# Patient Record
Sex: FEMALE | Race: WHITE | NOT HISPANIC OR LATINO | URBAN - METROPOLITAN AREA
[De-identification: names, ages, dates, MRNs, and addresses within clinical notes are randomized per-mention and may not be internally consistent; named-entity substitution may affect disease eponyms.]

---

## 2022-01-11 VITALS
TEMPERATURE: 98 F | DIASTOLIC BLOOD PRESSURE: 76 MMHG | HEART RATE: 108 BPM | WEIGHT: 119.93 LBS | RESPIRATION RATE: 24 BRPM | OXYGEN SATURATION: 81 % | SYSTOLIC BLOOD PRESSURE: 136 MMHG

## 2022-01-11 LAB
ALBUMIN SERPL ELPH-MCNC: 2.6 G/DL — LOW (ref 3.4–5)
ALP SERPL-CCNC: 90 U/L — SIGNIFICANT CHANGE UP (ref 40–120)
ALT FLD-CCNC: 29 U/L — SIGNIFICANT CHANGE UP (ref 12–42)
ANION GAP SERPL CALC-SCNC: 10 MMOL/L — SIGNIFICANT CHANGE UP (ref 9–16)
APTT BLD: 28.5 SEC — SIGNIFICANT CHANGE UP (ref 27.5–35.5)
AST SERPL-CCNC: 35 U/L — SIGNIFICANT CHANGE UP (ref 15–37)
BASOPHILS # BLD AUTO: 0.05 K/UL — SIGNIFICANT CHANGE UP (ref 0–0.2)
BASOPHILS NFR BLD AUTO: 0.4 % — SIGNIFICANT CHANGE UP (ref 0–2)
BILIRUB SERPL-MCNC: 0.6 MG/DL — SIGNIFICANT CHANGE UP (ref 0.2–1.2)
BUN SERPL-MCNC: 15 MG/DL — SIGNIFICANT CHANGE UP (ref 7–23)
CALCIUM SERPL-MCNC: 9.1 MG/DL — SIGNIFICANT CHANGE UP (ref 8.5–10.5)
CHLORIDE SERPL-SCNC: 96 MMOL/L — SIGNIFICANT CHANGE UP (ref 96–108)
CO2 SERPL-SCNC: 28 MMOL/L — SIGNIFICANT CHANGE UP (ref 22–31)
CREAT SERPL-MCNC: 0.9 MG/DL — SIGNIFICANT CHANGE UP (ref 0.5–1.3)
D DIMER BLD IA.RAPID-MCNC: 598 NG/ML DDU — HIGH
EOSINOPHIL # BLD AUTO: 0.3 K/UL — SIGNIFICANT CHANGE UP (ref 0–0.5)
EOSINOPHIL NFR BLD AUTO: 2.3 % — SIGNIFICANT CHANGE UP (ref 0–6)
GLUCOSE SERPL-MCNC: 199 MG/DL — HIGH (ref 70–99)
HCT VFR BLD CALC: 41.1 % — SIGNIFICANT CHANGE UP (ref 34.5–45)
HGB BLD-MCNC: 13.4 G/DL — SIGNIFICANT CHANGE UP (ref 11.5–15.5)
IMM GRANULOCYTES NFR BLD AUTO: 1.3 % — SIGNIFICANT CHANGE UP (ref 0–1.5)
INR BLD: 1.19 — HIGH (ref 0.88–1.16)
LYMPHOCYTES # BLD AUTO: 0.92 K/UL — LOW (ref 1–3.3)
LYMPHOCYTES # BLD AUTO: 7.2 % — LOW (ref 13–44)
MCHC RBC-ENTMCNC: 29.8 PG — SIGNIFICANT CHANGE UP (ref 27–34)
MCHC RBC-ENTMCNC: 32.6 GM/DL — SIGNIFICANT CHANGE UP (ref 32–36)
MCV RBC AUTO: 91.5 FL — SIGNIFICANT CHANGE UP (ref 80–100)
MONOCYTES # BLD AUTO: 0.86 K/UL — SIGNIFICANT CHANGE UP (ref 0–0.9)
MONOCYTES NFR BLD AUTO: 6.7 % — SIGNIFICANT CHANGE UP (ref 2–14)
NEUTROPHILS # BLD AUTO: 10.48 K/UL — HIGH (ref 1.8–7.4)
NEUTROPHILS NFR BLD AUTO: 82.1 % — HIGH (ref 43–77)
NRBC # BLD: 0 /100 WBCS — SIGNIFICANT CHANGE UP (ref 0–0)
PLATELET # BLD AUTO: 277 K/UL — SIGNIFICANT CHANGE UP (ref 150–400)
POTASSIUM SERPL-MCNC: 3.6 MMOL/L — SIGNIFICANT CHANGE UP (ref 3.5–5.3)
POTASSIUM SERPL-SCNC: 3.6 MMOL/L — SIGNIFICANT CHANGE UP (ref 3.5–5.3)
PROT SERPL-MCNC: 7.5 G/DL — SIGNIFICANT CHANGE UP (ref 6.4–8.2)
PROTHROM AB SERPL-ACNC: 13.9 SEC — HIGH (ref 10.6–13.6)
RBC # BLD: 4.49 M/UL — SIGNIFICANT CHANGE UP (ref 3.8–5.2)
RBC # FLD: 13 % — SIGNIFICANT CHANGE UP (ref 10.3–14.5)
SARS-COV-2 RNA SPEC QL NAA+PROBE: DETECTED
SODIUM SERPL-SCNC: 134 MMOL/L — SIGNIFICANT CHANGE UP (ref 132–145)
WBC # BLD: 12.77 K/UL — HIGH (ref 3.8–10.5)
WBC # FLD AUTO: 12.77 K/UL — HIGH (ref 3.8–10.5)

## 2022-01-11 PROCEDURE — 71045 X-RAY EXAM CHEST 1 VIEW: CPT | Mod: 26

## 2022-01-11 RX ORDER — LEVOTHYROXINE SODIUM 125 MCG
1 TABLET ORAL
Qty: 0 | Refills: 0 | DISCHARGE

## 2022-01-11 NOTE — ED PROVIDER NOTE - PROGRESS NOTE DETAILS
spoke with dr Johnson medicine, [facility] trial NC and repeat CMP. NC pt able to tolerate 4L O2 by NC with SPo2 97%, sustained while awake and asleep. Improved after NS. pending accepting by [facility] medicine. john bcx and ucx, agreed with no need for abx given COVID PNA

## 2022-01-11 NOTE — ED ADULT NURSE NOTE - HISTORY OF COVID-19 VACCINATION
Addended by: TOM ABDUL on: 9/11/2020 08:46 AM     Modules accepted: Orders     Vaccine status unknown

## 2022-01-11 NOTE — ED ADULT NURSE NOTE - NSIMPLEMENTINTERV_GEN_ALL_ED
Implemented All Fall with Harm Risk Interventions:  [place] to call system. Call bell, personal items and telephone within reach. Instruct patient to call for assistance. Room bathroom lighting operational. Non-slip footwear when patient is off stretcher. Physically safe environment: no spills, clutter or unnecessary equipment. Stretcher in lowest position, wheels locked, appropriate side rails in place. Provide visual cue, wrist band, yellow gown, etc. Monitor gait and stability. Monitor for mental status changes and reorient to person, place, and time. Review medications for side effects contributing to fall risk. Reinforce activity limits and safety measures with patient and family. Provide visual clues: red socks.

## 2022-01-11 NOTE — ED ADULT NURSE NOTE - OBJECTIVE STATEMENT
Patient BIBA for hypoxia on exertion per EMS pt satting at 70-80's on RA. Known COVID pos. Coming from [place]. Tachycardic and tachypneic on exam. Pt upgraded due to acuity. RN and MD at bedside. cardiac moniotring in place. Will continue to monitor.

## 2022-01-11 NOTE — ED PROVIDER NOTE - OBJECTIVE STATEMENT
89 yo female with hx DM hypothyroid and HLD, Nepali speaking only, with hypoxia (+) COVID. Hx obtained from her daughter AMALIA [phone] who was caring for her at home. She along with whole family tested positive 1/2/22 and she has had dec PO intake and progressively low oxygen over the last 2-3 days, they were trying to arrange home oxygen by her PMD DR Sarabia at [facility] but they were unable to provide home services. On arrival to ED hypoxic but otherwise ok appearing, no resp distress.

## 2022-01-11 NOTE — ED PROVIDER NOTE - CLINICAL SUMMARY MEDICAL DECISION MAKING FREE TEXT BOX
COVID 19 viral PNA - supportive care on NC O2, continue   dehydration, improved with IVF in ED.  admit to medicine [facility] Dr Johnson.

## 2022-01-11 NOTE — ED PROVIDER NOTE - PHYSICAL EXAMINATION
VSS in NAD non toxic appearing   NCAT EOMI PERRL OP clear mild dehydrated appearing   heart RRR no murmur   lungs CTA no wheezing no rales no rhonchi   abd soft NT ND no CVAT no guarding no rebound   normal neuro exam CN I-XII grossly intact, no groos motor or sensory deficits  no peripheral c/c/e

## 2022-01-11 NOTE — ED ADULT NURSE REASSESSMENT NOTE - NS ED NURSE REASSESS COMMENT FT1
Pt. received awake and alert semi fowlers in stretcher tachypneic at 26bpm. Pt. on NRB at 15lpm SPo2 99%. 22g to R. hand no redness, swelling, or tenderness noted. Labs redrawn and sent. Pt. on CCM. Pending lab results. Monitoring ongoing.

## 2022-01-12 ENCOUNTER — INPATIENT (INPATIENT)
Facility: HOSPITAL | Age: 87
LOS: 8 days | Discharge: NURSING, REHAB OR LONG-TERM CARE | DRG: 871 | End: 2022-01-21
Attending: STUDENT IN AN ORGANIZED HEALTH CARE EDUCATION/TRAINING PROGRAM | Admitting: HOSPITALIST
Payer: MEDICARE

## 2022-01-12 ENCOUNTER — TRANSCRIPTION ENCOUNTER (OUTPATIENT)
Age: 87
End: 2022-01-12

## 2022-01-12 DIAGNOSIS — U07.1 COVID-19: ICD-10-CM

## 2022-01-12 DIAGNOSIS — E03.9 HYPOTHYROIDISM, UNSPECIFIED: ICD-10-CM

## 2022-01-12 DIAGNOSIS — E78.5 HYPERLIPIDEMIA, UNSPECIFIED: ICD-10-CM

## 2022-01-12 DIAGNOSIS — E11.9 TYPE 2 DIABETES MELLITUS WITHOUT COMPLICATIONS: ICD-10-CM

## 2022-01-12 DIAGNOSIS — A41.9 SEPSIS, UNSPECIFIED ORGANISM: ICD-10-CM

## 2022-01-12 DIAGNOSIS — R63.8 OTHER SYMPTOMS AND SIGNS CONCERNING FOOD AND FLUID INTAKE: ICD-10-CM

## 2022-01-12 DIAGNOSIS — R94.31 ABNORMAL ELECTROCARDIOGRAM [ECG] [EKG]: ICD-10-CM

## 2022-01-12 LAB
A1C WITH ESTIMATED AVERAGE GLUCOSE RESULT: 8.2 % — HIGH (ref 4–5.6)
ALBUMIN SERPL ELPH-MCNC: 3.6 G/DL — SIGNIFICANT CHANGE UP (ref 3.3–5)
ALP SERPL-CCNC: 102 U/L — SIGNIFICANT CHANGE UP (ref 40–120)
ALT FLD-CCNC: 20 U/L — SIGNIFICANT CHANGE UP (ref 10–45)
ANION GAP SERPL CALC-SCNC: 17 MMOL/L — SIGNIFICANT CHANGE UP (ref 5–17)
APPEARANCE UR: CLEAR — SIGNIFICANT CHANGE UP
AST SERPL-CCNC: 26 U/L — SIGNIFICANT CHANGE UP (ref 10–40)
BACTERIA # UR AUTO: PRESENT /HPF
BASOPHILS # BLD AUTO: 0.04 K/UL — SIGNIFICANT CHANGE UP (ref 0–0.2)
BASOPHILS NFR BLD AUTO: 0.4 % — SIGNIFICANT CHANGE UP (ref 0–2)
BILIRUB SERPL-MCNC: 0.6 MG/DL — SIGNIFICANT CHANGE UP (ref 0.2–1.2)
BILIRUB UR-MCNC: NEGATIVE — SIGNIFICANT CHANGE UP
BUN SERPL-MCNC: 14 MG/DL — SIGNIFICANT CHANGE UP (ref 7–23)
CALCIUM SERPL-MCNC: 9.1 MG/DL — SIGNIFICANT CHANGE UP (ref 8.4–10.5)
CHLORIDE SERPL-SCNC: 96 MMOL/L — SIGNIFICANT CHANGE UP (ref 96–108)
CO2 SERPL-SCNC: 26 MMOL/L — SIGNIFICANT CHANGE UP (ref 22–31)
COLOR SPEC: YELLOW — SIGNIFICANT CHANGE UP
CREAT SERPL-MCNC: 0.76 MG/DL — SIGNIFICANT CHANGE UP (ref 0.5–1.3)
CRP SERPL-MCNC: 231.8 MG/L — HIGH (ref 0–4)
DIFF PNL FLD: NEGATIVE — SIGNIFICANT CHANGE UP
EOSINOPHIL # BLD AUTO: 0 K/UL — SIGNIFICANT CHANGE UP (ref 0–0.5)
EOSINOPHIL NFR BLD AUTO: 0 % — SIGNIFICANT CHANGE UP (ref 0–6)
EPI CELLS # UR: SIGNIFICANT CHANGE UP /HPF (ref 0–5)
ESTIMATED AVERAGE GLUCOSE: 189 MG/DL — HIGH (ref 68–114)
FERRITIN SERPL-MCNC: 657 NG/ML — HIGH (ref 15–150)
FERRITIN SERPL-MCNC: 693 NG/ML — HIGH (ref 15–150)
FOLATE SERPL-MCNC: 9.6 NG/ML — SIGNIFICANT CHANGE UP
GLUCOSE BLDC GLUCOMTR-MCNC: 241 MG/DL — HIGH (ref 70–99)
GLUCOSE BLDC GLUCOMTR-MCNC: 265 MG/DL — HIGH (ref 70–99)
GLUCOSE BLDC GLUCOMTR-MCNC: 265 MG/DL — HIGH (ref 70–99)
GLUCOSE BLDC GLUCOMTR-MCNC: 296 MG/DL — HIGH (ref 70–99)
GLUCOSE SERPL-MCNC: 264 MG/DL — HIGH (ref 70–99)
GLUCOSE UR QL: NEGATIVE — SIGNIFICANT CHANGE UP
HCT VFR BLD CALC: 38.2 % — SIGNIFICANT CHANGE UP (ref 34.5–45)
HGB BLD-MCNC: 11.9 G/DL — SIGNIFICANT CHANGE UP (ref 11.5–15.5)
HYALINE CASTS # UR AUTO: ABNORMAL /LPF (ref 0–2)
IMM GRANULOCYTES NFR BLD AUTO: 2 % — HIGH (ref 0–1.5)
KETONES UR-MCNC: 40 MG/DL
LEUKOCYTE ESTERASE UR-ACNC: NEGATIVE — SIGNIFICANT CHANGE UP
LYMPHOCYTES # BLD AUTO: 0.59 K/UL — LOW (ref 1–3.3)
LYMPHOCYTES # BLD AUTO: 5.9 % — LOW (ref 13–44)
MAGNESIUM SERPL-MCNC: 2.2 MG/DL — SIGNIFICANT CHANGE UP (ref 1.6–2.6)
MCHC RBC-ENTMCNC: 28.8 PG — SIGNIFICANT CHANGE UP (ref 27–34)
MCHC RBC-ENTMCNC: 31.2 GM/DL — LOW (ref 32–36)
MCV RBC AUTO: 92.5 FL — SIGNIFICANT CHANGE UP (ref 80–100)
MONOCYTES # BLD AUTO: 0.19 K/UL — SIGNIFICANT CHANGE UP (ref 0–0.9)
MONOCYTES NFR BLD AUTO: 1.9 % — LOW (ref 2–14)
NEUTROPHILS # BLD AUTO: 9.04 K/UL — HIGH (ref 1.8–7.4)
NEUTROPHILS NFR BLD AUTO: 89.8 % — HIGH (ref 43–77)
NITRITE UR-MCNC: POSITIVE
NRBC # BLD: 0 /100 WBCS — SIGNIFICANT CHANGE UP (ref 0–0)
PH UR: 6 — SIGNIFICANT CHANGE UP (ref 5–8)
PHOSPHATE SERPL-MCNC: 3.7 MG/DL — SIGNIFICANT CHANGE UP (ref 2.5–4.5)
PLATELET # BLD AUTO: 319 K/UL — SIGNIFICANT CHANGE UP (ref 150–400)
POTASSIUM SERPL-MCNC: 3.7 MMOL/L — SIGNIFICANT CHANGE UP (ref 3.5–5.3)
POTASSIUM SERPL-SCNC: 3.7 MMOL/L — SIGNIFICANT CHANGE UP (ref 3.5–5.3)
PROCALCITONIN SERPL-MCNC: 0.1 NG/ML — SIGNIFICANT CHANGE UP (ref 0.02–0.1)
PROT SERPL-MCNC: 7.1 G/DL — SIGNIFICANT CHANGE UP (ref 6–8.3)
PROT UR-MCNC: 30 MG/DL
RBC # BLD: 4.13 M/UL — SIGNIFICANT CHANGE UP (ref 3.8–5.2)
RBC # FLD: 12.7 % — SIGNIFICANT CHANGE UP (ref 10.3–14.5)
RBC CASTS # UR COMP ASSIST: < 5 /HPF — SIGNIFICANT CHANGE UP
SODIUM SERPL-SCNC: 139 MMOL/L — SIGNIFICANT CHANGE UP (ref 135–145)
SP GR SPEC: 1.02 — SIGNIFICANT CHANGE UP (ref 1–1.03)
TRANSFERRIN SERPL-MCNC: 125 MG/DL — LOW (ref 200–360)
TSH SERPL-MCNC: 1.1 UIU/ML — SIGNIFICANT CHANGE UP (ref 0.27–4.2)
UROBILINOGEN FLD QL: 1 E.U./DL — SIGNIFICANT CHANGE UP
VIT B12 SERPL-MCNC: 825 PG/ML — SIGNIFICANT CHANGE UP (ref 232–1245)
WBC # BLD: 10.06 K/UL — SIGNIFICANT CHANGE UP (ref 3.8–10.5)
WBC # FLD AUTO: 10.06 K/UL — SIGNIFICANT CHANGE UP (ref 3.8–10.5)
WBC UR QL: < 5 /HPF — SIGNIFICANT CHANGE UP

## 2022-01-12 PROCEDURE — 93010 ELECTROCARDIOGRAM REPORT: CPT

## 2022-01-12 PROCEDURE — 99232 SBSQ HOSP IP/OBS MODERATE 35: CPT

## 2022-01-12 PROCEDURE — 99285 EMERGENCY DEPT VISIT HI MDM: CPT | Mod: CS,25

## 2022-01-12 PROCEDURE — 71045 X-RAY EXAM CHEST 1 VIEW: CPT | Mod: 26

## 2022-01-12 RX ORDER — ACETAMINOPHEN 500 MG
650 TABLET ORAL EVERY 6 HOURS
Refills: 0 | Status: DISCONTINUED | OUTPATIENT
Start: 2022-01-12 | End: 2022-01-21

## 2022-01-12 RX ORDER — SODIUM CHLORIDE 9 MG/ML
1000 INJECTION, SOLUTION INTRAVENOUS
Refills: 0 | Status: DISCONTINUED | OUTPATIENT
Start: 2022-01-12 | End: 2022-01-21

## 2022-01-12 RX ORDER — PANTOPRAZOLE SODIUM 20 MG/1
40 TABLET, DELAYED RELEASE ORAL
Refills: 0 | Status: COMPLETED | OUTPATIENT
Start: 2022-01-12 | End: 2022-01-21

## 2022-01-12 RX ORDER — DEXTROSE 50 % IN WATER 50 %
25 SYRINGE (ML) INTRAVENOUS ONCE
Refills: 0 | Status: DISCONTINUED | OUTPATIENT
Start: 2022-01-12 | End: 2022-01-21

## 2022-01-12 RX ORDER — INSULIN LISPRO 100/ML
VIAL (ML) SUBCUTANEOUS
Refills: 0 | Status: DISCONTINUED | OUTPATIENT
Start: 2022-01-12 | End: 2022-01-17

## 2022-01-12 RX ORDER — REMDESIVIR 5 MG/ML
100 INJECTION INTRAVENOUS EVERY 24 HOURS
Refills: 0 | Status: COMPLETED | OUTPATIENT
Start: 2022-01-13 | End: 2022-01-16

## 2022-01-12 RX ORDER — LANOLIN ALCOHOL/MO/W.PET/CERES
3 CREAM (GRAM) TOPICAL AT BEDTIME
Refills: 0 | Status: DISCONTINUED | OUTPATIENT
Start: 2022-01-12 | End: 2022-01-21

## 2022-01-12 RX ORDER — CYCLOSPORINE 0.5 MG/ML
1 EMULSION OPHTHALMIC
Qty: 0 | Refills: 0 | DISCHARGE

## 2022-01-12 RX ORDER — DEXAMETHASONE 0.5 MG/5ML
6 ELIXIR ORAL EVERY 24 HOURS
Refills: 0 | Status: COMPLETED | OUTPATIENT
Start: 2022-01-13 | End: 2022-01-21

## 2022-01-12 RX ORDER — REMDESIVIR 5 MG/ML
200 INJECTION INTRAVENOUS EVERY 24 HOURS
Refills: 0 | Status: COMPLETED | OUTPATIENT
Start: 2022-01-12 | End: 2022-01-12

## 2022-01-12 RX ORDER — REPAGLINIDE 1 MG/1
1 TABLET ORAL
Qty: 0 | Refills: 0 | DISCHARGE

## 2022-01-12 RX ORDER — GLUCAGON INJECTION, SOLUTION 0.5 MG/.1ML
1 INJECTION, SOLUTION SUBCUTANEOUS ONCE
Refills: 0 | Status: DISCONTINUED | OUTPATIENT
Start: 2022-01-12 | End: 2022-01-21

## 2022-01-12 RX ORDER — LEVOTHYROXINE SODIUM 125 MCG
75 TABLET ORAL DAILY
Refills: 0 | Status: DISCONTINUED | OUTPATIENT
Start: 2022-01-12 | End: 2022-01-21

## 2022-01-12 RX ORDER — POTASSIUM CHLORIDE 20 MEQ
20 PACKET (EA) ORAL ONCE
Refills: 0 | Status: COMPLETED | OUTPATIENT
Start: 2022-01-12 | End: 2022-01-20

## 2022-01-12 RX ORDER — REMDESIVIR 5 MG/ML
INJECTION INTRAVENOUS
Refills: 0 | Status: COMPLETED | OUTPATIENT
Start: 2022-01-12 | End: 2022-01-16

## 2022-01-12 RX ORDER — ENOXAPARIN SODIUM 100 MG/ML
40 INJECTION SUBCUTANEOUS EVERY 24 HOURS
Refills: 0 | Status: DISCONTINUED | OUTPATIENT
Start: 2022-01-12 | End: 2022-01-21

## 2022-01-12 RX ORDER — DEXTROSE 50 % IN WATER 50 %
15 SYRINGE (ML) INTRAVENOUS ONCE
Refills: 0 | Status: DISCONTINUED | OUTPATIENT
Start: 2022-01-12 | End: 2022-01-21

## 2022-01-12 RX ORDER — DEXTROSE 50 % IN WATER 50 %
12.5 SYRINGE (ML) INTRAVENOUS ONCE
Refills: 0 | Status: DISCONTINUED | OUTPATIENT
Start: 2022-01-12 | End: 2022-01-21

## 2022-01-12 RX ORDER — DEXAMETHASONE 0.5 MG/5ML
6 ELIXIR ORAL ONCE
Refills: 0 | Status: COMPLETED | OUTPATIENT
Start: 2022-01-12 | End: 2022-01-12

## 2022-01-12 RX ADMIN — ENOXAPARIN SODIUM 40 MILLIGRAM(S): 100 INJECTION SUBCUTANEOUS at 06:24

## 2022-01-12 RX ADMIN — Medication 6 MILLIGRAM(S): at 01:08

## 2022-01-12 RX ADMIN — Medication 6: at 12:16

## 2022-01-12 RX ADMIN — Medication 4: at 22:21

## 2022-01-12 RX ADMIN — Medication 1 DROP(S): at 21:42

## 2022-01-12 RX ADMIN — Medication 75 MICROGRAM(S): at 06:24

## 2022-01-12 RX ADMIN — Medication 3 MILLIGRAM(S): at 21:42

## 2022-01-12 RX ADMIN — Medication 1 DROP(S): at 12:15

## 2022-01-12 RX ADMIN — Medication 1 TABLET(S): at 12:15

## 2022-01-12 RX ADMIN — Medication 6: at 08:45

## 2022-01-12 RX ADMIN — Medication 6: at 17:35

## 2022-01-12 RX ADMIN — REMDESIVIR 200 MILLIGRAM(S): 5 INJECTION INTRAVENOUS at 06:24

## 2022-01-12 RX ADMIN — PANTOPRAZOLE SODIUM 40 MILLIGRAM(S): 20 TABLET, DELAYED RELEASE ORAL at 06:24

## 2022-01-12 NOTE — DISCHARGE NOTE PROVIDER - HOSPITAL COURSE
#Discharge: do not delete    Patient is 89 yo F with PMH DM, hypothyroidism, HLD, and s/p Pfizer x2 who p/w ERICKSON, decreased PO intake, and worsening hypoxia x3d (to low 80s%), admitted for further management of COVID+ 1/2/22.    Hospital course (by problem):   #2019 novel coronavirus disease (COVID-19).   ·  Plan: - as above  - d-dimer 598; ferritin and CRP pending  - f/u procal -- defer abx at present  - continue to trend inflammatory markers  - start remdesivir x5d (end 1/16)  - c/w decadron 6mg x10d (end 1/20)  - wean O2 as able.    #Acute sepsis. - RESOLVED  ·  Plan: - pt p/w 3d dec PO intake, ERICKSON, and hypoxia in the setting of known COVID+ 1/2/22, was trying to obtain home O2 via PCP but unsuccessful; initial O2sat low 90s--80s--70s on day of presentation  - on arrival, meeting 3/4 SIRS criteria (tachycardia, tachypnea, and leukocytosis) with likely etiology pulmonary in setting of COVID-19  - CXR with BL infiltrates L>R  - f/u UA  - f/u procal  - defer abx at present.    # Prolonged QT interval.   ·  Plan: - EKG on arrival ST without ischemia, QTc 535  - f/u AM EKG  - avoid QTc prolonging agents.    #Diabetes.   ·  Plan: - pt with hx T2D, on repaglinide 2mg outpt  - check A1C   - carb consistent diet  - mISS while inpt.    #Hypothyroidism.   ·  Plan: - pt with hx hypothyroidism, on synthroid 75 mcg outpt  - c/w home med  - check TSH.    #Hyperlipidemia.   ·  Plan: - pt with hx HLD, not on meds.    Patient was discharged to: HOME (home/[facility]/acute rehab/hospice, etc, and with what services – home health PT/RN? Home O2?)    New medications:   Changes to old medications: NONE  Medications that were stopped: NONE    Items to follow up as outpatient: PRIMARY CARE DOCTOR    Physical exam at the time of discharge:  Constitutional: NAD, comfortable in bed.  HEENT: NC/AT, PERRLA, EOMI, no conjunctival pallor or scleral icterus, mucus membranes moist.  Neck: Supple, no JVD. My thyromegaly or masses.  Respiratory: Clear to auscultation B/L. No wheezing, rales, rhonchi.  Cardiovascular: RRR, normal S1 and S2, no murmurs, rubs, gallops.  Gastrointestinal: +BS, soft NTND, no guarding or rebound tenderness, no palpable masses.  Extremities: Warm well perfused. No cyanosis, clubbing or edema.   Vascular: +2 pulses equal and strong throughout.   Neurological: AAOx3, no CN deficits, strength and sensation intact throughout.   Skin: No gross skin abnormalities or rashes. #Discharge: do not delete    Patient is 89 yo F with PMH DM, hypothyroidism, HLD, and s/p Pfizer x2 who p/w ERICKSON, decreased PO intake, and worsening hypoxia x3d (to low 80s%), admitted for further management of COVID+ 1/2/22.    Hospital course (by problem):   #2019 novel coronavirus disease (COVID-19)  - Completed remdesivir x5d (end 1/16)  - decadron 6mg x10d (end 1/20)  - wean O2 as able.    #Acute sepsis. - RESOLVED  - pt p/w 3d dec PO intake, ERICKSON, and hypoxia in the setting of known COVID+ 1/2/22, was trying to obtain home O2 via PCP but unsuccessful; initial O2sat low 90s--80s--70s on day of presentation  - on arrival, meeting 3/4 SIRS criteria (tachycardia, tachypnea, and leukocytosis) with likely etiology pulmonary in setting of COVID-19    #Diabetes.   - pt with hx T2D, on repaglinide 2mg outpt    #Hypothyroidism  - pt with hx hypothyroidism, on synthroid 75 mcg outpt    #Hyperlipidemia.   - not on meds.    Patient was discharged to: [facility]    New medications: NONE  Changes to old medications: NONE  Medications that were stopped: NONE    Items to follow up as outpatient: PRIMARY CARE DOCTOR    Physical exam at the time of discharge:  Constitutional: NAD, comfortable in bed.  HEENT: NC/AT, PERRLA, EOMI, no conjunctival pallor or scleral icterus, mucus membranes moist.  Neck: Supple, no JVD. My thyromegaly or masses.  Respiratory: Clear to auscultation B/L. No wheezing, rales, rhonchi.  Cardiovascular: RRR, normal S1 and S2, no murmurs, rubs, gallops.  Gastrointestinal: +BS, soft NTND, no guarding or rebound tenderness, no palpable masses.  Extremities: Warm well perfused. No cyanosis, clubbing or edema.   Vascular: +2 pulses equal and strong throughout.   Neurological: AAOx3, no CN deficits, strength and sensation intact throughout.   Skin: No gross skin abnormalities or rashes. #Discharge: do not delete    Patient is 89 yo F with PMH DM, hypothyroidism, HLD, and s/p Pfizer x2 who p/w ERICKSON, decreased PO intake, and worsening hypoxia x3d (to low 80s%), admitted for further management of COVID+ 1/2/22.    Hospital course (by problem):   #2019 novel coronavirus disease (COVID-19)  - Completed remdesivir x5d (end 1/16)  - completed decadron 6mg x10d (end 1/20)  - weaned O2 to 4L NC. Pt will be discharged on 4L NC to rehab as patient is still requiring O2 but is starting to improve    #Acute sepsis. - RESOLVED  - pt p/w 3d dec PO intake, ERICKSON, and hypoxia in the setting of known COVID+ 1/2/22, was trying to obtain home O2 via PCP but unsuccessful; initial O2sat low 90s--80s--70s on day of presentation  - on arrival, meeting 3/4 SIRS criteria (tachycardia, tachypnea, and leukocytosis) with likely etiology pulmonary in setting of COVID-19    #Diabetes.   - pt with hx T2D, on repaglinide 2mg outpt    #Hypothyroidism  - pt with hx hypothyroidism, on synthroid 75 mcg outpt    #Hyperlipidemia.   - not on meds.    Patient was discharged to: [facility]    New medications: NONE  Changes to old medications: NONE  Medications that were stopped: NONE    Items to follow up as outpatient: PRIMARY CARE DOCTOR regarding a1c and diabetes medications    Physical exam at the time of discharge:  Constitutional: NAD, comfortable in bed.  HEENT: NC/AT, PERRLA, EOMI, no conjunctival pallor or scleral icterus, mucus membranes moist.  Neck: Supple, no JVD. My thyromegaly or masses.  Respiratory: Clear to auscultation B/L. No wheezing, rales, rhonchi. on 4L  Cardiovascular: RRR, normal S1 and S2, no murmurs, rubs, gallops.  Gastrointestinal: +BS, soft NTND, no guarding or rebound tenderness, no palpable masses.  Extremities: Warm well perfused. No cyanosis, clubbing or edema.   Vascular: +2 pulses equal and strong throughout.   Neurological: AAOx3, no CN deficits, strength and sensation intact throughout.   Skin: No gross skin abnormalities or rashes. #Discharge: do not delete    Patient is 89 yo F with PMH DM, hypothyroidism, HLD, and s/p Pfizer x2 who p/w ERICKSON, decreased PO intake, and worsening hypoxia x3d (to low 80s%), admitted for further management of COVID+ 1/2/22.    Hospital course (by problem):   #2019 novel coronavirus disease (COVID-19)  - Completed remdesivir x5d (end 1/16)  - completed decadron 6mg x10d (end 1/20)  - weaned O2 to 4L NC. Pt will be discharged on 4L NC to rehab as patient is still requiring O2 but is starting to improve    #Acute sepsis. - RESOLVED  - pt p/w 3d dec PO intake, ERICKSON, and hypoxia in the setting of known COVID+ 1/2/22, was trying to obtain home O2 via PCP but unsuccessful; initial O2sat low 90s--80s--70s on day of presentation  - on arrival, meeting 3/4 SIRS criteria (tachycardia, tachypnea, and leukocytosis) with likely etiology pulmonary in setting of COVID-19    #Diabetes.   - pt with hx T2D, on repaglinide 2mg outpt  - was on mISS and basal bolus 13U lantus and 6U premeal lispro while on steroids  - sugars should improve now that she is off steroids  -a1c 8.1. should evaluate for further medications    #Hypothyroidism  - pt with hx hypothyroidism, on synthroid 75 mcg outpt    #Hyperlipidemia.   - not on meds.    Patient was discharged to: [facility]    New medications: NONE  Changes to old medications: NONE  Medications that were stopped: NONE    Items to follow up as outpatient: PRIMARY CARE DOCTOR regarding a1c and diabetes medications    Physical exam at the time of discharge:  Constitutional: NAD, comfortable in bed.  HEENT: NC/AT, PERRLA, EOMI, no conjunctival pallor or scleral icterus, mucus membranes moist.  Neck: Supple, no JVD. My thyromegaly or masses.  Respiratory: Clear to auscultation B/L. No wheezing, rales, rhonchi. on 4L  Cardiovascular: RRR, normal S1 and S2, no murmurs, rubs, gallops.  Gastrointestinal: +BS, soft NTND, no guarding or rebound tenderness, no palpable masses.  Extremities: Warm well perfused. No cyanosis, clubbing or edema.   Vascular: +2 pulses equal and strong throughout.   Neurological: AAOx3, no CN deficits, strength and sensation intact throughout.   Skin: No gross skin abnormalities or rashes.

## 2022-01-12 NOTE — H&P ADULT - PROBLEM SELECTOR PLAN 1
- pt p/w 3d dec PO intake, ERICKSON, and hypoxia in the setting of known COVID+ 1/2/22, was trying to obtain home O2 via PCP but unsuccessful; initial O2sat low 90s--80s--70s on day of presentation  - on arrival, meeting 3/4 SIRS criteria (tachycardia, tachypnea, and leukocytosis) with likely etiology pulmonary in setting of COVID-19  - CXR with BL infiltrates L>R  - f/u UA  - f/u procal  - defer abx at present

## 2022-01-12 NOTE — DISCHARGE NOTE PROVIDER - NSDCCPCAREPLAN_GEN_ALL_CORE_FT
PRINCIPAL DISCHARGE DIAGNOSIS  Diagnosis: COVID-19  Assessment and Plan of Treatment: You were admitted to the hospital and found to have a COVID infection. You were treated with steroids called DECADRON and a medication called REMDESIVIR while you were inpatient. If you experience worsening symptoms including shortness of breath, chest pain, dizziness, fevers, or chills, please consult your primary care doctor or return to the emergency department immediately.         PRINCIPAL DISCHARGE DIAGNOSIS  Diagnosis: COVID-19  Assessment and Plan of Treatment: You were admitted to the hospital and found to have a COVID infection. You were treated with steroids called DECADRON and a medication called REMDESIVIR while you were inpatient. Your oxygen requirements have improved since you were treated for covid. You should continue on 4L of oxygen at rehab until your lungs improve further. If you experience worsening symptoms including shortness of breath, chest pain, dizziness, fevers, or chills, please consult your primary care doctor or return to the emergency department immediately.

## 2022-01-12 NOTE — DISCHARGE NOTE PROVIDER - NSDCMRMEDTOKEN_GEN_ALL_CORE_FT
levothyroxine 75 mcg (0.075 mg) oral tablet: 1 tab(s) orally once a day  repaglinide 2 mg oral tablet: 1 tab(s) orally 3 times a day (before meals)  Restasis 0.05% ophthalmic emulsion: 1 drop(s) to each affected eye every 12 hours

## 2022-01-12 NOTE — H&P ADULT - NSHPLABSRESULTS_GEN_ALL_CORE
LABS:                        13.4   12.77 )-----------( 277      ( 11 Jan 2022 20:26 )             41.1     01-11    134  |  96  |  15  ----------------------------<  199<H>  3.6   |  28  |  0.90    Ca    9.1      11 Jan 2022 22:10    TPro  7.5  /  Alb  2.6<L>  /  TBili  0.6  /  DBili  x   /  AST  35  /  ALT  29  /  AlkPhos  90  01-11    PT/INR - ( 11 Jan 2022 20:26 )   PT: 13.9 sec;   INR: 1.19          PTT - ( 11 Jan 2022 20:26 )  PTT:28.5 sec    CAPILLARY BLOOD GLUCOSE      POCT Blood Glucose.: 171 mg/dL (11 Jan 2022 19:52)      RADIOLOGY & ADDITIONAL TESTS: Reviewed.

## 2022-01-12 NOTE — H&P ADULT - PROBLEM SELECTOR PLAN 7
- F: none  - E: replete K<4, Mg<2  - N: carb consistent  - D: lovenox 40mg q24h  - G: protonix 40mg/d while on steroids    code: full  dispo: COVID RMF

## 2022-01-12 NOTE — H&P ADULT - ASSESSMENT
Pt is a 89 yo F with PMH DM, hypothyroidism, HLD, and s/p Pfizer x2 who p/w ERICKSON, decreased PO intake, and hypoxia x3d. Found to be COVID+ with BL infiltrates on CXR. Admitted to [facility] for further observation and management.

## 2022-01-12 NOTE — H&P ADULT - PROBLEM SELECTOR PLAN 2
- as above  - d-dimer 598; ferritin and CRP pending  - f/u procal -- defer abx at present  - - as above  - d-dimer 598; ferritin and CRP pending  - f/u procal -- defer abx at present  - continue to trend inflammatory markers  - start remdesivir x5d (end 1/16)  - c/w decadron 6mg x10d (end 1/20)  - wean O2 as able

## 2022-01-12 NOTE — H&P ADULT - NSHPPHYSICALEXAM_GEN_ALL_CORE
VITAL SIGNS:  T(C): 36.8 (01-12-22 @ 01:27), Max: 37.3 (01-11-22 @ 21:22)  T(F): 98.2 (01-12-22 @ 01:27), Max: 99.2 (01-11-22 @ 21:22)  HR: 90 (01-12-22 @ 01:27) (89 - 109)  BP: 148/74 (01-12-22 @ 01:27) (136/63 - 148/74)  BP(mean): --  RR: 18 (01-12-22 @ 01:27) (18 - 24)  SpO2: 98% (01-12-22 @ 01:27) (81% - 99%)  Wt(kg): --    PHYSICAL EXAM:  Constitutional: WDWN resting comfortably in bed; NAD  Head: NC/AT  Eyes: PERRL, EOMI, anicteric sclera  ENT: no nasal discharge; MMM  Neck: supple; no JVD  Respiratory: CTA B/L; no W/R/R  Cardiac: +S1/S2; RRR; no M/R/G  Gastrointestinal: soft, NT/ND; no rebound or guarding; +BSx4  Extremities: WWP, no clubbing or cyanosis; no peripheral edema  Musculoskeletal: NROM x4; no joint swelling, tenderness or erythema  Vascular: 2+ radial, DP/PT pulses B/L  Dermatologic: skin warm, dry and intact; no rashes, wounds, or scars  Neurologic: AAOx3; CNII-XII grossly intact; no focal deficits  Psychiatric: affect and characteristics of appearance, verbalizations, behaviors are appropriate VITAL SIGNS:  T(C): 36.8 (01-12-22 @ 01:27), Max: 37.3 (01-11-22 @ 21:22)  T(F): 98.2 (01-12-22 @ 01:27), Max: 99.2 (01-11-22 @ 21:22)  HR: 90 (01-12-22 @ 01:27) (89 - 109)  BP: 148/74 (01-12-22 @ 01:27) (136/63 - 148/74)  BP(mean): --  RR: 18 (01-12-22 @ 01:27) (18 - 24)  SpO2: 98% (01-12-22 @ 01:27) (81% - 99%)  Wt(kg): --    PHYSICAL EXAM:  Constitutional: WDWN resting comfortably in bed; NAD; very hard of hearing  Head: NC/AT  Eyes: PERRL, EOMI, anicteric sclera  ENT: no nasal discharge; MMM  Neck: supple; no JVD  Respiratory: CTA B/L; no W/R/R; on 2L NC without signs of respiratory distress, speaking in full sentences  Cardiac: +S1/S2; RRR; no M/R/G  Gastrointestinal: soft, NT/ND; no rebound or guarding; +BSx4  Extremities: WWP, no clubbing or cyanosis; no peripheral edema  Musculoskeletal: NROM x4; no joint swelling, tenderness or erythema  Vascular: 2+ radial, DP/PT pulses B/L  Dermatologic: skin warm, dry and intact; no rashes, wounds, or scars  Neurologic: AAOx3; CNII-XII grossly intact; no focal deficits  Psychiatric: affect and characteristics of appearance, verbalizations, behaviors are appropriate

## 2022-01-12 NOTE — H&P ADULT - HISTORY OF PRESENT ILLNESS
Pt is a 89 yo F with PMH Pt is a 89 yo F with PMH DM, hypothyroidism, HLD, and s/p Pfizer x2 who p/w ERICKSON, decreased PO intake, and hypoxia x3d. Tested positive for COVID 1/2/22 and initial pulse ox readings were in the mid 90s. Over the course of the last 2d pt went to mid 80s and then 70s on day of presentation. Had been working with PCP Dr. Sarabia to get home O2 but they were unable to obtain. Of note, pt returned from a trip to [place] 12/19/21. Lives with daughter's family who initially tested negative when she was positive, and then later they too tested positive. Pt with mild dry cough. Denies HA, changes to vision, CP, abd pain, changes to BMs or urination, pain in extremities. At baseline is fully independent. No other concerns or complaints.     On arrival, T 98.1, , /76, RR 24 O2sat 81% RA -- 99% NRB -- 97% 4L NC. EKG with ST no ischemic changes, QTc 535. Labs with WBC 12.77, 82.1% neutrophils, K 3.6, glu 199, d-dimer 598, albumin 2.6, ferritin and procal pending. COVID+. CXR with BL infiltrates L>R. Pt given decadron 5mg. Admitted to [facility] for further observation and management. Pt is a 89 yo F with PMH DM, hypothyroidism, HLD, and s/p Pfizer x2 who p/w ERICKSON, decreased PO intake, and hypoxia x3d. Tested positive for COVID 1/2/22 and initial pulse ox readings were in the mid 90s. Over the course of the last 2d pt went to mid 80s and then 70s on day of presentation. Had been working with PCP Dr. Sarabia to get home O2 but they were unable to obtain. Of note, pt returned from a trip to [place] 12/19/21. Lives with daughter's family who initially tested negative when she was positive, and then later they too tested positive. Pt with mild dry cough. Denies HA, changes to vision, CP, abd pain, changes to BMs or urination, pain in extremities. At baseline is fully independent. No other concerns or complaints.     On arrival, T 98.1, , /76, RR 24 O2sat 81% RA -- 99% NRB -- 97% 4L NC. EKG with ST no ischemic changes, QTc 535. Labs with WBC 12.77, 82.1% neutrophils, K 3.6, glu 199, d-dimer 598, albumin 2.6, ferritin and procal pending. COVID+. CXR with BL infiltrates L>R. Pt given decadron 6mg. Admitted to [facility] for further observation and management.

## 2022-01-12 NOTE — DISCHARGE NOTE PROVIDER - CARE PROVIDER_API CALL
YOSSI BAKER  Internal Medicine  [address] [place] [address], [address] [address]  Phone: [phone]  Fax: ()-  Established Patient  Follow Up Time: 2 weeks

## 2022-01-13 LAB
ALBUMIN SERPL ELPH-MCNC: 3.1 G/DL — LOW (ref 3.3–5)
ALP SERPL-CCNC: 144 U/L — HIGH (ref 40–120)
ALT FLD-CCNC: 24 U/L — SIGNIFICANT CHANGE UP (ref 10–45)
ANION GAP SERPL CALC-SCNC: 14 MMOL/L — SIGNIFICANT CHANGE UP (ref 5–17)
AST SERPL-CCNC: 29 U/L — SIGNIFICANT CHANGE UP (ref 10–40)
BASOPHILS # BLD AUTO: 0 K/UL — SIGNIFICANT CHANGE UP (ref 0–0.2)
BASOPHILS NFR BLD AUTO: 0 % — SIGNIFICANT CHANGE UP (ref 0–2)
BILIRUB SERPL-MCNC: 0.4 MG/DL — SIGNIFICANT CHANGE UP (ref 0.2–1.2)
BUN SERPL-MCNC: 18 MG/DL — SIGNIFICANT CHANGE UP (ref 7–23)
BURR CELLS BLD QL SMEAR: PRESENT — SIGNIFICANT CHANGE UP
CALCIUM SERPL-MCNC: 9.5 MG/DL — SIGNIFICANT CHANGE UP (ref 8.4–10.5)
CHLORIDE SERPL-SCNC: 98 MMOL/L — SIGNIFICANT CHANGE UP (ref 96–108)
CO2 SERPL-SCNC: 26 MMOL/L — SIGNIFICANT CHANGE UP (ref 22–31)
CREAT SERPL-MCNC: 0.82 MG/DL — SIGNIFICANT CHANGE UP (ref 0.5–1.3)
DACRYOCYTES BLD QL SMEAR: SLIGHT — SIGNIFICANT CHANGE UP
EOSINOPHIL # BLD AUTO: 0 K/UL — SIGNIFICANT CHANGE UP (ref 0–0.5)
EOSINOPHIL NFR BLD AUTO: 0 % — SIGNIFICANT CHANGE UP (ref 0–6)
GIANT PLATELETS BLD QL SMEAR: PRESENT — SIGNIFICANT CHANGE UP
GLUCOSE BLDC GLUCOMTR-MCNC: 199 MG/DL — HIGH (ref 70–99)
GLUCOSE BLDC GLUCOMTR-MCNC: 280 MG/DL — HIGH (ref 70–99)
GLUCOSE BLDC GLUCOMTR-MCNC: 318 MG/DL — HIGH (ref 70–99)
GLUCOSE BLDC GLUCOMTR-MCNC: 323 MG/DL — HIGH (ref 70–99)
GLUCOSE SERPL-MCNC: 191 MG/DL — HIGH (ref 70–99)
HCT VFR BLD CALC: 40.7 % — SIGNIFICANT CHANGE UP (ref 34.5–45)
HGB BLD-MCNC: 12.4 G/DL — SIGNIFICANT CHANGE UP (ref 11.5–15.5)
LYMPHOCYTES # BLD AUTO: 0.87 K/UL — LOW (ref 1–3.3)
LYMPHOCYTES # BLD AUTO: 5.3 % — LOW (ref 13–44)
MACROCYTES BLD QL: SLIGHT — SIGNIFICANT CHANGE UP
MAGNESIUM SERPL-MCNC: 2.3 MG/DL — SIGNIFICANT CHANGE UP (ref 1.6–2.6)
MANUAL SMEAR VERIFICATION: SIGNIFICANT CHANGE UP
MCHC RBC-ENTMCNC: 28.7 PG — SIGNIFICANT CHANGE UP (ref 27–34)
MCHC RBC-ENTMCNC: 30.5 GM/DL — LOW (ref 32–36)
MCV RBC AUTO: 94.2 FL — SIGNIFICANT CHANGE UP (ref 80–100)
MICROCYTES BLD QL: SLIGHT — SIGNIFICANT CHANGE UP
MONOCYTES # BLD AUTO: 0.43 K/UL — SIGNIFICANT CHANGE UP (ref 0–0.9)
MONOCYTES NFR BLD AUTO: 2.6 % — SIGNIFICANT CHANGE UP (ref 2–14)
MYELOCYTES NFR BLD: 0.9 % — HIGH (ref 0–0)
NEUTROPHILS # BLD AUTO: 14.95 K/UL — HIGH (ref 1.8–7.4)
NEUTROPHILS NFR BLD AUTO: 91.2 % — HIGH (ref 43–77)
OVALOCYTES BLD QL SMEAR: SLIGHT — SIGNIFICANT CHANGE UP
PHOSPHATE SERPL-MCNC: 3.5 MG/DL — SIGNIFICANT CHANGE UP (ref 2.5–4.5)
PLAT MORPH BLD: ABNORMAL
PLATELET # BLD AUTO: 292 K/UL — SIGNIFICANT CHANGE UP (ref 150–400)
POIKILOCYTOSIS BLD QL AUTO: SLIGHT — SIGNIFICANT CHANGE UP
POLYCHROMASIA BLD QL SMEAR: SLIGHT — SIGNIFICANT CHANGE UP
POTASSIUM SERPL-MCNC: 4 MMOL/L — SIGNIFICANT CHANGE UP (ref 3.5–5.3)
POTASSIUM SERPL-SCNC: 4 MMOL/L — SIGNIFICANT CHANGE UP (ref 3.5–5.3)
PROT SERPL-MCNC: 7.2 G/DL — SIGNIFICANT CHANGE UP (ref 6–8.3)
RBC # BLD: 4.32 M/UL — SIGNIFICANT CHANGE UP (ref 3.8–5.2)
RBC # FLD: 12.6 % — SIGNIFICANT CHANGE UP (ref 10.3–14.5)
RBC BLD AUTO: ABNORMAL
SODIUM SERPL-SCNC: 138 MMOL/L — SIGNIFICANT CHANGE UP (ref 135–145)
SPHEROCYTES BLD QL SMEAR: SLIGHT — SIGNIFICANT CHANGE UP
WBC # BLD: 16.39 K/UL — HIGH (ref 3.8–10.5)
WBC # FLD AUTO: 16.39 K/UL — HIGH (ref 3.8–10.5)

## 2022-01-13 PROCEDURE — 71045 X-RAY EXAM CHEST 1 VIEW: CPT | Mod: 26

## 2022-01-13 PROCEDURE — 99233 SBSQ HOSP IP/OBS HIGH 50: CPT

## 2022-01-13 RX ORDER — INSULIN LISPRO 100/ML
3 VIAL (ML) SUBCUTANEOUS
Refills: 0 | Status: DISCONTINUED | OUTPATIENT
Start: 2022-01-13 | End: 2022-01-14

## 2022-01-13 RX ORDER — INSULIN GLARGINE 100 [IU]/ML
10 INJECTION, SOLUTION SUBCUTANEOUS AT BEDTIME
Refills: 0 | Status: DISCONTINUED | OUTPATIENT
Start: 2022-01-13 | End: 2022-01-14

## 2022-01-13 RX ADMIN — Medication 6 MILLIGRAM(S): at 06:40

## 2022-01-13 RX ADMIN — Medication 6: at 12:41

## 2022-01-13 RX ADMIN — Medication 1 DROP(S): at 18:05

## 2022-01-13 RX ADMIN — ENOXAPARIN SODIUM 40 MILLIGRAM(S): 100 INJECTION SUBCUTANEOUS at 06:40

## 2022-01-13 RX ADMIN — Medication 1 TABLET(S): at 13:20

## 2022-01-13 RX ADMIN — Medication 8: at 18:03

## 2022-01-13 RX ADMIN — Medication 75 MICROGRAM(S): at 06:42

## 2022-01-13 RX ADMIN — Medication 3 UNIT(S): at 18:04

## 2022-01-13 RX ADMIN — Medication 1 DROP(S): at 06:40

## 2022-01-13 RX ADMIN — Medication 2: at 09:45

## 2022-01-13 RX ADMIN — PANTOPRAZOLE SODIUM 40 MILLIGRAM(S): 20 TABLET, DELAYED RELEASE ORAL at 06:40

## 2022-01-13 RX ADMIN — Medication 8: at 22:11

## 2022-01-13 RX ADMIN — REMDESIVIR 500 MILLIGRAM(S): 5 INJECTION INTRAVENOUS at 06:41

## 2022-01-13 RX ADMIN — Medication 3 MILLIGRAM(S): at 22:49

## 2022-01-13 RX ADMIN — INSULIN GLARGINE 10 UNIT(S): 100 INJECTION, SOLUTION SUBCUTANEOUS at 22:10

## 2022-01-13 NOTE — DIETITIAN INITIAL EVALUATION ADULT. - OTHER INFO
Pt is a 89 yo F with PMH DM, hypothyroidism, HLD, and s/p Pfizer x2 who p/w ERICKSON, decreased PO intake, and hypoxia x3d. Found to be COVID+ with BL infiltrates on CXR. Admitted to [facility] for further observation and management.  Today per discussion with daughter on phone, mother has been eating a lot less and generally needs soft/creamy foods due to poor dentition(left dentures at home).Per daughter UBW:72kg, Present recorded weight :inaccurate. Bed scale weights:68kg,IBW:53.6.BMI:26.2.127% of IBW .No N/V/D/C or BM. SKin intact. Eating <60%.Needs food changed to soft consistency.

## 2022-01-13 NOTE — DIETITIAN INITIAL EVALUATION ADULT. - ORAL INTAKE PTA/DIET HISTORY
Per daughter(on phone) mother was eating soft creamy foods ie: mashed potatoes ,yogurt, jello .Avoids concentrated sweets and juice due to DM .Poor po since mid-december.No allergies or Jain avoidances

## 2022-01-13 NOTE — PHYSICAL THERAPY INITIAL EVALUATION ADULT - PERTINENT HX OF CURRENT PROBLEM, REHAB EVAL
89 yo F with PMH DM, hypothyroidism, HLD, and s/p Pfizer x2 who p/w ERICKSON, decreased PO intake, and hypoxia x3d. Tested positive for COVID 1/2/22 and initial pulse ox readings were in the mid 90s. Over the course of the last 2d pt went to mid 80s and then 70s on day of presentation.

## 2022-01-13 NOTE — PHYSICAL THERAPY INITIAL EVALUATION ADULT - ADDITIONAL COMMENTS
Pt currently resides w/ daughter and family in elevator apartment. Primarily amb w/ SC, has RW at home as well. Denies falling within past 6 months. Daughter and family assists w/ bathing and dressing 2/2 difficulty w/ RUE fine motor movements after undergoing hand surgery. Family also assists w/ other needs, as needed.

## 2022-01-13 NOTE — DIETITIAN INITIAL EVALUATION ADULT. - PROBLEM SELECTOR PLAN 2
- as above  - d-dimer 598; ferritin and CRP pending  - f/u procal -- defer abx at present  - continue to trend inflammatory markers  - start remdesivir x5d (end 1/16)  - c/w decadron 6mg x10d (end 1/20)  - wean O2 as able

## 2022-01-13 NOTE — PROGRESS NOTE ADULT - SUBJECTIVE AND OBJECTIVE BOX
CC: Patient is a 89y old  Female who presents with a chief complaint of COVID PNA (13 Jan 2022 08:32)      INTERVAL EVENTS: MARJAN    SUBJECTIVE / INTERVAL HPI: Patient seen and examined at bedside. Pt says that cough is about the same. Denies SOB, diarrhea, abdominal pain or fevers.    ROS: negative unless otherwise stated above.    VITAL SIGNS:  Vital Signs Last 24 Hrs  T(C): 36.6 (13 Jan 2022 06:32), Max: 36.6 (12 Jan 2022 21:03)  T(F): 97.8 (13 Jan 2022 06:32), Max: 97.9 (12 Jan 2022 21:03)  HR: 66 (13 Jan 2022 06:32) (66 - 70)  BP: 131/74 (13 Jan 2022 06:32) (126/68 - 131/74)  BP(mean): --  RR: 20 (13 Jan 2022 06:32) (17 - 20)  SpO2: 90% (13 Jan 2022 06:32) (86% - 92%)      01-12-22 @ 07:01  -  01-13-22 @ 07:00  --------------------------------------------------------  IN: 0 mL / OUT: 400 mL / NET: -400 mL        PHYSICAL EXAM:    General: NAD  HEENT: MMM  Neck: supple  Cardiovascular: rhonchi at bases  Respiratory: CTA B/L; no W/R/R  Gastrointestinal: soft, NT/ND  Extremities: WWP; no edema, clubbing or cyanosis  Vascular: 2+ radial, DP/PT pulses B/L  Neurological: AAOx3; no focal deficits    MEDICATIONS:  MEDICATIONS  (STANDING):  artificial tears (preservative free) Ophthalmic Solution 1 Drop(s) Both EYES two times a day  dexAMETHasone     Tablet 6 milliGRAM(s) Oral every 24 hours  dextrose 40% Gel 15 Gram(s) Oral once  dextrose 5%. 1000 milliLiter(s) (50 mL/Hr) IV Continuous <Continuous>  dextrose 5%. 1000 milliLiter(s) (100 mL/Hr) IV Continuous <Continuous>  dextrose 50% Injectable 25 Gram(s) IV Push once  dextrose 50% Injectable 12.5 Gram(s) IV Push once  dextrose 50% Injectable 25 Gram(s) IV Push once  enoxaparin Injectable 40 milliGRAM(s) SubCutaneous every 24 hours  glucagon  Injectable 1 milliGRAM(s) IntraMuscular once  insulin lispro (ADMELOG) corrective regimen sliding scale   SubCutaneous Before meals and at bedtime  levothyroxine 75 MICROGram(s) Oral daily  melatonin 3 milliGRAM(s) Oral at bedtime  multivitamin  Chewable 1 Tablet(s) Oral daily  pantoprazole    Tablet 40 milliGRAM(s) Oral before breakfast  potassium chloride    Tablet ER 20 milliEquivalent(s) Oral once  remdesivir  IVPB   IV Intermittent   remdesivir  IVPB 100 milliGRAM(s) IV Intermittent every 24 hours    MEDICATIONS  (PRN):  acetaminophen     Tablet .. 650 milliGRAM(s) Oral every 6 hours PRN Temp greater or equal to 38C (100.4F), Mild Pain (1 - 3)  guaiFENesin Oral Liquid (Sugar-Free) 100 milliGRAM(s) Oral every 6 hours PRN Cough      ALLERGIES:  Allergies    No Known Allergies    Intolerances        LABS:                        12.4   16.39 )-----------( 292      ( 13 Jan 2022 07:19 )             40.7     01-13    138  |  98  |  18  ----------------------------<  191<H>  4.0   |  26  |  0.82    Ca    9.5      13 Jan 2022 07:19  Phos  3.5     01-13  Mg     2.3     01-13    TPro  7.2  /  Alb  3.1<L>  /  TBili  0.4  /  DBili  x   /  AST  29  /  ALT  24  /  AlkPhos  144<H>  01-13    PT/INR - ( 11 Jan 2022 20:26 )   PT: 13.9 sec;   INR: 1.19          PTT - ( 11 Jan 2022 20:26 )  PTT:28.5 sec  Urinalysis Basic - ( 12 Jan 2022 12:47 )    Color: x / Appearance: x / SG: x / pH: x  Gluc: x / Ketone: x  / Bili: x / Urobili: x   Blood: x / Protein: x / Nitrite: x   Leuk Esterase: x / RBC: < 5 /HPF / WBC < 5 /HPF   Sq Epi: x / Non Sq Epi: 0-5 /HPF / Bacteria: Present /HPF      CAPILLARY BLOOD GLUCOSE      POCT Blood Glucose.: 280 mg/dL (13 Jan 2022 11:58)      RADIOLOGY & ADDITIONAL TESTS: Reviewed.

## 2022-01-13 NOTE — PHYSICAL THERAPY INITIAL EVALUATION ADULT - DISCHARGE DISPOSITION, PT EVAL
Post op Day [1 ]    Patient resting without complaints.  No chest pain, SOB, N/V.    T(C): 36.6 (10-21-19 @ 05:36), Max: 37.1 (10-20-19 @ 16:30)  HR: 63 (10-21-19 @ 05:36) (60 - 81)  BP: 127/68 (10-21-19 @ 05:36) (125/62 - 216/85)  RR: 18 (10-21-19 @ 05:36) (14 - 21)  SpO2: 95% (10-21-19 @ 05:36) (93% - 100%)  Wt(kg): --    Exam:  Alert and Oriented, No Acute Distress  LUE elevated in splint, c/d/i  digits pink, warm, mobile, SILT, brisk cap refill <2 seconds, appropriate mild edema to dorsal aspect of foot  Calves Soft, Non-tender bilaterally                          11.9   17.35 )-----------( 299      ( 20 Oct 2019 12:26 )             34.5    10-20    133<L>  |  99  |  29<H>  ----------------------------<  201<H>  4.1   |  22  |  1.34<H>    Ca    9.3      20 Oct 2019 12:26    TPro  6.9  /  Alb  4.0  /  TBili  0.7  /  DBili  x   /  AST  18  /  ALT  17  /  AlkPhos  57  10-20 HPT w/ family assist

## 2022-01-13 NOTE — CONSULT NOTE ADULT - SUBJECTIVE AND OBJECTIVE BOX
Patient is a 89y old  Female who presents with a chief complaint of COVID PNA (12 Jan 2022 18:08)       HPI:  Pt is a 89 yo F with PMH DM, hypothyroidism, HLD, and s/p Pfizer x2 who p/w ERICKSON, decreased PO intake, and hypoxia x3d. Tested positive for COVID 1/2/22 and initial pulse ox readings were in the mid 90s. Over the course of the last 2d pt went to mid 80s and then 70s on day of presentation. Had been working with PCP Dr. Sarabia to get home O2 but they were unable to obtain. Of note, pt returned from a trip to [place] 12/19/21. Lives with daughter's family who initially tested negative when she was positive, and then later they too tested positive. Pt with mild dry cough. Denies HA, changes to vision, CP, abd pain, changes to BMs or urination, pain in extremities. At baseline is fully independent. No other concerns or complaints.     On arrival, T 98.1, , /76, RR 24 O2sat 81% RA -- 99% NRB -- 97% 4L NC. EKG with ST no ischemic changes, QTc 535. Labs with WBC 12.77, 82.1% neutrophils, K 3.6, glu 199, d-dimer 598, albumin 2.6, ferritin and procal pending. COVID+. CXR with BL infiltrates L>R. Pt given decadron 6mg. Admitted to [facility] for further observation and management. (12 Jan 2022 02:29)      PAST MEDICAL & SURGICAL HISTORY:  DM (diabetes mellitus)    HLD (hyperlipidemia)    Hypothyroid    No significant past surgical history        MEDICATIONS  (STANDING):  artificial tears (preservative free) Ophthalmic Solution 1 Drop(s) Both EYES two times a day  dexAMETHasone     Tablet 6 milliGRAM(s) Oral every 24 hours  dextrose 40% Gel 15 Gram(s) Oral once  dextrose 5%. 1000 milliLiter(s) (50 mL/Hr) IV Continuous <Continuous>  dextrose 5%. 1000 milliLiter(s) (100 mL/Hr) IV Continuous <Continuous>  dextrose 50% Injectable 25 Gram(s) IV Push once  dextrose 50% Injectable 12.5 Gram(s) IV Push once  dextrose 50% Injectable 25 Gram(s) IV Push once  enoxaparin Injectable 40 milliGRAM(s) SubCutaneous every 24 hours  glucagon  Injectable 1 milliGRAM(s) IntraMuscular once  insulin lispro (ADMELOG) corrective regimen sliding scale   SubCutaneous Before meals and at bedtime  levothyroxine 75 MICROGram(s) Oral daily  melatonin 3 milliGRAM(s) Oral at bedtime  multivitamin  Chewable 1 Tablet(s) Oral daily  pantoprazole    Tablet 40 milliGRAM(s) Oral before breakfast  potassium chloride    Tablet ER 20 milliEquivalent(s) Oral once  remdesivir  IVPB   IV Intermittent   remdesivir  IVPB 100 milliGRAM(s) IV Intermittent every 24 hours    MEDICATIONS  (PRN):  acetaminophen     Tablet .. 650 milliGRAM(s) Oral every 6 hours PRN Temp greater or equal to 38C (100.4F), Mild Pain (1 - 3)  guaiFENesin Oral Liquid (Sugar-Free) 100 milliGRAM(s) Oral every 6 hours PRN Cough        FAMILY HISTORY:  No pertinent family history in first degree relatives        CBC Full  -  ( 13 Jan 2022 07:19 )  WBC Count : 16.39 K/uL  RBC Count : 4.32 M/uL  Hemoglobin : 12.4 g/dL  Hematocrit : 40.7 %  Platelet Count - Automated : 292 K/uL  Mean Cell Volume : 94.2 fl  Mean Cell Hemoglobin : 28.7 pg  Mean Cell Hemoglobin Concentration : 30.5 gm/dL  Auto Neutrophil # : x  Auto Lymphocyte # : x  Auto Monocyte # : x  Auto Eosinophil # : x  Auto Basophil # : x  Auto Neutrophil % : x  Auto Lymphocyte % : x  Auto Monocyte % : x  Auto Eosinophil % : x  Auto Basophil % : x      01-13    138  |  98  |  18  ----------------------------<  191<H>  4.0   |  26  |  0.82    Ca    9.5      13 Jan 2022 07:19  Phos  3.5     01-13  Mg     2.3     01-13    TPro  7.2  /  Alb  3.1<L>  /  TBili  0.4  /  DBili  x   /  AST  29  /  ALT  24  /  AlkPhos  144<H>  01-13      Urinalysis Basic - ( 12 Jan 2022 12:47 )    Color: x / Appearance: x / SG: x / pH: x  Gluc: x / Ketone: x  / Bili: x / Urobili: x   Blood: x / Protein: x / Nitrite: x   Leuk Esterase: x / RBC: < 5 /HPF / WBC < 5 /HPF   Sq Epi: x / Non Sq Epi: 0-5 /HPF / Bacteria: Present /HPF          Radiology:    < from: Xray Chest 1 View AP/PA (01.11.22 @ 21:24) >  EXAM:  XR CHEST AP OR PA 1V                           PROCEDURE DATE:  01/11/2022          INTERPRETATION:  TECHNIQUE: Single portable view of the chest.    COMPARISON:  None    CLINICAL HISTORY: Shortness of Breath, Cough,  Fever    FINDINGS:    Single frontal view of the chest demonstrates bilateral lower lobe   infiltrates. The cardiomediastinal silhouette is enlarged. No acute   osseous abnormalities. Overlying EKG leads and wires are noted    IMPRESSION: Bilateral lower lobe infiltrates.                    Vital Signs Last 24 Hrs  T(C): 36.6 (13 Jan 2022 06:32), Max: 36.6 (12 Jan 2022 08:45)  T(F): 97.8 (13 Jan 2022 06:32), Max: 97.9 (12 Jan 2022 21:03)  HR: 66 (13 Jan 2022 06:32) (66 - 74)  BP: 131/74 (13 Jan 2022 06:32) (112/73 - 131/74)  BP(mean): --  RR: 20 (13 Jan 2022 06:32) (16 - 20)  SpO2: 90% (13 Jan 2022 06:32) (86% - 95%)        REVIEW OF SYSTEMS:    CONSTITUTIONAL: No fever, weight loss, or fatigue  EYES: No eye pain, visual disturbances, or discharge  ENMT:  No difficulty hearing, tinnitus, vertigo; No sinus or throat pain  NECK: No pain or stiffness  BREASTS: No pain, masses, or nipple discharge  RESPIRATORY:   per HPI  CARDIOVASCULAR: No chest pain, palpitations, dizziness, or leg swelling  GASTROINTESTINAL: No abdominal or epigastric pain. No nausea, vomiting, or hematemesis; No diarrhea or constipation. No melena or hematochezia.  GENITOURINARY: No dysuria, frequency, hematuria, or incontinence  NEUROLOGICAL: No headaches, memory loss, loss of strength, numbness, or tremors  SKIN: No itching, burning, rashes, or lesions   LYMPH NODES: No enlarged glands  ENDOCRINE: No heat or cold intolerance; No hair loss  MUSCULOSKELETAL: No joint pain or swelling; No muscle, back, or extremity pain  PSYCHIATRIC: No depression, anxiety, mood swings, or difficulty sleeping  HEME/LYMPH: No easy bruising, or bleeding gums  ALLERGY AND IMMUNOLOGIC: No hives or eczema  VASCULAR: no swelling, erythema,           Physical Exam:  on COVID isolation, in accordance with current standards limiting patient contact, please refer to exam performed on 1/12/2022    Constitutional: WDWN resting comfortably in bed; NAD; very hard of hearing  Head: NC/AT  Eyes: PERRL, EOMI, anicteric sclera  ENT: no nasal discharge; MMM  Neck: supple; no JVD  Respiratory: CTA B/L; no W/R/R; on 2L NC without signs of respiratory distress, speaking in full sentences  Cardiac: +S1/S2; RRR; no M/R/G  Gastrointestinal: soft, NT/ND; no rebound or guarding; +BSx4  Extremities: WWP, no clubbing or cyanosis; no peripheral edema  Musculoskeletal: NROM x4; no joint swelling, tenderness or erythema  Vascular: 2+ radial, DP/PT pulses B/L  Dermatologic: skin warm, dry and intact; no rashes, wounds, or scars  Neurologic: AAOx3; CNII-XII grossly intact; no focal deficits  Psychiatric: affect and characteristics of appearance, verbalizations, behaviors are appropriate          PM&R Impression:    1) deconditioned  2) no focal weakness    Recommendations/ Plan :    1) Physical therapy focusing on therapeutic exercises, bed mobility/transfer out of bed evaluation, progressive ambulation with assistive devices prn.    2) Anticipated Disposition Plan/Recs:    pending functional progress

## 2022-01-13 NOTE — PROGRESS NOTE ADULT - PROBLEM SELECTOR PLAN 1
- pt p/w 3d dec PO intake, ERICKSON, and hypoxia in the setting of known COVID+ 1/2/22, was trying to obtain home O2 via PCP but unsuccessful; initial O2sat low 90s--80s--70s on day of presentation.  - on arrival, meeting 3/4 SIRS criteria (tachycardia, tachypnea, and leukocytosis) with likely etiology pulmonary in setting of COVID-19  - CXR with BL infiltrates L>R  - UA negative  - procal normal

## 2022-01-13 NOTE — PROGRESS NOTE ADULT - PROBLEM SELECTOR PLAN 4
- pt with hx T2D, on repaglinide 2mg outpt  - A1c 8.2  - carb consistent diet  - mISS while inpt - pt with hx T2D, on repaglinide 2mg outpt  - A1c 8.2  - carb consistent diet  - starting basal bolus - 10U lantus at bedtime and 3U lispro premeal as pt is on steroids for covid right now and sugars have been running high

## 2022-01-13 NOTE — CONSULT NOTE ADULT - ASSESSMENT
per Internal Medicine     89 yo F with PMH DM, hypothyroidism, HLD, and s/p Pfizer x2 who p/w ERICKSON, decreased PO intake, and hypoxia x3d. Found to be COVID+ with BL infiltrates on CXR. Admitted to [facility] for further observation and management.     Problem/Plan - 1   ·  Problem: Acute sepsis.   ·  Plan: - pt p/w 3d dec PO intake, ERICKSON, and hypoxia in the setting of known COVID+ 1/2/22, was trying to obtain home O2 via PCP but unsuccessful; initial O2sat low 90s--80s--70s on day of presentation  - on arrival, meeting 3/4 SIRS criteria (tachycardia, tachypnea, and leukocytosis) with likely etiology pulmonary in setting of COVID-19  - CXR with BL infiltrates L>R  - f/u UA  - f/u procal  - defer abx at present.    Problem/Plan - 2   ·  Problem: 2019 novel coronavirus disease (COVID-19).   ·  Plan: - as above  - d-dimer 598; ferritin and CRP pending  - f/u procal -- defer abx at present  - continue to trend inflammatory markers  - start remdesivir x5d (end 1/16)  - c/w decadron 6mg x10d (end 1/20)  - wean O2 as able.    Problem/Plan - 3   ·  Problem: Prolonged QT interval.   ·  Plan: - EKG on arrival ST without ischemia, QTc 535  - f/u AM EKG  - avoid QTc prolonging agents.    Problem/Plan - 4   ·  Problem: Diabetes.   ·  Plan: - pt with hx T2D, on repaglinide 2mg outpt  - check A1C   - carb consistent diet  - mISS while inpt.    Problem/Plan - 5   ·  Problem: Hypothyroidism.   ·  Plan: - pt with hx hypothyroidism, on synthroid 75 mcg outpt  - c/w home med  - check TSH.    Problem/Plan - 6   ·  Problem: Hyperlipidemia.   ·  Plan: - pt with hx HLD, not on meds.    Problem/Plan - 7   ·  Problem: Nutrition, metabolism, and development symptoms.   ·  Plan: - F: none  - E: replete K<4, Mg<2  - N: carb consistent  - D: lovenox 40mg q24h  - G: protonix 40mg/d while on steroids    code: full  dispo: COVID RMF.

## 2022-01-13 NOTE — PHYSICAL THERAPY INITIAL EVALUATION ADULT - IMPAIRMENTS FOUND, PT EVAL
aerobic capacity/endurance/gait, locomotion, and balance/gross motor/muscle strength/posture/ROM/ventilation and respiration/gas exchange

## 2022-01-13 NOTE — PROGRESS NOTE ADULT - PROBLEM SELECTOR PLAN 5
- pt with hx hypothyroidism, on synthroid 75 mcg outpt  - c/w home med  - check TSH - pt with hx hypothyroidism, on synthroid 75 mcg outpt  - c/w home med  - TSH normal

## 2022-01-13 NOTE — PHYSICAL THERAPY INITIAL EVALUATION ADULT - MANUAL MUSCLE TESTING RESULTS, REHAB EVAL
Grossly 3+/5 throughout BUE in shoulder flex/ext and elbow flex/ext movements. Grossly good bilateral  strength. At least 3/5 BLE based on antigravity mobility.

## 2022-01-13 NOTE — CONSULT NOTE ADULT - CONSULT REASON
Patient Seen in: [facility] [place]      History   Patient presents with:  Urinary Symptoms    Stated Complaint: Urinary retention    HPI    60-year-old female here with complaint of sensation of fullness to lower abdomen and initi by Joaquín Banda DO at [facility] ENDOSCOPY   • HERNIA SURGERY     • HYSTERECTOMY     • LUMPECTOMY RIGHT     • OTHER      right and left heart cath   • OTHER Right     leg vein surgery   • PORT, INDWELLING, IMP      removed s/p chemo   • SINUS SURGERY Rehab evaluation lower leg: No edema. Left lower leg: No edema. Skin:     General: Skin is warm and dry. Capillary Refill: Capillary refill takes less than 2 seconds. Neurological:      General: No focal deficit present.       Mental Status: She is alert and o encounter diagnosis)  Prolapsed uterus    Disposition:  Discharge  6/7/2020  9:04 am    Follow-up:  Ana Short, [address] [place] [address]  [phone]    In 1 day  As needed    Tory Ortiz DO  [id number]  [facility], [place], operated by [facility]

## 2022-01-14 LAB
ALBUMIN SERPL ELPH-MCNC: 2.9 G/DL — LOW (ref 3.3–5)
ALP SERPL-CCNC: 80 U/L — SIGNIFICANT CHANGE UP (ref 40–120)
ALT FLD-CCNC: 20 U/L — SIGNIFICANT CHANGE UP (ref 10–45)
ANION GAP SERPL CALC-SCNC: 14 MMOL/L — SIGNIFICANT CHANGE UP (ref 5–17)
AST SERPL-CCNC: 22 U/L — SIGNIFICANT CHANGE UP (ref 10–40)
BASOPHILS # BLD AUTO: 0.07 K/UL — SIGNIFICANT CHANGE UP (ref 0–0.2)
BASOPHILS NFR BLD AUTO: 0.5 % — SIGNIFICANT CHANGE UP (ref 0–2)
BILIRUB SERPL-MCNC: 0.3 MG/DL — SIGNIFICANT CHANGE UP (ref 0.2–1.2)
BUN SERPL-MCNC: 26 MG/DL — HIGH (ref 7–23)
CALCIUM SERPL-MCNC: 9.1 MG/DL — SIGNIFICANT CHANGE UP (ref 8.4–10.5)
CHLORIDE SERPL-SCNC: 100 MMOL/L — SIGNIFICANT CHANGE UP (ref 96–108)
CO2 SERPL-SCNC: 26 MMOL/L — SIGNIFICANT CHANGE UP (ref 22–31)
CREAT SERPL-MCNC: 0.82 MG/DL — SIGNIFICANT CHANGE UP (ref 0.5–1.3)
EOSINOPHIL # BLD AUTO: 0 K/UL — SIGNIFICANT CHANGE UP (ref 0–0.5)
EOSINOPHIL NFR BLD AUTO: 0 % — SIGNIFICANT CHANGE UP (ref 0–6)
GLUCOSE BLDC GLUCOMTR-MCNC: 163 MG/DL — HIGH (ref 70–99)
GLUCOSE BLDC GLUCOMTR-MCNC: 209 MG/DL — HIGH (ref 70–99)
GLUCOSE BLDC GLUCOMTR-MCNC: 279 MG/DL — HIGH (ref 70–99)
GLUCOSE BLDC GLUCOMTR-MCNC: 285 MG/DL — HIGH (ref 70–99)
GLUCOSE SERPL-MCNC: 225 MG/DL — HIGH (ref 70–99)
HCT VFR BLD CALC: 38.9 % — SIGNIFICANT CHANGE UP (ref 34.5–45)
HGB BLD-MCNC: 12 G/DL — SIGNIFICANT CHANGE UP (ref 11.5–15.5)
IMM GRANULOCYTES NFR BLD AUTO: 2.1 % — HIGH (ref 0–1.5)
LYMPHOCYTES # BLD AUTO: 1.1 K/UL — SIGNIFICANT CHANGE UP (ref 1–3.3)
LYMPHOCYTES # BLD AUTO: 8 % — LOW (ref 13–44)
MAGNESIUM SERPL-MCNC: 2.2 MG/DL — SIGNIFICANT CHANGE UP (ref 1.6–2.6)
MCHC RBC-ENTMCNC: 28.8 PG — SIGNIFICANT CHANGE UP (ref 27–34)
MCHC RBC-ENTMCNC: 30.8 GM/DL — LOW (ref 32–36)
MCV RBC AUTO: 93.3 FL — SIGNIFICANT CHANGE UP (ref 80–100)
MONOCYTES # BLD AUTO: 0.78 K/UL — SIGNIFICANT CHANGE UP (ref 0–0.9)
MONOCYTES NFR BLD AUTO: 5.6 % — SIGNIFICANT CHANGE UP (ref 2–14)
NEUTROPHILS # BLD AUTO: 11.57 K/UL — HIGH (ref 1.8–7.4)
NEUTROPHILS NFR BLD AUTO: 83.8 % — HIGH (ref 43–77)
NRBC # BLD: 0 /100 WBCS — SIGNIFICANT CHANGE UP (ref 0–0)
PHOSPHATE SERPL-MCNC: 2.6 MG/DL — SIGNIFICANT CHANGE UP (ref 2.5–4.5)
PLATELET # BLD AUTO: 365 K/UL — SIGNIFICANT CHANGE UP (ref 150–400)
POTASSIUM SERPL-MCNC: 3.8 MMOL/L — SIGNIFICANT CHANGE UP (ref 3.5–5.3)
POTASSIUM SERPL-SCNC: 3.8 MMOL/L — SIGNIFICANT CHANGE UP (ref 3.5–5.3)
PROT SERPL-MCNC: 6.5 G/DL — SIGNIFICANT CHANGE UP (ref 6–8.3)
RBC # BLD: 4.17 M/UL — SIGNIFICANT CHANGE UP (ref 3.8–5.2)
RBC # FLD: 12.8 % — SIGNIFICANT CHANGE UP (ref 10.3–14.5)
SODIUM SERPL-SCNC: 140 MMOL/L — SIGNIFICANT CHANGE UP (ref 135–145)
WBC # BLD: 13.81 K/UL — HIGH (ref 3.8–10.5)
WBC # FLD AUTO: 13.81 K/UL — HIGH (ref 3.8–10.5)

## 2022-01-14 PROCEDURE — 99223 1ST HOSP IP/OBS HIGH 75: CPT | Mod: AI

## 2022-01-14 RX ORDER — POTASSIUM CHLORIDE 20 MEQ
20 PACKET (EA) ORAL ONCE
Refills: 0 | Status: COMPLETED | OUTPATIENT
Start: 2022-01-14 | End: 2022-01-14

## 2022-01-14 RX ORDER — INSULIN LISPRO 100/ML
4 VIAL (ML) SUBCUTANEOUS
Refills: 0 | Status: DISCONTINUED | OUTPATIENT
Start: 2022-01-14 | End: 2022-01-19

## 2022-01-14 RX ORDER — INSULIN GLARGINE 100 [IU]/ML
13 INJECTION, SOLUTION SUBCUTANEOUS AT BEDTIME
Refills: 0 | Status: DISCONTINUED | OUTPATIENT
Start: 2022-01-14 | End: 2022-01-21

## 2022-01-14 RX ADMIN — INSULIN GLARGINE 13 UNIT(S): 100 INJECTION, SOLUTION SUBCUTANEOUS at 22:51

## 2022-01-14 RX ADMIN — Medication 2: at 22:54

## 2022-01-14 RX ADMIN — REMDESIVIR 500 MILLIGRAM(S): 5 INJECTION INTRAVENOUS at 03:50

## 2022-01-14 RX ADMIN — Medication 4 UNIT(S): at 17:48

## 2022-01-14 RX ADMIN — Medication 6: at 12:56

## 2022-01-14 RX ADMIN — Medication 3 UNIT(S): at 09:07

## 2022-01-14 RX ADMIN — Medication 4: at 09:06

## 2022-01-14 RX ADMIN — Medication 3 MILLIGRAM(S): at 21:56

## 2022-01-14 RX ADMIN — Medication 1 DROP(S): at 11:35

## 2022-01-14 RX ADMIN — Medication 1 TABLET(S): at 11:33

## 2022-01-14 RX ADMIN — Medication 1 DROP(S): at 17:49

## 2022-01-14 RX ADMIN — ENOXAPARIN SODIUM 40 MILLIGRAM(S): 100 INJECTION SUBCUTANEOUS at 06:13

## 2022-01-14 RX ADMIN — Medication 75 MICROGRAM(S): at 06:14

## 2022-01-14 RX ADMIN — Medication 6 MILLIGRAM(S): at 06:13

## 2022-01-14 RX ADMIN — Medication 6: at 17:47

## 2022-01-14 RX ADMIN — PANTOPRAZOLE SODIUM 40 MILLIGRAM(S): 20 TABLET, DELAYED RELEASE ORAL at 09:07

## 2022-01-14 RX ADMIN — Medication 3 UNIT(S): at 12:56

## 2022-01-14 RX ADMIN — Medication 20 MILLIEQUIVALENT(S): at 11:39

## 2022-01-14 NOTE — PROGRESS NOTE ADULT - PROBLEM SELECTOR PLAN 4
- pt with hx T2D, on repaglinide 2mg outpt  - A1c 8.2  - carb consistent diet  - starting basal bolus - 10U lantus at bedtime and 3U lispro premeal as pt is on steroids for covid right now and sugars have been running high  - will reassess today 1/14 as FSG were elevated yesterday in 300s with 24U of coverage but she only started basal bolus at night 1/13, so will assess today.

## 2022-01-14 NOTE — PROGRESS NOTE ADULT - SUBJECTIVE AND OBJECTIVE BOX
CC: Patient is a 89y old  Female who presents with a chief complaint of COVID PNA (13 Jan 2022 14:17)      INTERVAL EVENTS: MARJAN    SUBJECTIVE / INTERVAL HPI: Patient seen and examined at bedside.     ROS: negative unless otherwise stated above.    VITAL SIGNS:  Vital Signs Last 24 Hrs  T(C): 36.6 (14 Jan 2022 06:31), Max: 36.6 (13 Jan 2022 13:01)  T(F): 97.9 (14 Jan 2022 06:31), Max: 97.9 (14 Jan 2022 06:31)  HR: 64 (14 Jan 2022 06:31) (64 - 72)  BP: 119/68 (14 Jan 2022 06:31) (107/57 - 119/68)  BP(mean): --  RR: 19 (14 Jan 2022 06:31) (19 - 20)  SpO2: 93% (14 Jan 2022 06:31) (88% - 94%)        PHYSICAL EXAM:    General: NAD  HEENT: MMM  Neck: supple  Cardiovascular: +S1/S2; RRR  Respiratory: CTA B/L; no W/R/R  Gastrointestinal: soft, NT/ND  Extremities: WWP; no edema, clubbing or cyanosis  Vascular: 2+ radial, DP/PT pulses B/L  Neurological: AAOx3; no focal deficits    MEDICATIONS:  MEDICATIONS  (STANDING):  artificial tears (preservative free) Ophthalmic Solution 1 Drop(s) Both EYES two times a day  dexAMETHasone     Tablet 6 milliGRAM(s) Oral every 24 hours  dextrose 40% Gel 15 Gram(s) Oral once  dextrose 5%. 1000 milliLiter(s) (50 mL/Hr) IV Continuous <Continuous>  dextrose 5%. 1000 milliLiter(s) (100 mL/Hr) IV Continuous <Continuous>  dextrose 50% Injectable 25 Gram(s) IV Push once  dextrose 50% Injectable 12.5 Gram(s) IV Push once  dextrose 50% Injectable 25 Gram(s) IV Push once  enoxaparin Injectable 40 milliGRAM(s) SubCutaneous every 24 hours  glucagon  Injectable 1 milliGRAM(s) IntraMuscular once  insulin glargine Injectable (LANTUS) 10 Unit(s) SubCutaneous at bedtime  insulin lispro (ADMELOG) corrective regimen sliding scale   SubCutaneous Before meals and at bedtime  insulin lispro Injectable (ADMELOG) 3 Unit(s) SubCutaneous three times a day before meals  levothyroxine 75 MICROGram(s) Oral daily  melatonin 3 milliGRAM(s) Oral at bedtime  multivitamin  Chewable 1 Tablet(s) Oral daily  pantoprazole    Tablet 40 milliGRAM(s) Oral before breakfast  potassium chloride    Tablet ER 20 milliEquivalent(s) Oral once  remdesivir  IVPB   IV Intermittent   remdesivir  IVPB 100 milliGRAM(s) IV Intermittent every 24 hours    MEDICATIONS  (PRN):  acetaminophen     Tablet .. 650 milliGRAM(s) Oral every 6 hours PRN Temp greater or equal to 38C (100.4F), Mild Pain (1 - 3)  guaiFENesin Oral Liquid (Sugar-Free) 100 milliGRAM(s) Oral every 6 hours PRN Cough      ALLERGIES:  Allergies    No Known Allergies    Intolerances        LABS:                        12.4   16.39 )-----------( 292      ( 13 Jan 2022 07:19 )             40.7     01-13    138  |  98  |  18  ----------------------------<  191<H>  4.0   |  26  |  0.82    Ca    9.5      13 Jan 2022 07:19  Phos  3.5     01-13  Mg     2.3     01-13    TPro  7.2  /  Alb  3.1<L>  /  TBili  0.4  /  DBili  x   /  AST  29  /  ALT  24  /  AlkPhos  144<H>  01-13      Urinalysis Basic - ( 12 Jan 2022 12:47 )    Color: x / Appearance: x / SG: x / pH: x  Gluc: x / Ketone: x  / Bili: x / Urobili: x   Blood: x / Protein: x / Nitrite: x   Leuk Esterase: x / RBC: < 5 /HPF / WBC < 5 /HPF   Sq Epi: x / Non Sq Epi: 0-5 /HPF / Bacteria: Present /HPF      CAPILLARY BLOOD GLUCOSE      POCT Blood Glucose.: 323 mg/dL (13 Jan 2022 21:50)      RADIOLOGY & ADDITIONAL TESTS: Reviewed. CC: Patient is a 89y old  Female who presents with a chief complaint of COVID PNA (13 Jan 2022 14:17)      INTERVAL EVENTS: MARJAN    SUBJECTIVE / INTERVAL HPI: Patient seen and examined at bedside. Pt says she doesn't like the food. Says SOB improving. Wants to get up and walking.    ROS: negative unless otherwise stated above.    VITAL SIGNS:  Vital Signs Last 24 Hrs  T(C): 36.6 (14 Jan 2022 06:31), Max: 36.6 (13 Jan 2022 13:01)  T(F): 97.9 (14 Jan 2022 06:31), Max: 97.9 (14 Jan 2022 06:31)  HR: 64 (14 Jan 2022 06:31) (64 - 72)  BP: 119/68 (14 Jan 2022 06:31) (107/57 - 119/68)  BP(mean): --  RR: 19 (14 Jan 2022 06:31) (19 - 20)  SpO2: 93% (14 Jan 2022 06:31) (88% - 94%)        PHYSICAL EXAM:    General: NAD  HEENT: MMM  Neck: supple  Cardiovascular: +S1/S2; RRR  Respiratory: improved yesterday, no accessory muscle usage, CTA b/l  Gastrointestinal: soft, NT/ND  Extremities: WWP; no edema, clubbing or cyanosis  Vascular: 2+ radial, DP/PT pulses B/L  Neurological: AAOx3; no focal deficits    MEDICATIONS:  MEDICATIONS  (STANDING):  artificial tears (preservative free) Ophthalmic Solution 1 Drop(s) Both EYES two times a day  dexAMETHasone     Tablet 6 milliGRAM(s) Oral every 24 hours  dextrose 40% Gel 15 Gram(s) Oral once  dextrose 5%. 1000 milliLiter(s) (50 mL/Hr) IV Continuous <Continuous>  dextrose 5%. 1000 milliLiter(s) (100 mL/Hr) IV Continuous <Continuous>  dextrose 50% Injectable 25 Gram(s) IV Push once  dextrose 50% Injectable 12.5 Gram(s) IV Push once  dextrose 50% Injectable 25 Gram(s) IV Push once  enoxaparin Injectable 40 milliGRAM(s) SubCutaneous every 24 hours  glucagon  Injectable 1 milliGRAM(s) IntraMuscular once  insulin glargine Injectable (LANTUS) 10 Unit(s) SubCutaneous at bedtime  insulin lispro (ADMELOG) corrective regimen sliding scale   SubCutaneous Before meals and at bedtime  insulin lispro Injectable (ADMELOG) 3 Unit(s) SubCutaneous three times a day before meals  levothyroxine 75 MICROGram(s) Oral daily  melatonin 3 milliGRAM(s) Oral at bedtime  multivitamin  Chewable 1 Tablet(s) Oral daily  pantoprazole    Tablet 40 milliGRAM(s) Oral before breakfast  potassium chloride    Tablet ER 20 milliEquivalent(s) Oral once  remdesivir  IVPB   IV Intermittent   remdesivir  IVPB 100 milliGRAM(s) IV Intermittent every 24 hours    MEDICATIONS  (PRN):  acetaminophen     Tablet .. 650 milliGRAM(s) Oral every 6 hours PRN Temp greater or equal to 38C (100.4F), Mild Pain (1 - 3)  guaiFENesin Oral Liquid (Sugar-Free) 100 milliGRAM(s) Oral every 6 hours PRN Cough      ALLERGIES:  Allergies    No Known Allergies    Intolerances        LABS:                        12.4   16.39 )-----------( 292      ( 13 Jan 2022 07:19 )             40.7     01-13    138  |  98  |  18  ----------------------------<  191<H>  4.0   |  26  |  0.82    Ca    9.5      13 Jan 2022 07:19  Phos  3.5     01-13  Mg     2.3     01-13    TPro  7.2  /  Alb  3.1<L>  /  TBili  0.4  /  DBili  x   /  AST  29  /  ALT  24  /  AlkPhos  144<H>  01-13      Urinalysis Basic - ( 12 Jan 2022 12:47 )    Color: x / Appearance: x / SG: x / pH: x  Gluc: x / Ketone: x  / Bili: x / Urobili: x   Blood: x / Protein: x / Nitrite: x   Leuk Esterase: x / RBC: < 5 /HPF / WBC < 5 /HPF   Sq Epi: x / Non Sq Epi: 0-5 /HPF / Bacteria: Present /HPF      CAPILLARY BLOOD GLUCOSE      POCT Blood Glucose.: 323 mg/dL (13 Jan 2022 21:50)      RADIOLOGY & ADDITIONAL TESTS: Reviewed.

## 2022-01-14 NOTE — PROGRESS NOTE ADULT - PROBLEM SELECTOR PLAN 1
- as above  - f/u procal -- defer abx at present  - continue to trend inflammatory markers  - c/w remdesivir x5d (end 1/16)  - c/w decadron 6mg x10d (end 1/20)  - wean O2 as able satting 96% on 6L  - desaturates on sitting at the end of the bed  - will need home O2 - continue to trend inflammatory markers  - c/w remdesivir x5d (end 1/16)  - c/w decadron 6mg x10d (end 1/20)  - wean O2 as able satting 96% on 6L but unable to wean this morning  - desaturates on sitting at the end of the bed  - will need home O2 likely on discharge

## 2022-01-15 LAB
ALBUMIN SERPL ELPH-MCNC: 2.9 G/DL — LOW (ref 3.3–5)
ALP SERPL-CCNC: 75 U/L — SIGNIFICANT CHANGE UP (ref 40–120)
ALT FLD-CCNC: 46 U/L — HIGH (ref 10–45)
ANION GAP SERPL CALC-SCNC: 11 MMOL/L — SIGNIFICANT CHANGE UP (ref 5–17)
AST SERPL-CCNC: 61 U/L — HIGH (ref 10–40)
BASOPHILS # BLD AUTO: 0.06 K/UL — SIGNIFICANT CHANGE UP (ref 0–0.2)
BASOPHILS NFR BLD AUTO: 0.5 % — SIGNIFICANT CHANGE UP (ref 0–2)
BILIRUB SERPL-MCNC: 0.4 MG/DL — SIGNIFICANT CHANGE UP (ref 0.2–1.2)
BUN SERPL-MCNC: 22 MG/DL — SIGNIFICANT CHANGE UP (ref 7–23)
CALCIUM SERPL-MCNC: 9 MG/DL — SIGNIFICANT CHANGE UP (ref 8.4–10.5)
CHLORIDE SERPL-SCNC: 101 MMOL/L — SIGNIFICANT CHANGE UP (ref 96–108)
CO2 SERPL-SCNC: 28 MMOL/L — SIGNIFICANT CHANGE UP (ref 22–31)
CREAT SERPL-MCNC: 0.75 MG/DL — SIGNIFICANT CHANGE UP (ref 0.5–1.3)
CRP SERPL-MCNC: 50 MG/L — HIGH (ref 0–4)
D DIMER BLD IA.RAPID-MCNC: 368 NG/ML DDU — HIGH
EOSINOPHIL # BLD AUTO: 0 K/UL — SIGNIFICANT CHANGE UP (ref 0–0.5)
EOSINOPHIL NFR BLD AUTO: 0 % — SIGNIFICANT CHANGE UP (ref 0–6)
FERRITIN SERPL-MCNC: 787 NG/ML — HIGH (ref 15–150)
GLUCOSE BLDC GLUCOMTR-MCNC: 126 MG/DL — HIGH (ref 70–99)
GLUCOSE BLDC GLUCOMTR-MCNC: 234 MG/DL — HIGH (ref 70–99)
GLUCOSE BLDC GLUCOMTR-MCNC: 255 MG/DL — HIGH (ref 70–99)
GLUCOSE BLDC GLUCOMTR-MCNC: 262 MG/DL — HIGH (ref 70–99)
GLUCOSE SERPL-MCNC: 86 MG/DL — SIGNIFICANT CHANGE UP (ref 70–99)
HCT VFR BLD CALC: 37.3 % — SIGNIFICANT CHANGE UP (ref 34.5–45)
HGB BLD-MCNC: 12 G/DL — SIGNIFICANT CHANGE UP (ref 11.5–15.5)
IMM GRANULOCYTES NFR BLD AUTO: 2.4 % — HIGH (ref 0–1.5)
LYMPHOCYTES # BLD AUTO: 1.62 K/UL — SIGNIFICANT CHANGE UP (ref 1–3.3)
LYMPHOCYTES # BLD AUTO: 12.3 % — LOW (ref 13–44)
MAGNESIUM SERPL-MCNC: 2.1 MG/DL — SIGNIFICANT CHANGE UP (ref 1.6–2.6)
MCHC RBC-ENTMCNC: 30.2 PG — SIGNIFICANT CHANGE UP (ref 27–34)
MCHC RBC-ENTMCNC: 32.2 GM/DL — SIGNIFICANT CHANGE UP (ref 32–36)
MCV RBC AUTO: 93.7 FL — SIGNIFICANT CHANGE UP (ref 80–100)
MONOCYTES # BLD AUTO: 0.87 K/UL — SIGNIFICANT CHANGE UP (ref 0–0.9)
MONOCYTES NFR BLD AUTO: 6.6 % — SIGNIFICANT CHANGE UP (ref 2–14)
NEUTROPHILS # BLD AUTO: 10.29 K/UL — HIGH (ref 1.8–7.4)
NEUTROPHILS NFR BLD AUTO: 78.2 % — HIGH (ref 43–77)
NRBC # BLD: 0 /100 WBCS — SIGNIFICANT CHANGE UP (ref 0–0)
PHOSPHATE SERPL-MCNC: 3.1 MG/DL — SIGNIFICANT CHANGE UP (ref 2.5–4.5)
PLATELET # BLD AUTO: 361 K/UL — SIGNIFICANT CHANGE UP (ref 150–400)
POTASSIUM SERPL-MCNC: 3.6 MMOL/L — SIGNIFICANT CHANGE UP (ref 3.5–5.3)
POTASSIUM SERPL-SCNC: 3.6 MMOL/L — SIGNIFICANT CHANGE UP (ref 3.5–5.3)
PROT SERPL-MCNC: 6.4 G/DL — SIGNIFICANT CHANGE UP (ref 6–8.3)
RBC # BLD: 3.98 M/UL — SIGNIFICANT CHANGE UP (ref 3.8–5.2)
RBC # FLD: 12.6 % — SIGNIFICANT CHANGE UP (ref 10.3–14.5)
SODIUM SERPL-SCNC: 140 MMOL/L — SIGNIFICANT CHANGE UP (ref 135–145)
WBC # BLD: 13.16 K/UL — HIGH (ref 3.8–10.5)
WBC # FLD AUTO: 13.16 K/UL — HIGH (ref 3.8–10.5)

## 2022-01-15 PROCEDURE — 71045 X-RAY EXAM CHEST 1 VIEW: CPT | Mod: 26

## 2022-01-15 RX ORDER — POTASSIUM CHLORIDE 20 MEQ
40 PACKET (EA) ORAL ONCE
Refills: 0 | Status: COMPLETED | OUTPATIENT
Start: 2022-01-15 | End: 2022-01-15

## 2022-01-15 RX ADMIN — ENOXAPARIN SODIUM 40 MILLIGRAM(S): 100 INJECTION SUBCUTANEOUS at 05:44

## 2022-01-15 RX ADMIN — Medication 3 MILLIGRAM(S): at 22:35

## 2022-01-15 RX ADMIN — PANTOPRAZOLE SODIUM 40 MILLIGRAM(S): 20 TABLET, DELAYED RELEASE ORAL at 06:07

## 2022-01-15 RX ADMIN — Medication 1 TABLET(S): at 11:41

## 2022-01-15 RX ADMIN — INSULIN GLARGINE 13 UNIT(S): 100 INJECTION, SOLUTION SUBCUTANEOUS at 22:36

## 2022-01-15 RX ADMIN — Medication 1 DROP(S): at 05:42

## 2022-01-15 RX ADMIN — Medication 6: at 17:27

## 2022-01-15 RX ADMIN — Medication 75 MICROGRAM(S): at 06:07

## 2022-01-15 RX ADMIN — Medication 6: at 12:39

## 2022-01-15 RX ADMIN — Medication 6 MILLIGRAM(S): at 05:42

## 2022-01-15 RX ADMIN — Medication 1 DROP(S): at 17:30

## 2022-01-15 RX ADMIN — Medication 4: at 22:37

## 2022-01-15 RX ADMIN — Medication 4 UNIT(S): at 12:40

## 2022-01-15 RX ADMIN — Medication 4 UNIT(S): at 09:15

## 2022-01-15 RX ADMIN — Medication 4 UNIT(S): at 17:29

## 2022-01-15 RX ADMIN — REMDESIVIR 500 MILLIGRAM(S): 5 INJECTION INTRAVENOUS at 03:05

## 2022-01-15 NOTE — PROGRESS NOTE ADULT - PROBLEM SELECTOR PLAN 4
- pt with hx T2D, on repaglinide 2mg outpt  - A1c 8.2  - carb consistent diet  - now on lantus 14 and lispro 3 TID  - still requiring coverage, assess tomorrow need for further increase

## 2022-01-15 NOTE — PROGRESS NOTE ADULT - PROBLEM SELECTOR PLAN 1
- continue to trend inflammatory markers  - c/w remdesivir x5d (end 1/16)  - c/w decadron 6mg x10d (end 1/20)  - wean O2 as able satting 96% on 6L but unable to wean this morning again  - desaturates on sitting at the end of the bed  - will need home O2 likely on discharge

## 2022-01-15 NOTE — PROGRESS NOTE ADULT - SUBJECTIVE AND OBJECTIVE BOX
CC: Patient is a 89y old  Female who presents with a chief complaint of COVID PNA (14 Jan 2022 08:20)      INTERVAL EVENTS: MARJAN    SUBJECTIVE / INTERVAL HPI: Patient seen and examined at bedside.     ROS: negative unless otherwise stated above.    VITAL SIGNS:  Vital Signs Last 24 Hrs  T(C): 36.8 (15 Omer 2022 13:01), Max: 36.8 (15 Omer 2022 13:01)  T(F): 98.2 (15 Omer 2022 13:01), Max: 98.2 (15 Omer 2022 13:01)  HR: 72 (15 Omer 2022 13:01) (64 - 72)  BP: 118/70 (15 Omer 2022 13:01) (105/67 - 118/70)  BP(mean): --  RR: 18 (15 Omer 2022 13:01) (18 - 18)  SpO2: 94% (15 Omer 2022 13:01) (93% - 95%)        PHYSICAL EXAM:    General: NAD  HEENT: MMM  Neck: supple  Cardiovascular: +S1/S2; RRR  Respiratory: rhonchi  Gastrointestinal: soft, NT/ND  Extremities: WWP; no edema, clubbing or cyanosis  Vascular: 2+ radial, DP/PT pulses B/L  Neurological: AAOx3; no focal deficits    MEDICATIONS:  MEDICATIONS  (STANDING):  artificial tears (preservative free) Ophthalmic Solution 1 Drop(s) Both EYES two times a day  dexAMETHasone     Tablet 6 milliGRAM(s) Oral every 24 hours  dextrose 40% Gel 15 Gram(s) Oral once  dextrose 5%. 1000 milliLiter(s) (50 mL/Hr) IV Continuous <Continuous>  dextrose 5%. 1000 milliLiter(s) (100 mL/Hr) IV Continuous <Continuous>  dextrose 50% Injectable 25 Gram(s) IV Push once  dextrose 50% Injectable 12.5 Gram(s) IV Push once  dextrose 50% Injectable 25 Gram(s) IV Push once  enoxaparin Injectable 40 milliGRAM(s) SubCutaneous every 24 hours  glucagon  Injectable 1 milliGRAM(s) IntraMuscular once  insulin glargine Injectable (LANTUS) 13 Unit(s) SubCutaneous at bedtime  insulin lispro (ADMELOG) corrective regimen sliding scale   SubCutaneous Before meals and at bedtime  insulin lispro Injectable (ADMELOG) 4 Unit(s) SubCutaneous three times a day before meals  levothyroxine 75 MICROGram(s) Oral daily  melatonin 3 milliGRAM(s) Oral at bedtime  multivitamin  Chewable 1 Tablet(s) Oral daily  pantoprazole    Tablet 40 milliGRAM(s) Oral before breakfast  potassium chloride    Tablet ER 20 milliEquivalent(s) Oral once  remdesivir  IVPB   IV Intermittent   remdesivir  IVPB 100 milliGRAM(s) IV Intermittent every 24 hours    MEDICATIONS  (PRN):  acetaminophen     Tablet .. 650 milliGRAM(s) Oral every 6 hours PRN Temp greater or equal to 38C (100.4F), Mild Pain (1 - 3)  guaiFENesin Oral Liquid (Sugar-Free) 100 milliGRAM(s) Oral every 6 hours PRN Cough      ALLERGIES:  Allergies    No Known Allergies    Intolerances        LABS:                        12.0   13.16 )-----------( 361      ( 15 Omer 2022 06:11 )             37.3     01-15    140  |  101  |  22  ----------------------------<  86  3.6   |  28  |  0.75    Ca    9.0      15 Oemr 2022 06:11  Phos  3.1     01-15  Mg     2.1     01-15    TPro  6.4  /  Alb  2.9<L>  /  TBili  0.4  /  DBili  x   /  AST  61<H>  /  ALT  46<H>  /  AlkPhos  75  01-15        CAPILLARY BLOOD GLUCOSE      POCT Blood Glucose.: 255 mg/dL (15 Omer 2022 12:35)      RADIOLOGY & ADDITIONAL TESTS: Reviewed.

## 2022-01-16 LAB
ALBUMIN SERPL ELPH-MCNC: 2.7 G/DL — LOW (ref 3.3–5)
ALP SERPL-CCNC: 90 U/L — SIGNIFICANT CHANGE UP (ref 40–120)
ALT FLD-CCNC: 106 U/L — HIGH (ref 10–45)
ANION GAP SERPL CALC-SCNC: 12 MMOL/L — SIGNIFICANT CHANGE UP (ref 5–17)
AST SERPL-CCNC: 96 U/L — HIGH (ref 10–40)
BASOPHILS # BLD AUTO: 0.11 K/UL — SIGNIFICANT CHANGE UP (ref 0–0.2)
BASOPHILS NFR BLD AUTO: 0.8 % — SIGNIFICANT CHANGE UP (ref 0–2)
BILIRUB SERPL-MCNC: 0.3 MG/DL — SIGNIFICANT CHANGE UP (ref 0.2–1.2)
BUN SERPL-MCNC: 20 MG/DL — SIGNIFICANT CHANGE UP (ref 7–23)
CALCIUM SERPL-MCNC: 8.7 MG/DL — SIGNIFICANT CHANGE UP (ref 8.4–10.5)
CHLORIDE SERPL-SCNC: 102 MMOL/L — SIGNIFICANT CHANGE UP (ref 96–108)
CO2 SERPL-SCNC: 26 MMOL/L — SIGNIFICANT CHANGE UP (ref 22–31)
CREAT SERPL-MCNC: 0.73 MG/DL — SIGNIFICANT CHANGE UP (ref 0.5–1.3)
EOSINOPHIL # BLD AUTO: 0 K/UL — SIGNIFICANT CHANGE UP (ref 0–0.5)
EOSINOPHIL NFR BLD AUTO: 0 % — SIGNIFICANT CHANGE UP (ref 0–6)
GLUCOSE BLDC GLUCOMTR-MCNC: 103 MG/DL — HIGH (ref 70–99)
GLUCOSE BLDC GLUCOMTR-MCNC: 146 MG/DL — HIGH (ref 70–99)
GLUCOSE BLDC GLUCOMTR-MCNC: 171 MG/DL — HIGH (ref 70–99)
GLUCOSE BLDC GLUCOMTR-MCNC: 201 MG/DL — HIGH (ref 70–99)
GLUCOSE BLDC GLUCOMTR-MCNC: 64 MG/DL — LOW (ref 70–99)
GLUCOSE SERPL-MCNC: 142 MG/DL — HIGH (ref 70–99)
HCT VFR BLD CALC: 39.5 % — SIGNIFICANT CHANGE UP (ref 34.5–45)
HGB BLD-MCNC: 12.7 G/DL — SIGNIFICANT CHANGE UP (ref 11.5–15.5)
IMM GRANULOCYTES NFR BLD AUTO: 2.7 % — HIGH (ref 0–1.5)
LYMPHOCYTES # BLD AUTO: 1.32 K/UL — SIGNIFICANT CHANGE UP (ref 1–3.3)
LYMPHOCYTES # BLD AUTO: 9.3 % — LOW (ref 13–44)
MAGNESIUM SERPL-MCNC: 2.1 MG/DL — SIGNIFICANT CHANGE UP (ref 1.6–2.6)
MCHC RBC-ENTMCNC: 29.9 PG — SIGNIFICANT CHANGE UP (ref 27–34)
MCHC RBC-ENTMCNC: 32.2 GM/DL — SIGNIFICANT CHANGE UP (ref 32–36)
MCV RBC AUTO: 92.9 FL — SIGNIFICANT CHANGE UP (ref 80–100)
MONOCYTES # BLD AUTO: 0.72 K/UL — SIGNIFICANT CHANGE UP (ref 0–0.9)
MONOCYTES NFR BLD AUTO: 5.1 % — SIGNIFICANT CHANGE UP (ref 2–14)
NEUTROPHILS # BLD AUTO: 11.66 K/UL — HIGH (ref 1.8–7.4)
NEUTROPHILS NFR BLD AUTO: 82.1 % — HIGH (ref 43–77)
NRBC # BLD: 0 /100 WBCS — SIGNIFICANT CHANGE UP (ref 0–0)
PHOSPHATE SERPL-MCNC: 3.6 MG/DL — SIGNIFICANT CHANGE UP (ref 2.5–4.5)
PLATELET # BLD AUTO: 392 K/UL — SIGNIFICANT CHANGE UP (ref 150–400)
POTASSIUM SERPL-MCNC: 3.9 MMOL/L — SIGNIFICANT CHANGE UP (ref 3.5–5.3)
POTASSIUM SERPL-SCNC: 3.9 MMOL/L — SIGNIFICANT CHANGE UP (ref 3.5–5.3)
PROT SERPL-MCNC: 6.2 G/DL — SIGNIFICANT CHANGE UP (ref 6–8.3)
RBC # BLD: 4.25 M/UL — SIGNIFICANT CHANGE UP (ref 3.8–5.2)
RBC # FLD: 12.7 % — SIGNIFICANT CHANGE UP (ref 10.3–14.5)
SODIUM SERPL-SCNC: 140 MMOL/L — SIGNIFICANT CHANGE UP (ref 135–145)
WBC # BLD: 14.2 K/UL — HIGH (ref 3.8–10.5)
WBC # FLD AUTO: 14.2 K/UL — HIGH (ref 3.8–10.5)

## 2022-01-16 PROCEDURE — 99233 SBSQ HOSP IP/OBS HIGH 50: CPT

## 2022-01-16 PROCEDURE — 71045 X-RAY EXAM CHEST 1 VIEW: CPT | Mod: 26

## 2022-01-16 RX ADMIN — Medication 4 UNIT(S): at 17:53

## 2022-01-16 RX ADMIN — Medication 6 MILLIGRAM(S): at 05:28

## 2022-01-16 RX ADMIN — Medication 3 MILLIGRAM(S): at 21:23

## 2022-01-16 RX ADMIN — REMDESIVIR 500 MILLIGRAM(S): 5 INJECTION INTRAVENOUS at 03:34

## 2022-01-16 RX ADMIN — Medication 75 MICROGRAM(S): at 07:16

## 2022-01-16 RX ADMIN — Medication 2: at 17:53

## 2022-01-16 RX ADMIN — Medication 1 DROP(S): at 05:28

## 2022-01-16 RX ADMIN — PANTOPRAZOLE SODIUM 40 MILLIGRAM(S): 20 TABLET, DELAYED RELEASE ORAL at 05:31

## 2022-01-16 RX ADMIN — Medication 1 DROP(S): at 18:14

## 2022-01-16 RX ADMIN — Medication 4: at 13:39

## 2022-01-16 RX ADMIN — INSULIN GLARGINE 13 UNIT(S): 100 INJECTION, SOLUTION SUBCUTANEOUS at 22:42

## 2022-01-16 RX ADMIN — Medication 4 UNIT(S): at 09:20

## 2022-01-16 RX ADMIN — ENOXAPARIN SODIUM 40 MILLIGRAM(S): 100 INJECTION SUBCUTANEOUS at 05:28

## 2022-01-16 RX ADMIN — Medication 1 TABLET(S): at 13:39

## 2022-01-16 RX ADMIN — Medication 4 UNIT(S): at 13:38

## 2022-01-16 NOTE — PROGRESS NOTE ADULT - PROBLEM SELECTOR PLAN 1
- continue to trend inflammatory markers  - c/w remdesivir x5d (end 1/16)  - c/w decadron 6mg x10d (end 1/20)  - try to wean daily, she has remained difficult to wean off of 6L NC. She is sating well this AM with no complaints.   - desaturates on sitting at the end of the bed  - will need home O2 on discharge

## 2022-01-16 NOTE — PROGRESS NOTE ADULT - PROBLEM SELECTOR PLAN 4
- pt with hx T2D, on repaglinide 2mg outpt  - A1c 8.2  - carb consistent diet  - continue with lantus 14 and lispro 3 TID - BS relatively controlled with this regimen

## 2022-01-16 NOTE — PROGRESS NOTE ADULT - SUBJECTIVE AND OBJECTIVE BOX
CC: Patient is a 89y old  Female who presents with a chief complaint of COVID PNA (14 Jan 2022 08:20)      INTERVAL EVENTS: MARJAN    SUBJECTIVE / INTERVAL HPI: Patient seen and examined at bedside.  no overnight events, remains on 6L - wants to go home.    ROS: negative unless otherwise stated above.    VITAL SIGNS:  Vital Signs Last 24 Hrs  T(C): 36.8 (15 Omer 2022 13:01), Max: 36.8 (15 Omer 2022 13:01)  T(F): 98.2 (15 Omer 2022 13:01), Max: 98.2 (15 Omer 2022 13:01)  HR: 72 (15 Omer 2022 13:01) (64 - 72)  BP: 118/70 (15 Omer 2022 13:01) (105/67 - 118/70)  BP(mean): --  RR: 18 (15 Omer 2022 13:01) (18 - 18)  SpO2: 94% (15 Omer 2022 13:01) (93% - 95%)        PHYSICAL EXAM:    General: NAD  HEENT: MMM  Neck: supple  Cardiovascular: +S1/S2; RRR  Respiratory: rhonchi  Gastrointestinal: soft, NT/ND  Extremities: WWP; no edema, clubbing or cyanosis  Vascular: 2+ radial, DP/PT pulses B/L  Neurological: AAOx3; no focal deficits    MEDICATIONS:  MEDICATIONS  (STANDING):  artificial tears (preservative free) Ophthalmic Solution 1 Drop(s) Both EYES two times a day  dexAMETHasone     Tablet 6 milliGRAM(s) Oral every 24 hours  dextrose 40% Gel 15 Gram(s) Oral once  dextrose 5%. 1000 milliLiter(s) (50 mL/Hr) IV Continuous <Continuous>  dextrose 5%. 1000 milliLiter(s) (100 mL/Hr) IV Continuous <Continuous>  dextrose 50% Injectable 25 Gram(s) IV Push once  dextrose 50% Injectable 12.5 Gram(s) IV Push once  dextrose 50% Injectable 25 Gram(s) IV Push once  enoxaparin Injectable 40 milliGRAM(s) SubCutaneous every 24 hours  glucagon  Injectable 1 milliGRAM(s) IntraMuscular once  insulin glargine Injectable (LANTUS) 13 Unit(s) SubCutaneous at bedtime  insulin lispro (ADMELOG) corrective regimen sliding scale   SubCutaneous Before meals and at bedtime  insulin lispro Injectable (ADMELOG) 4 Unit(s) SubCutaneous three times a day before meals  levothyroxine 75 MICROGram(s) Oral daily  melatonin 3 milliGRAM(s) Oral at bedtime  multivitamin  Chewable 1 Tablet(s) Oral daily  pantoprazole    Tablet 40 milliGRAM(s) Oral before breakfast  potassium chloride    Tablet ER 20 milliEquivalent(s) Oral once  remdesivir  IVPB   IV Intermittent   remdesivir  IVPB 100 milliGRAM(s) IV Intermittent every 24 hours    MEDICATIONS  (PRN):  acetaminophen     Tablet .. 650 milliGRAM(s) Oral every 6 hours PRN Temp greater or equal to 38C (100.4F), Mild Pain (1 - 3)  guaiFENesin Oral Liquid (Sugar-Free) 100 milliGRAM(s) Oral every 6 hours PRN Cough      ALLERGIES:  Allergies    No Known Allergies    Intolerances        LABS:                        12.0   13.16 )-----------( 361      ( 15 Omer 2022 06:11 )             37.3     01-15    140  |  101  |  22  ----------------------------<  86  3.6   |  28  |  0.75    Ca    9.0      15 Omer 2022 06:11  Phos  3.1     01-15  Mg     2.1     01-15    TPro  6.4  /  Alb  2.9<L>  /  TBili  0.4  /  DBili  x   /  AST  61<H>  /  ALT  46<H>  /  AlkPhos  75  01-15        CAPILLARY BLOOD GLUCOSE      POCT Blood Glucose.: 255 mg/dL (15 Omer 2022 12:35)      RADIOLOGY & ADDITIONAL TESTS: Reviewed.

## 2022-01-17 LAB
ALBUMIN SERPL ELPH-MCNC: 2.8 G/DL — LOW (ref 3.3–5)
ALP SERPL-CCNC: 85 U/L — SIGNIFICANT CHANGE UP (ref 40–120)
ALT FLD-CCNC: 80 U/L — HIGH (ref 10–45)
ANION GAP SERPL CALC-SCNC: 10 MMOL/L — SIGNIFICANT CHANGE UP (ref 5–17)
ANISOCYTOSIS BLD QL: SLIGHT — SIGNIFICANT CHANGE UP
AST SERPL-CCNC: 55 U/L — HIGH (ref 10–40)
BASOPHILS # BLD AUTO: 0.12 K/UL — SIGNIFICANT CHANGE UP (ref 0–0.2)
BASOPHILS NFR BLD AUTO: 0.9 % — SIGNIFICANT CHANGE UP (ref 0–2)
BILIRUB SERPL-MCNC: 0.3 MG/DL — SIGNIFICANT CHANGE UP (ref 0.2–1.2)
BUN SERPL-MCNC: 17 MG/DL — SIGNIFICANT CHANGE UP (ref 7–23)
CALCIUM SERPL-MCNC: 8.6 MG/DL — SIGNIFICANT CHANGE UP (ref 8.4–10.5)
CHLORIDE SERPL-SCNC: 100 MMOL/L — SIGNIFICANT CHANGE UP (ref 96–108)
CO2 SERPL-SCNC: 28 MMOL/L — SIGNIFICANT CHANGE UP (ref 22–31)
CREAT SERPL-MCNC: 0.77 MG/DL — SIGNIFICANT CHANGE UP (ref 0.5–1.3)
DACRYOCYTES BLD QL SMEAR: SLIGHT — SIGNIFICANT CHANGE UP
EOSINOPHIL # BLD AUTO: 0 K/UL — SIGNIFICANT CHANGE UP (ref 0–0.5)
EOSINOPHIL NFR BLD AUTO: 0 % — SIGNIFICANT CHANGE UP (ref 0–6)
GIANT PLATELETS BLD QL SMEAR: PRESENT — SIGNIFICANT CHANGE UP
GLUCOSE BLDC GLUCOMTR-MCNC: 127 MG/DL — HIGH (ref 70–99)
GLUCOSE BLDC GLUCOMTR-MCNC: 189 MG/DL — HIGH (ref 70–99)
GLUCOSE BLDC GLUCOMTR-MCNC: 269 MG/DL — HIGH (ref 70–99)
GLUCOSE BLDC GLUCOMTR-MCNC: 274 MG/DL — HIGH (ref 70–99)
GLUCOSE SERPL-MCNC: 103 MG/DL — HIGH (ref 70–99)
HCT VFR BLD CALC: 38.8 % — SIGNIFICANT CHANGE UP (ref 34.5–45)
HGB BLD-MCNC: 12.3 G/DL — SIGNIFICANT CHANGE UP (ref 11.5–15.5)
LYMPHOCYTES # BLD AUTO: 1.64 K/UL — SIGNIFICANT CHANGE UP (ref 1–3.3)
LYMPHOCYTES # BLD AUTO: 12.3 % — LOW (ref 13–44)
MACROCYTES BLD QL: SLIGHT — SIGNIFICANT CHANGE UP
MAGNESIUM SERPL-MCNC: 2.1 MG/DL — SIGNIFICANT CHANGE UP (ref 1.6–2.6)
MANUAL SMEAR VERIFICATION: SIGNIFICANT CHANGE UP
MCHC RBC-ENTMCNC: 29.2 PG — SIGNIFICANT CHANGE UP (ref 27–34)
MCHC RBC-ENTMCNC: 31.7 GM/DL — LOW (ref 32–36)
MCV RBC AUTO: 92.2 FL — SIGNIFICANT CHANGE UP (ref 80–100)
MICROCYTES BLD QL: SLIGHT — SIGNIFICANT CHANGE UP
MONOCYTES # BLD AUTO: 1.05 K/UL — HIGH (ref 0–0.9)
MONOCYTES NFR BLD AUTO: 7.9 % — SIGNIFICANT CHANGE UP (ref 2–14)
MYELOCYTES NFR BLD: 1.7 % — HIGH (ref 0–0)
NEUTROPHILS # BLD AUTO: 10.16 K/UL — HIGH (ref 1.8–7.4)
NEUTROPHILS NFR BLD AUTO: 76.3 % — SIGNIFICANT CHANGE UP (ref 43–77)
OVALOCYTES BLD QL SMEAR: SLIGHT — SIGNIFICANT CHANGE UP
PHOSPHATE SERPL-MCNC: 3.4 MG/DL — SIGNIFICANT CHANGE UP (ref 2.5–4.5)
PLAT MORPH BLD: ABNORMAL
PLATELET # BLD AUTO: 375 K/UL — SIGNIFICANT CHANGE UP (ref 150–400)
POLYCHROMASIA BLD QL SMEAR: SLIGHT — SIGNIFICANT CHANGE UP
POTASSIUM SERPL-MCNC: 3.5 MMOL/L — SIGNIFICANT CHANGE UP (ref 3.5–5.3)
POTASSIUM SERPL-SCNC: 3.5 MMOL/L — SIGNIFICANT CHANGE UP (ref 3.5–5.3)
PROT SERPL-MCNC: 6 G/DL — SIGNIFICANT CHANGE UP (ref 6–8.3)
RBC # BLD: 4.21 M/UL — SIGNIFICANT CHANGE UP (ref 3.8–5.2)
RBC # FLD: 13 % — SIGNIFICANT CHANGE UP (ref 10.3–14.5)
RBC BLD AUTO: ABNORMAL
SODIUM SERPL-SCNC: 138 MMOL/L — SIGNIFICANT CHANGE UP (ref 135–145)
SPHEROCYTES BLD QL SMEAR: SLIGHT — SIGNIFICANT CHANGE UP
VARIANT LYMPHS # BLD: 0.9 % — SIGNIFICANT CHANGE UP (ref 0–6)
WBC # BLD: 13.31 K/UL — HIGH (ref 3.8–10.5)
WBC # FLD AUTO: 13.31 K/UL — HIGH (ref 3.8–10.5)

## 2022-01-17 PROCEDURE — 99233 SBSQ HOSP IP/OBS HIGH 50: CPT

## 2022-01-17 RX ORDER — POTASSIUM CHLORIDE 20 MEQ
40 PACKET (EA) ORAL ONCE
Refills: 0 | Status: COMPLETED | OUTPATIENT
Start: 2022-01-17 | End: 2022-01-17

## 2022-01-17 RX ORDER — INSULIN LISPRO 100/ML
VIAL (ML) SUBCUTANEOUS
Refills: 0 | Status: DISCONTINUED | OUTPATIENT
Start: 2022-01-17 | End: 2022-01-21

## 2022-01-17 RX ADMIN — Medication 1 TABLET(S): at 13:12

## 2022-01-17 RX ADMIN — Medication 1 DROP(S): at 05:47

## 2022-01-17 RX ADMIN — Medication 3 MILLIGRAM(S): at 22:27

## 2022-01-17 RX ADMIN — Medication 1: at 22:27

## 2022-01-17 RX ADMIN — PANTOPRAZOLE SODIUM 40 MILLIGRAM(S): 20 TABLET, DELAYED RELEASE ORAL at 05:50

## 2022-01-17 RX ADMIN — Medication 3: at 18:08

## 2022-01-17 RX ADMIN — INSULIN GLARGINE 13 UNIT(S): 100 INJECTION, SOLUTION SUBCUTANEOUS at 22:27

## 2022-01-17 RX ADMIN — Medication 40 MILLIEQUIVALENT(S): at 11:07

## 2022-01-17 RX ADMIN — ENOXAPARIN SODIUM 40 MILLIGRAM(S): 100 INJECTION SUBCUTANEOUS at 05:47

## 2022-01-17 RX ADMIN — Medication 4 UNIT(S): at 09:19

## 2022-01-17 RX ADMIN — Medication 4 UNIT(S): at 18:07

## 2022-01-17 RX ADMIN — Medication 3: at 12:22

## 2022-01-17 RX ADMIN — Medication 6 MILLIGRAM(S): at 05:48

## 2022-01-17 RX ADMIN — Medication 75 MICROGRAM(S): at 05:48

## 2022-01-17 RX ADMIN — Medication 4 UNIT(S): at 12:21

## 2022-01-17 NOTE — OCCUPATIONAL THERAPY INITIAL EVALUATION ADULT - ADDITIONAL COMMENTS
Pt lives with daughter and grandchildren in an elevator accessible apartment. Per chart, pt required assistance for bathing and dressing 2/2 R hand fine motor impairment s/p surgery. Family assists with IADLs. Pt states that she uses a can for in-home ambulation, however uses a rolling walker for community ambulation/doctor's appointments. Pt reports that she uses no other AD/AE.

## 2022-01-17 NOTE — PROGRESS NOTE ADULT - SUBJECTIVE AND OBJECTIVE BOX
CC: Patient is a 89y old  Female who presents with a chief complaint of COVID PNA (16 Jan 2022 13:36)      INTERVAL EVENTS: MARJAN    SUBJECTIVE / INTERVAL HPI: Patient seen and examined at bedside.     ROS: negative unless otherwise stated above.    VITAL SIGNS:  Vital Signs Last 24 Hrs  T(C): 36.2 (17 Jan 2022 05:59), Max: 36.8 (16 Jan 2022 20:49)  T(F): 97.2 (17 Jan 2022 05:59), Max: 98.2 (16 Jan 2022 20:49)  HR: 68 (17 Jan 2022 05:59) (65 - 68)  BP: 105/58 (17 Jan 2022 05:59) (105/58 - 113/67)  BP(mean): --  RR: 18 (17 Jan 2022 05:59) (18 - 18)  SpO2: 94% (17 Jan 2022 05:59) (93% - 94%)      01-16-22 @ 07:01  -  01-17-22 @ 07:00  --------------------------------------------------------  IN: 0 mL / OUT: 500 mL / NET: -500 mL        PHYSICAL EXAM:    General: NAD  HEENT: MMM  Neck: supple  Cardiovascular: +S1/S2; RRR  Respiratory: CTA B/L; no W/R/R  Gastrointestinal: soft, NT/ND  Extremities: WWP; no edema, clubbing or cyanosis  Vascular: 2+ radial, DP/PT pulses B/L  Neurological: AAOx3; no focal deficits    MEDICATIONS:  MEDICATIONS  (STANDING):  artificial tears (preservative free) Ophthalmic Solution 1 Drop(s) Both EYES two times a day  dexAMETHasone     Tablet 6 milliGRAM(s) Oral every 24 hours  dextrose 40% Gel 15 Gram(s) Oral once  dextrose 5%. 1000 milliLiter(s) (50 mL/Hr) IV Continuous <Continuous>  dextrose 5%. 1000 milliLiter(s) (100 mL/Hr) IV Continuous <Continuous>  dextrose 50% Injectable 25 Gram(s) IV Push once  dextrose 50% Injectable 12.5 Gram(s) IV Push once  dextrose 50% Injectable 25 Gram(s) IV Push once  enoxaparin Injectable 40 milliGRAM(s) SubCutaneous every 24 hours  glucagon  Injectable 1 milliGRAM(s) IntraMuscular once  insulin glargine Injectable (LANTUS) 13 Unit(s) SubCutaneous at bedtime  insulin lispro (ADMELOG) corrective regimen sliding scale   SubCutaneous Before meals and at bedtime  insulin lispro Injectable (ADMELOG) 4 Unit(s) SubCutaneous three times a day before meals  levothyroxine 75 MICROGram(s) Oral daily  melatonin 3 milliGRAM(s) Oral at bedtime  multivitamin  Chewable 1 Tablet(s) Oral daily  pantoprazole    Tablet 40 milliGRAM(s) Oral before breakfast  potassium chloride    Tablet ER 20 milliEquivalent(s) Oral once    MEDICATIONS  (PRN):  acetaminophen     Tablet .. 650 milliGRAM(s) Oral every 6 hours PRN Temp greater or equal to 38C (100.4F), Mild Pain (1 - 3)  guaiFENesin Oral Liquid (Sugar-Free) 100 milliGRAM(s) Oral every 6 hours PRN Cough      ALLERGIES:  Allergies    No Known Allergies    Intolerances        LABS:                        12.7   14.20 )-----------( 392      ( 16 Jan 2022 08:34 )             39.5     01-16    140  |  102  |  20  ----------------------------<  142<H>  3.9   |  26  |  0.73    Ca    8.7      16 Jan 2022 08:34  Phos  3.6     01-16  Mg     2.1     01-16    TPro  6.2  /  Alb  2.7<L>  /  TBili  0.3  /  DBili  x   /  AST  96<H>  /  ALT  106<H>  /  AlkPhos  90  01-16        CAPILLARY BLOOD GLUCOSE      POCT Blood Glucose.: 103 mg/dL (16 Jan 2022 22:39)      RADIOLOGY & ADDITIONAL TESTS: Reviewed. CC: Patient is a 89y old  Female who presents with a chief complaint of COVID PNA (16 Jan 2022 13:36)      INTERVAL EVENTS: MARJAN    SUBJECTIVE / INTERVAL HPI: Patient seen and examined at bedside. Pt says she feels very tired. Pt wants     ROS: negative unless otherwise stated above.    VITAL SIGNS:  Vital Signs Last 24 Hrs  T(C): 36.2 (17 Jan 2022 05:59), Max: 36.8 (16 Jan 2022 20:49)  T(F): 97.2 (17 Jan 2022 05:59), Max: 98.2 (16 Jan 2022 20:49)  HR: 68 (17 Jan 2022 05:59) (65 - 68)  BP: 105/58 (17 Jan 2022 05:59) (105/58 - 113/67)  BP(mean): --  RR: 18 (17 Jan 2022 05:59) (18 - 18)  SpO2: 94% (17 Jan 2022 05:59) (93% - 94%)      01-16-22 @ 07:01  -  01-17-22 @ 07:00  --------------------------------------------------------  IN: 0 mL / OUT: 500 mL / NET: -500 mL        PHYSICAL EXAM:    General: NAD  HEENT: MMM  Neck: supple  Cardiovascular: +S1/S2; RRR  Respiratory: CTA B/L; no W/R/R  Gastrointestinal: soft, NT/ND  Extremities: WWP; no edema, clubbing or cyanosis  Vascular: 2+ radial, DP/PT pulses B/L  Neurological: AAOx3; no focal deficits    MEDICATIONS:  MEDICATIONS  (STANDING):  artificial tears (preservative free) Ophthalmic Solution 1 Drop(s) Both EYES two times a day  dexAMETHasone     Tablet 6 milliGRAM(s) Oral every 24 hours  dextrose 40% Gel 15 Gram(s) Oral once  dextrose 5%. 1000 milliLiter(s) (50 mL/Hr) IV Continuous <Continuous>  dextrose 5%. 1000 milliLiter(s) (100 mL/Hr) IV Continuous <Continuous>  dextrose 50% Injectable 25 Gram(s) IV Push once  dextrose 50% Injectable 12.5 Gram(s) IV Push once  dextrose 50% Injectable 25 Gram(s) IV Push once  enoxaparin Injectable 40 milliGRAM(s) SubCutaneous every 24 hours  glucagon  Injectable 1 milliGRAM(s) IntraMuscular once  insulin glargine Injectable (LANTUS) 13 Unit(s) SubCutaneous at bedtime  insulin lispro (ADMELOG) corrective regimen sliding scale   SubCutaneous Before meals and at bedtime  insulin lispro Injectable (ADMELOG) 4 Unit(s) SubCutaneous three times a day before meals  levothyroxine 75 MICROGram(s) Oral daily  melatonin 3 milliGRAM(s) Oral at bedtime  multivitamin  Chewable 1 Tablet(s) Oral daily  pantoprazole    Tablet 40 milliGRAM(s) Oral before breakfast  potassium chloride    Tablet ER 20 milliEquivalent(s) Oral once    MEDICATIONS  (PRN):  acetaminophen     Tablet .. 650 milliGRAM(s) Oral every 6 hours PRN Temp greater or equal to 38C (100.4F), Mild Pain (1 - 3)  guaiFENesin Oral Liquid (Sugar-Free) 100 milliGRAM(s) Oral every 6 hours PRN Cough      ALLERGIES:  Allergies    No Known Allergies    Intolerances        LABS:                        12.7   14.20 )-----------( 392      ( 16 Jan 2022 08:34 )             39.5     01-16    140  |  102  |  20  ----------------------------<  142<H>  3.9   |  26  |  0.73    Ca    8.7      16 Jan 2022 08:34  Phos  3.6     01-16  Mg     2.1     01-16    TPro  6.2  /  Alb  2.7<L>  /  TBili  0.3  /  DBili  x   /  AST  96<H>  /  ALT  106<H>  /  AlkPhos  90  01-16        CAPILLARY BLOOD GLUCOSE      POCT Blood Glucose.: 103 mg/dL (16 Jan 2022 22:39)      RADIOLOGY & ADDITIONAL TESTS: Reviewed.

## 2022-01-17 NOTE — OCCUPATIONAL THERAPY INITIAL EVALUATION ADULT - DIAGNOSIS, OT EVAL
Pt presents with impaired activity tolerance/endurance, generalized weakness, decreased postural control, and decreased sitting/standing balance, impacting ability to perform functional mobility/ADLs.

## 2022-01-17 NOTE — OCCUPATIONAL THERAPY INITIAL EVALUATION ADULT - GENERAL OBSERVATIONS, REHAB EVAL
Pt received semi supine in bed, NAD, +heplock, O2 via NC (6L) +prima fit. Pt A&Ox4, agreeable to OT, and tolerated session fairly.

## 2022-01-17 NOTE — OCCUPATIONAL THERAPY INITIAL EVALUATION ADULT - MODALITIES TREATMENT COMMENTS
Pt unable to tolerate ambulation 2/2 demo fatigue/SOB and increasing unsteadiness when attempting to take steps. Pt demo O2 desat from 93% to 88% after standing, with O2 improving to 91% after ~1 min rest sitting EOB.

## 2022-01-17 NOTE — PROGRESS NOTE ADULT - PROBLEM SELECTOR PLAN 1
- continue to trend inflammatory markers  - c/w remdesivir x5d (end 1/16)  - c/w decadron 6mg x10d (end 1/20)  - try to wean daily, she has remained difficult to wean off of 6L NC. She is sating well this AM with no complaints.   - desaturates on sitting at the end of the bed  - will need home O2 on discharge - continue to trend inflammatory markers  - c/w remdesivir x5d (end 1/16)  - c/w decadron 6mg x10d (end 1/20)  - try to wean daily, she has remained difficult to wean off of 6L NC. She is sating well this AM on the 6L with no complaints.   - desaturates on sitting at the end of the bed  - will likely need home O2 on discharge  - only complains of fatigue/weakness

## 2022-01-18 LAB
ALBUMIN SERPL ELPH-MCNC: 2.9 G/DL — LOW (ref 3.3–5)
ALP SERPL-CCNC: 90 U/L — SIGNIFICANT CHANGE UP (ref 40–120)
ALT FLD-CCNC: 71 U/L — HIGH (ref 10–45)
ANION GAP SERPL CALC-SCNC: 12 MMOL/L — SIGNIFICANT CHANGE UP (ref 5–17)
AST SERPL-CCNC: 46 U/L — HIGH (ref 10–40)
BASOPHILS # BLD AUTO: 0.1 K/UL — SIGNIFICANT CHANGE UP (ref 0–0.2)
BASOPHILS NFR BLD AUTO: 0.7 % — SIGNIFICANT CHANGE UP (ref 0–2)
BILIRUB SERPL-MCNC: 0.4 MG/DL — SIGNIFICANT CHANGE UP (ref 0.2–1.2)
BUN SERPL-MCNC: 19 MG/DL — SIGNIFICANT CHANGE UP (ref 7–23)
CALCIUM SERPL-MCNC: 8.6 MG/DL — SIGNIFICANT CHANGE UP (ref 8.4–10.5)
CHLORIDE SERPL-SCNC: 102 MMOL/L — SIGNIFICANT CHANGE UP (ref 96–108)
CO2 SERPL-SCNC: 25 MMOL/L — SIGNIFICANT CHANGE UP (ref 22–31)
CREAT SERPL-MCNC: 0.76 MG/DL — SIGNIFICANT CHANGE UP (ref 0.5–1.3)
CRP SERPL-MCNC: 23.8 MG/L — HIGH (ref 0–4)
D DIMER BLD IA.RAPID-MCNC: 519 NG/ML DDU — HIGH
EOSINOPHIL # BLD AUTO: 0 K/UL — SIGNIFICANT CHANGE UP (ref 0–0.5)
EOSINOPHIL NFR BLD AUTO: 0 % — SIGNIFICANT CHANGE UP (ref 0–6)
ERYTHROCYTE [SEDIMENTATION RATE] IN BLOOD: 81 MM/HR — HIGH
FERRITIN SERPL-MCNC: 703 NG/ML — HIGH (ref 15–150)
GLUCOSE BLDC GLUCOMTR-MCNC: 113 MG/DL — HIGH (ref 70–99)
GLUCOSE BLDC GLUCOMTR-MCNC: 208 MG/DL — HIGH (ref 70–99)
GLUCOSE BLDC GLUCOMTR-MCNC: 251 MG/DL — HIGH (ref 70–99)
GLUCOSE BLDC GLUCOMTR-MCNC: 267 MG/DL — HIGH (ref 70–99)
GLUCOSE SERPL-MCNC: 92 MG/DL — SIGNIFICANT CHANGE UP (ref 70–99)
HCT VFR BLD CALC: 42.3 % — SIGNIFICANT CHANGE UP (ref 34.5–45)
HGB BLD-MCNC: 13.5 G/DL — SIGNIFICANT CHANGE UP (ref 11.5–15.5)
IMM GRANULOCYTES NFR BLD AUTO: 2.8 % — HIGH (ref 0–1.5)
LDH SERPL L TO P-CCNC: SIGNIFICANT CHANGE UP U/L (ref 50–242)
LYMPHOCYTES # BLD AUTO: 16.1 % — SIGNIFICANT CHANGE UP (ref 13–44)
LYMPHOCYTES # BLD AUTO: 2.33 K/UL — SIGNIFICANT CHANGE UP (ref 1–3.3)
MAGNESIUM SERPL-MCNC: 2.1 MG/DL — SIGNIFICANT CHANGE UP (ref 1.6–2.6)
MCHC RBC-ENTMCNC: 30.1 PG — SIGNIFICANT CHANGE UP (ref 27–34)
MCHC RBC-ENTMCNC: 31.9 GM/DL — LOW (ref 32–36)
MCV RBC AUTO: 94.2 FL — SIGNIFICANT CHANGE UP (ref 80–100)
MONOCYTES # BLD AUTO: 0.78 K/UL — SIGNIFICANT CHANGE UP (ref 0–0.9)
MONOCYTES NFR BLD AUTO: 5.4 % — SIGNIFICANT CHANGE UP (ref 2–14)
NEUTROPHILS # BLD AUTO: 10.87 K/UL — HIGH (ref 1.8–7.4)
NEUTROPHILS NFR BLD AUTO: 75 % — SIGNIFICANT CHANGE UP (ref 43–77)
NRBC # BLD: 0 /100 WBCS — SIGNIFICANT CHANGE UP (ref 0–0)
PHOSPHATE SERPL-MCNC: 3.7 MG/DL — SIGNIFICANT CHANGE UP (ref 2.5–4.5)
PLATELET # BLD AUTO: 310 K/UL — SIGNIFICANT CHANGE UP (ref 150–400)
POTASSIUM SERPL-MCNC: 4.3 MMOL/L — SIGNIFICANT CHANGE UP (ref 3.5–5.3)
POTASSIUM SERPL-SCNC: 4.3 MMOL/L — SIGNIFICANT CHANGE UP (ref 3.5–5.3)
PROT SERPL-MCNC: 6.4 G/DL — SIGNIFICANT CHANGE UP (ref 6–8.3)
RBC # BLD: 4.49 M/UL — SIGNIFICANT CHANGE UP (ref 3.8–5.2)
RBC # FLD: 13 % — SIGNIFICANT CHANGE UP (ref 10.3–14.5)
SODIUM SERPL-SCNC: 139 MMOL/L — SIGNIFICANT CHANGE UP (ref 135–145)
WBC # BLD: 14.49 K/UL — HIGH (ref 3.8–10.5)
WBC # FLD AUTO: 14.49 K/UL — HIGH (ref 3.8–10.5)

## 2022-01-18 PROCEDURE — 99497 ADVNCD CARE PLAN 30 MIN: CPT | Mod: 25

## 2022-01-18 PROCEDURE — 99358 PROLONG SERVICE W/O CONTACT: CPT | Mod: NC

## 2022-01-18 PROCEDURE — 71045 X-RAY EXAM CHEST 1 VIEW: CPT | Mod: 26

## 2022-01-18 PROCEDURE — 99223 1ST HOSP IP/OBS HIGH 75: CPT

## 2022-01-18 RX ADMIN — PANTOPRAZOLE SODIUM 40 MILLIGRAM(S): 20 TABLET, DELAYED RELEASE ORAL at 07:40

## 2022-01-18 RX ADMIN — Medication 75 MICROGRAM(S): at 06:00

## 2022-01-18 RX ADMIN — Medication 3 MILLIGRAM(S): at 22:37

## 2022-01-18 RX ADMIN — Medication 1 DROP(S): at 05:59

## 2022-01-18 RX ADMIN — Medication 3: at 17:57

## 2022-01-18 RX ADMIN — Medication 4 UNIT(S): at 17:56

## 2022-01-18 RX ADMIN — Medication 2: at 12:40

## 2022-01-18 RX ADMIN — Medication 1 TABLET(S): at 12:01

## 2022-01-18 RX ADMIN — Medication 1 DROP(S): at 18:35

## 2022-01-18 RX ADMIN — Medication 6 MILLIGRAM(S): at 06:01

## 2022-01-18 RX ADMIN — Medication 3: at 22:43

## 2022-01-18 RX ADMIN — Medication 4 UNIT(S): at 12:41

## 2022-01-18 RX ADMIN — INSULIN GLARGINE 13 UNIT(S): 100 INJECTION, SOLUTION SUBCUTANEOUS at 22:53

## 2022-01-18 RX ADMIN — ENOXAPARIN SODIUM 40 MILLIGRAM(S): 100 INJECTION SUBCUTANEOUS at 05:59

## 2022-01-18 NOTE — CONSULT NOTE ADULT - ASSESSMENT
89 yo F with PMH DM, hypothyroidism, HLD, and s/p Pfizer x2 who presented 1/12 with ERICKSON, decreased PO intake, and worsening hypoxia. admitted for further management of COVID+ 1/2/22. Palliative consulted for [facility] for geriatric pt with COVID.

## 2022-01-18 NOTE — CONSULT NOTE ADULT - PROBLEM SELECTOR RECOMMENDATION 4
.  - Pt with capacity to make informed medical decisions using [place]    - Daughter Micaela #[phone]  - Code status reviewed, Micaela is clear that she not ready to further discuss code status  - Micaela requests that code status is not addressed to patient without dtr present, prefers pt dc home with home PT versus transfer to [facility], wants to discuss pt's eligibility for HHA with unit SW/CM  - cw Full code, aggressive mgmt

## 2022-01-18 NOTE — CONSULT NOTE ADULT - PROBLEM SELECTOR RECOMMENDATION 2
.  iso COVID PNA. on 7L nc.   - rec guaifenesin q6 PRN cough  - consider adding tessalon Perles 100 PO TID PRN alternating with guaifenesin

## 2022-01-18 NOTE — CONSULT NOTE ADULT - CONVERSATION DETAILS
Pts chart reviewed. ACP encounter occurred over the phone as hospital visitation suspended due to national health emergency.  Reached out to pts daughter for medical updates and emotional support. Discussed respiratory status, pt continues to require 7L NC and will need to be comfortable on 4-5L before being discharged home. Daughter is looking for home PT services ; she does not want pt to go to [facility].     Code status explored. Daughter is clear that her mother would want full resuscitation. Micaela does not want to speak further about advance directives as she believes the conversation is premature. Cautioned that given pts advanced age and debility as a result from hospitalization, it may be unlikely that pt would return to functional and cognitive statuses if resuscitated, intubated. All questions answered. Emotional support provided.     Total time: 30 minutes

## 2022-01-18 NOTE — CONSULT NOTE ADULT - SUBJECTIVE AND OBJECTIVE BOX
HPI:  89 yo F with PMH DM, hypothyroidism, HLD, and s/p Pfizer x2 who presented 1/12 [facility], decreased PO intake, and hypoxia x3d. Tested positive for COVID 1/2/22 and initial pulse ox readings were in the mid 90s.     Per chart review, over the course of the last 2d pt went to mid 80s and then 70s on day of presentation. Had been working with PCP Dr. Sarabia to get home O2 but they were unable to obtain. Of note, pt returned from a trip to [place] 12/19/21. Lives with daughter's family who initially tested negative when she was positive, and then later they too tested positive. Pt with mild dry cough. Denies HA, changes to vision, CP, abd pain, changes to BMs or urination, pain in extremities. At baseline is fully independent. No other concerns or complaints.     On arrival, T 98.1, , /76, RR 24 O2sat 81% RA -- 99% NRB -- 97% 4L NC. EKG with ST no ischemic changes, QTc 535. Labs with WBC 12.77, 82.1% neutrophils, K 3.6, glu 199, d-dimer 598, albumin 2.6, ferritin and procal pending. COVID+. CXR with BL infiltrates L>R. Pt given decadron 6mg. Admitted to [facility] for further observation and management. (12 Jan 2022 02:29)    Palliative care consulted for [facility] for geriatric patient with Covid.       PRESENT SYMPTOMS:   [ ]Unable to obtain due to poor mentation   Source if other than patient:  [ ]Family   [ ]Team     Pain:  Location :        Quality:  Radiation:  Timing:  Aggravating factors:  Minimal acceptable level (0-10 scale):   Severity in last 24h (0-10 scale) :  Current score (0-10 scale):  Improves with:     PAIN AD Score:   [email or web address] (press ctrl +  left click to view)    If [ ], pt denies symptom.   Dyspnea:         [ ]Mild [ ]Moderate [ ]Severe  Anxiety:           [ ]Mild [ ]Moderate [ ]Severe  Fatigue:           [ ]Mild [ ]Moderate [ ]Severe  Nausea:           [ ]Mild [ ]Moderate [ ]Severe  Loss of appetite:     [ ]Mild [ ]Moderate [ ]Severe  Constipation:   [ ]Mild [ ]Moderate [ ]Severe  LBM   Grief Present   [ ] Yes   [ ] No   Other Symptoms:    [ ]All other review of systems negative     Opiate Naive (Y/N):   iStop reviewed (Y/N): Yes.   No Rx found on iStop review (Ref#:           PAST MEDICAL & SURGICAL HISTORY:  DM (diabetes mellitus)    HLD (hyperlipidemia)    Hypothyroid    No significant past surgical history        FAMILY HISTORY:  No pertinent family history in first degree relatives     Reviewed and found non contributory in mother or father    SOCIAL HISTORY:   - Support system: [ ] strong [ ] adequate [ ] inadequate    PSYCHOSOCIAL ASSESSMENT:  - Cheondoism/Spiritual practice:  - Role of organized Orthodox [ ] important [ ] some [ ] unable to assess dt pt mentation     - Coping: [ ] well [ ] with difficulty [ ] poor coping [ ] unable to assess dt pt mentation  - What is most important to patient?  - What worries patient the most?  - Is patient at peace? :     Allergies    No Known Allergies    Intolerances        Medications:      MEDICATIONS  (STANDING):  artificial tears (preservative free) Ophthalmic Solution 1 Drop(s) Both EYES two times a day  dexAMETHasone     Tablet 6 milliGRAM(s) Oral every 24 hours  dextrose 40% Gel 15 Gram(s) Oral once  dextrose 5%. 1000 milliLiter(s) (50 mL/Hr) IV Continuous <Continuous>  dextrose 5%. 1000 milliLiter(s) (100 mL/Hr) IV Continuous <Continuous>  dextrose 50% Injectable 25 Gram(s) IV Push once  dextrose 50% Injectable 12.5 Gram(s) IV Push once  dextrose 50% Injectable 25 Gram(s) IV Push once  enoxaparin Injectable 40 milliGRAM(s) SubCutaneous every 24 hours  glucagon  Injectable 1 milliGRAM(s) IntraMuscular once  insulin glargine Injectable (LANTUS) 13 Unit(s) SubCutaneous at bedtime  insulin lispro (ADMELOG) corrective regimen sliding scale   SubCutaneous Before meals and at bedtime  insulin lispro Injectable (ADMELOG) 6 Unit(s) SubCutaneous three times a day before meals  levothyroxine 75 MICROGram(s) Oral daily  melatonin 3 milliGRAM(s) Oral at bedtime  multivitamin  Chewable 1 Tablet(s) Oral daily  pantoprazole    Tablet 40 milliGRAM(s) Oral before breakfast  polyethylene glycol 3350 17 Gram(s) Oral every 12 hours  senna 2 Tablet(s) Oral at bedtime    MEDICATIONS  (PRN):  acetaminophen     Tablet .. 650 milliGRAM(s) Oral every 6 hours PRN Temp greater or equal to 38C (100.4F), Mild Pain (1 - 3)  guaiFENesin Oral Liquid (Sugar-Free) 100 milliGRAM(s) Oral every 6 hours PRN Cough      Labs:    CBC:                        12.4   13.08 )-----------( 357      ( 20 Jan 2022 08:01 )             40.4     CMP:    01-20    138  |  101  |  19  ----------------------------<  116<H>  4.3   |  28  |  0.79    Ca    9.0      20 Jan 2022 08:01  Phos  3.9     01-20  Mg     2.2     01-20    TPro  6.3  /  Alb  2.9<L>  /  TBili  0.4  /  DBili  x   /  AST  33  /  ALT  55<H>  /  AlkPhos  82  01-20           RADIOLOGY & ADDITIONAL STUDIES:  Imaging:  Reviewed    PROTEIN CALORIE MALNUTRITION PRESENT:  [ ] Yes  [ ] No  Albumin, Serum:     [ ] PPSV2 < or = to 30%   [ ] significant weight loss    [ ] poor nutritional intake  [ ] catabolic state   [ ] anasarca       [ ] Artificial Nutrition    PEx:  T(C): 36.5 (01-20-22 @ 11:57), Max: 37.2 (01-19-22 @ 21:48)  HR: 67 (01-20-22 @ 11:57) (67 - 79)  BP: 122/79 (01-20-22 @ 11:57) (115/80 - 136/75)  RR: 19 (01-20-22 @ 11:57) (18 - 19)  SpO2: 94% (01-20-22 @ 11:57) (92% - 97%)  Wt(kg): --    GEN: NAD  HEENT: atraumatic, no temporal wasting, MMM  RESP: Regular rhythm, no accessory muscle use, CTAB, diminished bilat bases  CARD: No tachycardia, regular rhythm, s1/s2  GI: soft, non distended, non tender with light palpation, normoactive BSx4  EXTR: No cyanosis, No edema  NEURO: Alert, oriented x3  PSYCH:     Preadmit Karnofsky:  %             Current Karnofsky:     %    BMI:  Wt:  Cachexia (Y/N):     PALLIATIVE MEDICINE COORDINATION OF CARE DOCUMENTATION: [x] Inpatient Consult  Non-Face-to-Face prolonged service provided that relates to (face-to-face) care that has or will occur and ongoing patient management, including one or more of the following: - Reviewed documentation from other physicians and other health care professional services - Reviewed medical records and diagnostic / radiology study results - Coordination with patient's support system  ************************************************************************  MEDICATION REVIEW:  - See Medication List Above    ISTOP REFERENCE:   - no active Rxs   - PRN usage: NO PRN'S  ------------------------------------------------------------------------  COORDINATION OF CARE:  - Palliative Care consulted for: GOC / Symptom Management  - Patient (to be) assessed:  - Patient previously seen by Palliative Care service: NO    ADVANCE CARE PLANNING  - Code status: FULL  - MOLST reviewed in chart: NONE; None found on Alpha  - HCP/ Surrogate: NONE found on Alpha  - GOC documents: NONE found on Alpha  - HCP/ Living will/Other Advanced Directives in Alpha: NONE found on Alpha  ------------------------------------------------------------------------  CARE PROVIDER DOCUMENTATION:  - MIMI/TAYO notes: Remains medically active  -   -     PLAN OF CARE  - Known admissions to [facility] in past year:  - Current admit date:  - LOS:  - LACE score:   - Current dispo plan: TO BE DETERMINED    01-12-22 (8d)  ------------------------------------------------------------------------  - Time Spent/Chart reviewed: 31 Minutes [including time used to gather, review and transfer data]  - Start: 1550 1/20  - End: 1620 1/20    Prolonged services rendered, as part of this patient's care provided by Palliative Medicine, include: i. chart review for provider and ancillary service documentation, ii. pertinent diagnostics including laboratory and imaging studies, iii. medication review including PRN use, iv. admission history including previous palliative care encounters and GOC notes, v. advance care planning documents including HCP and MOLST forms in Alpha. Part of Palliative Medicine extended evaluation and management also involves coordination of care with our IDT, the primary and consulting teams, and unit CM/SW and Hospice if eligible. Recommendations based on the information gathered and discussed are outlined in the A/P of Palliative notes. HPI:  89 yo F with PMH DM, hypothyroidism, HLD, and s/p Pfizer x2 who presented 1/12 [facility], decreased PO intake, and hypoxia x3d. Tested positive for COVID 1/2/22 and initial pulse ox readings were in the mid 90s.     Per chart review, over the course of the last 2d pt went to mid 80s and then 70s on day of presentation. Had been working with PCP Dr. Sarabia to get home O2 but they were unable to obtain. Of note, pt returned from a trip to [place] 12/19/21. Lives with daughter's family who initially tested negative when she was positive, and then later they too tested positive. Pt with mild dry cough. Denies HA, changes to vision, CP, abd pain, changes to BMs or urination, pain in extremities. At baseline is fully independent. No other concerns or complaints.     On arrival, T 98.1, , /76, RR 24 O2sat 81% RA -- 99% NRB -- 97% 4L NC. EKG with ST no ischemic changes, QTc 535. Labs with WBC 12.77, 82.1% neutrophils, K 3.6, glu 199, d-dimer 598, albumin 2.6, ferritin and procal pending. COVID+. CXR with BL infiltrates L>R. Pt given decadron 6mg. Admitted to [facility] for further observation and management. (12 Jan 2022 02:29)    Palliative care consulted for GOC for geriatric patient with Covid.  Pt seen and examined. Feels weak, intermittently with cough. Wants to speak to her daughter on the phone.   Reached out to pts daughter, Micaela, for collateral and emotional support. Prior to admission, at baseline pt was ambulatory, fed self, tolerated broth and soft foods best, continent of both B and B. She was consistently alert and made needs known. Explored overall goc and code status. Micaela is clear that further discussing code status is not       PRESENT SYMPTOMS:   [ ]Unable to obtain due to poor mentation   Source if other than patient:  [ ]Family   [ ]Team     Pain:  Location :        Quality:  Radiation:  Timing:  Aggravating factors:  Minimal acceptable level (0-10 scale):   Severity in last 24h (0-10 scale) :  Current score (0-10 scale):  Improves with:     PAIN AD Score:   [email or web address] (press ctrl +  left click to view)    If [ ], pt denies symptom.   Dyspnea:         [ ]Mild [ ]Moderate [ ]Severe  Anxiety:           [ ]Mild [ ]Moderate [ ]Severe  Fatigue:           [ ]Mild [ ]Moderate [ ]Severe  Nausea:           [ ]Mild [ ]Moderate [ ]Severe  Loss of appetite:     [ ]Mild [ ]Moderate [ ]Severe  Constipation:   [ ]Mild [ ]Moderate [ ]Severe  LBM   Grief Present   [ ] Yes   [ ] No   Other Symptoms:    [ ]All other review of systems negative     Opiate Naive (Y/N):   iStop reviewed (Y/N): Yes.   No Rx found on iStop review (Ref#:           PAST MEDICAL & SURGICAL HISTORY:  DM (diabetes mellitus)    HLD (hyperlipidemia)    Hypothyroid    No significant past surgical history        FAMILY HISTORY:  No pertinent family history in first degree relatives     Reviewed and found non contributory in mother or father    SOCIAL HISTORY:   - Support system: [ ] strong [ ] adequate [ ] inadequate    PSYCHOSOCIAL ASSESSMENT:  - Orthodoxy/Spiritual practice:  - Role of organized Holiness [ ] important [ ] some [ ] unable to assess dt pt mentation     - Coping: [ ] well [ ] with difficulty [ ] poor coping [ ] unable to assess dt pt mentation  - What is most important to patient?  - What worries patient the most?  - Is patient at peace? :     Allergies    No Known Allergies    Intolerances        Medications:      MEDICATIONS  (STANDING):  artificial tears (preservative free) Ophthalmic Solution 1 Drop(s) Both EYES two times a day  dexAMETHasone     Tablet 6 milliGRAM(s) Oral every 24 hours  dextrose 40% Gel 15 Gram(s) Oral once  dextrose 5%. 1000 milliLiter(s) (50 mL/Hr) IV Continuous <Continuous>  dextrose 5%. 1000 milliLiter(s) (100 mL/Hr) IV Continuous <Continuous>  dextrose 50% Injectable 25 Gram(s) IV Push once  dextrose 50% Injectable 12.5 Gram(s) IV Push once  dextrose 50% Injectable 25 Gram(s) IV Push once  enoxaparin Injectable 40 milliGRAM(s) SubCutaneous every 24 hours  glucagon  Injectable 1 milliGRAM(s) IntraMuscular once  insulin glargine Injectable (LANTUS) 13 Unit(s) SubCutaneous at bedtime  insulin lispro (ADMELOG) corrective regimen sliding scale   SubCutaneous Before meals and at bedtime  insulin lispro Injectable (ADMELOG) 6 Unit(s) SubCutaneous three times a day before meals  levothyroxine 75 MICROGram(s) Oral daily  melatonin 3 milliGRAM(s) Oral at bedtime  multivitamin  Chewable 1 Tablet(s) Oral daily  pantoprazole    Tablet 40 milliGRAM(s) Oral before breakfast  polyethylene glycol 3350 17 Gram(s) Oral every 12 hours  senna 2 Tablet(s) Oral at bedtime    MEDICATIONS  (PRN):  acetaminophen     Tablet .. 650 milliGRAM(s) Oral every 6 hours PRN Temp greater or equal to 38C (100.4F), Mild Pain (1 - 3)  guaiFENesin Oral Liquid (Sugar-Free) 100 milliGRAM(s) Oral every 6 hours PRN Cough      Labs:    CBC:                        12.4   13.08 )-----------( 357      ( 20 Jan 2022 08:01 )             40.4     CMP:    01-20    138  |  101  |  19  ----------------------------<  116<H>  4.3   |  28  |  0.79    Ca    9.0      20 Jan 2022 08:01  Phos  3.9     01-20  Mg     2.2     01-20    TPro  6.3  /  Alb  2.9<L>  /  TBili  0.4  /  DBili  x   /  AST  33  /  ALT  55<H>  /  AlkPhos  82  01-20           RADIOLOGY & ADDITIONAL STUDIES:  Imaging:  Reviewed    PROTEIN CALORIE MALNUTRITION PRESENT:  [ ] Yes  [ ] No  Albumin, Serum:     [ ] PPSV2 < or = to 30%   [ ] significant weight loss    [ ] poor nutritional intake  [ ] catabolic state   [ ] anasarca       [ ] Artificial Nutrition    PEx:  T(C): 36.5 (01-20-22 @ 11:57), Max: 37.2 (01-19-22 @ 21:48)  HR: 67 (01-20-22 @ 11:57) (67 - 79)  BP: 122/79 (01-20-22 @ 11:57) (115/80 - 136/75)  RR: 19 (01-20-22 @ 11:57) (18 - 19)  SpO2: 94% (01-20-22 @ 11:57) (92% - 97%)  Wt(kg): --    GEN: NAD  HEENT: atraumatic, no temporal wasting, MMM  RESP: Regular rhythm, no accessory muscle use, CTAB, diminished bilat bases  CARD: No tachycardia, regular rhythm, s1/s2  GI: soft, non distended, non tender with light palpation, normoactive BSx4  EXTR: No cyanosis, No edema  NEURO: Alert, oriented x3  PSYCH:     Preadmit Karnofsky:  %             Current Karnofsky:     %    BMI:  Wt:  Cachexia (Y/N):     PALLIATIVE MEDICINE COORDINATION OF CARE DOCUMENTATION: [x] Inpatient Consult  Non-Face-to-Face prolonged service provided that relates to (face-to-face) care that has or will occur and ongoing patient management, including one or more of the following: - Reviewed documentation from other physicians and other health care professional services - Reviewed medical records and diagnostic / radiology study results - Coordination with patient's support system  ************************************************************************  MEDICATION REVIEW:  - See Medication List Above    ISTOP REFERENCE:   - no active Rxs   - PRN usage: NO PRN'S  ------------------------------------------------------------------------  COORDINATION OF CARE:  - Palliative Care consulted for: GOC / Symptom Management  - Patient (to be) assessed:  - Patient previously seen by Palliative Care service: NO    ADVANCE CARE PLANNING  - Code status: FULL  - MOLST reviewed in chart: NONE; None found on Alpha  - HCP/ Surrogate: NONE found on Alpha  - GOC documents: NONE found on Alpha  - HCP/ Living will/Other Advanced Directives in Alpha: NONE found on Alpha  ------------------------------------------------------------------------  CARE PROVIDER DOCUMENTATION:  - SW/CM notes: Remains medically active  -   -     PLAN OF CARE  - Known admissions to [facility] in past year:  - Current admit date:  - LOS:  - LACE score:   - Current dispo plan: TO BE DETERMINED    01-12-22 (8d)  ------------------------------------------------------------------------  - Time Spent/Chart reviewed: 31 Minutes [including time used to gather, review and transfer data]  - Start: 1550 1/20  - End: 1620 1/20    Prolonged services rendered, as part of this patient's care provided by Palliative Medicine, include: i. chart review for provider and ancillary service documentation, ii. pertinent diagnostics including laboratory and imaging studies, iii. medication review including PRN use, iv. admission history including previous palliative care encounters and GOC notes, v. advance care planning documents including HCP and MOLST forms in Alpha. Part of Palliative Medicine extended evaluation and management also involves coordination of care with our IDT, the primary and consulting teams, and unit CM/SW and Hospice if eligible. Recommendations based on the information gathered and discussed are outlined in the A/P of Palliative notes. HPI:  89 yo F with PMH DM, hypothyroidism, HLD, and s/p Pfizer x2 who presented 1/12 [facility], decreased PO intake, and hypoxia x3d. Tested positive for COVID 1/2/22 and initial pulse ox readings were in the mid 90s.     Per chart review, over the course of the last 2d pt went to mid 80s and then 70s on day of presentation. Had been working with PCP Dr. Sarabia to get home O2 but they were unable to obtain. Of note, pt returned from a trip to [place] 12/19/21. Lives with daughter's family who initially tested negative when she was positive, and then later they too tested positive. Pt with mild dry cough. Denies HA, changes to vision, CP, abd pain, changes to BMs or urination, pain in extremities. At baseline is fully independent. No other concerns or complaints.     On arrival, T 98.1, , /76, RR 24 O2sat 81% RA -- 99% NRB -- 97% 4L NC. EKG with ST no ischemic changes, QTc 535. Labs with WBC 12.77, 82.1% neutrophils, K 3.6, glu 199, d-dimer 598, albumin 2.6, ferritin and procal pending. COVID+. CXR with BL infiltrates L>R. Pt given decadron 6mg. Admitted to [facility] for further observation and management. (12 Jan 2022 02:29)    Palliative care consulted for [facility] for geriatric patient with Covid.  Pt seen and examined. Feels weak, intermittently with cough. Appears comfortable, is conversant on 7L NC. Wants to speak to her daughter.   Reached out to pts daughter, Micaela, for collateral and emotional support. Prior to admission, at baseline pt was ambulatory, fed self, tolerated broth and soft foods best, continent of BB. She is consistently alert and makes needs known. Explored goc. Micaela wants to take her mother home with home PT. Continue aggressive interventions, Full code; Micaela clear that she does not want to discuss further. All questions answered, emotional support provided.       PRESENT SYMPTOMS:   [ ]Unable to obtain due to poor mentation   Source if other than patient:  [ ]Family   [ ]Team     Pain:  Location :      denies     If [ ], pt denies symptom.   Dyspnea:         [x ]Mild [ ]Moderate [ ]Severe  Anxiety:           [ ]Mild [ ]Moderate [ ]Severe  Fatigue:           [ ]Mild [x ]Moderate [ ]Severe  Nausea:           [ ]Mild [ ]Moderate [ ]Severe  Loss of appetite:     [x ]Mild [ ]Moderate [ ]Severe  Constipation:   [ ]Mild [ ]Moderate [ ]Severe  Grief Present   [ ] Yes   [x ] No   Other Symptoms: + cough     [ x]All other review of systems negative     Opiate Naive (Y/N): Yes  iStop reviewed (Y/N): Yes.   Remote percocet Rx found on iStop review (Ref#:       [id number]    PAST MEDICAL & SURGICAL HISTORY:  DM (diabetes mellitus)  HLD (hyperlipidemia)  Hypothyroid  No significant past surgical history    FAMILY HISTORY:  No pertinent family history in first degree relatives    SOCIAL HISTORY:   - Support system: [ ] strong [x ] adequate [ ] inadequate  - Latvian speaking  - several adult children, heavily involved in pts care   - lives with daughter     PSYCHOSOCIAL ASSESSMENT:  - Coping: [x ] well [ ] with difficulty [ ] poor coping [ ] unable to assess dt pt mentation    Allergies  No Known Allergies  Intolerances    Medications:      MEDICATIONS  (STANDING):  artificial tears (preservative free) Ophthalmic Solution 1 Drop(s) Both EYES two times a day  dexAMETHasone     Tablet 6 milliGRAM(s) Oral every 24 hours  dextrose 40% Gel 15 Gram(s) Oral once  dextrose 5%. 1000 milliLiter(s) (50 mL/Hr) IV Continuous <Continuous>  dextrose 5%. 1000 milliLiter(s) (100 mL/Hr) IV Continuous <Continuous>  dextrose 50% Injectable 25 Gram(s) IV Push once  dextrose 50% Injectable 12.5 Gram(s) IV Push once  dextrose 50% Injectable 25 Gram(s) IV Push once  enoxaparin Injectable 40 milliGRAM(s) SubCutaneous every 24 hours  glucagon  Injectable 1 milliGRAM(s) IntraMuscular once  insulin glargine Injectable (LANTUS) 13 Unit(s) SubCutaneous at bedtime  insulin lispro (ADMELOG) corrective regimen sliding scale   SubCutaneous Before meals and at bedtime  insulin lispro Injectable (ADMELOG) 6 Unit(s) SubCutaneous three times a day before meals  levothyroxine 75 MICROGram(s) Oral daily  melatonin 3 milliGRAM(s) Oral at bedtime  multivitamin  Chewable 1 Tablet(s) Oral daily  pantoprazole    Tablet 40 milliGRAM(s) Oral before breakfast  polyethylene glycol 3350 17 Gram(s) Oral every 12 hours  senna 2 Tablet(s) Oral at bedtime    MEDICATIONS  (PRN):  acetaminophen     Tablet .. 650 milliGRAM(s) Oral every 6 hours PRN Temp greater or equal to 38C (100.4F), Mild Pain (1 - 3)  guaiFENesin Oral Liquid (Sugar-Free) 100 milliGRAM(s) Oral every 6 hours PRN Cough      Labs:    CBC:                        12.4   13.08 )-----------( 357      ( 20 Jan 2022 08:01 )             40.4     CMP:    01-20    138  |  101  |  19  ----------------------------<  116<H>  4.3   |  28  |  0.79    Ca    9.0      20 Jan 2022 08:01  Phos  3.9     01-20  Mg     2.2     01-20    TPro  6.3  /  Alb  2.9<L>  /  TBili  0.4  /  DBili  x   /  AST  33  /  ALT  55<H>  /  AlkPhos  82  01-20       RADIOLOGY & ADDITIONAL STUDIES:  Imaging:  Reviewed    < from: Xray Chest 1 View AP/PA (01.11.22 @ 21:24) >  IMPRESSION: Bilateral lower lobe infiltrates.    < from: Xray Chest 1 View- PORTABLE-Routine (Xray Chest 1 View- PORTABLE-Routine in AM.) (01.15.22 @ 06:09) >  IMPRESSION: Progression lung infiltrates. Improvement left effusion    < from: Xray Chest 1 View- PORTABLE-Routine (Xray Chest 1 View- PORTABLE-Routine in AM.) (01.18.22 @ 05:33) >  IMPRESSION: Worsening lung opacities, new in the left upper and mid lung   fields.    < from: Xray Chest 1 View- PORTABLE-Urgent (Xray Chest 1 View- PORTABLE-Urgent .) (01.19.22 @ 11:56) >  IMPRESSION:  Scattered increased lung markings similar to previous exam 1/18/2022. No   acute infiltrate appearing. No pleural effusion or pneumothorax. Vascular   calcification thoracic aorta.    PROTEIN CALORIE MALNUTRITION PRESENT:  [ ] Yes  [ x] No  Albumin, Serum: 2.9 (1/20)    [ ] PPSV2 < or = to 30%   [ ] significant weight loss    [ ] poor nutritional intake  [ ] catabolic state   [ ] anasarca       [ ] Artificial Nutrition    PEx:  T(C): 36.5 (01-20-22 @ 11:57), Max: 37.2 (01-19-22 @ 21:48)  HR: 67 (01-20-22 @ 11:57) (67 - 79)  BP: 122/79 (01-20-22 @ 11:57) (115/80 - 136/75)  RR: 19 (01-20-22 @ 11:57) (18 - 19)  SpO2: 94% (01-20-22 @ 11:57) (92% - 97%)  Wt(kg): --    GEN: Elderly woman sitting up in bed NAD  HEENT: atraumatic, no temporal wasting, MMM, NC  RESP: Regular rhythm, no accessory muscle use, diffuse rhonchi, diminished bilat bases  CARD: No tachycardia, regular rhythm, s1/s2  GI: soft, non distended, non tender with light palpation, normoactive BSx4  EXTR: No cyanosis, No edema  NEURO: Alert, oriented x3  PSYCH: calm, pleasant     Preadmit Karnofsky:  70%             Current Karnofsky:    40 %    BMI:   Wt: 54  Cachexia (Y/N): N    PALLIATIVE MEDICINE COORDINATION OF CARE DOCUMENTATION: [x] Inpatient Consult  Non-Face-to-Face prolonged service provided that relates to (face-to-face) care that has or will occur and ongoing patient management, including one or more of the following: - Reviewed documentation from other physicians and other health care professional services - Reviewed medical records and diagnostic / radiology study results - Coordination with patient's support system  ************************************************************************  MEDICATION REVIEW:  - See Medication List Above    ISTOP REFERENCE:   - no active Rxs   - PRN usage: NO PRN'S  ------------------------------------------------------------------------  COORDINATION OF CARE:  - Palliative Care consulted for: GOC   - Patient (to be) assessed: 1/18/22  - Patient previously seen by Palliative Care service: NO    ADVANCE CARE PLANNING  - Code status: FULL  - MOLST reviewed in chart: NONE; None found on Alpha  - HCP/ Surrogate: daughter, Micaela, is primary contact   - GOC documents: NONE found on Alpha  - HCP/ Living will/Other Advanced Directives in Alpha: NONE found on Alpha  ------------------------------------------------------------------------  CARE PROVIDER DOCUMENTATION:  - SW/CM notes: Remains medically active  - Med: Covid mgmt, sepsis improving, but has been difficult to wean pt down from 6L  - PT: rec DOMENICA     PLAN OF CARE  - Known admissions to [facility] in past year: current   - Current admit date: 1/12/22  - LOS: 5 days  - LACE score:  4  - Current dispo plan: TO BE DETERMINED    01-12-22 (8d)  ------------------------------------------------------------------------  - Time Spent/Chart reviewed: 31 Minutes [including time used to gather, review and transfer data]  - Start: 1550 1/20  - End: 1620 1/20    Prolonged services rendered, as part of this patient's care provided by Palliative Medicine, include: i. chart review for provider and ancillary service documentation, ii. pertinent diagnostics including laboratory and imaging studies, iii. medication review including PRN use, iv. admission history including previous palliative care encounters and GO notes, v. advance care planning documents including HCP and MOLST forms in Alpha. Part of Palliative Medicine extended evaluation and management also involves coordination of care with our IDT, the primary and consulting teams, and unit CM/SW and Hospice if eligible. Recommendations based on the information gathered and discussed are outlined in the A/P of Palliative notes.

## 2022-01-18 NOTE — PROGRESS NOTE ADULT - PROBLEM SELECTOR PLAN 1
- continue to trend inflammatory markers  - c/w remdesivir x5d (end 1/16)  - c/w decadron 6mg x10d (end 1/20)  - try to wean daily, she has remained difficult to wean off of 6L NC. She is sating well this AM on the 6L with no complaints.   - desaturates on sitting at the end of the bed  - will likely need home O2 on discharge  - only complains of fatigue/weakness  - PT recs DOMENICA but family wants home with home PT

## 2022-01-18 NOTE — PROGRESS NOTE ADULT - SUBJECTIVE AND OBJECTIVE BOX
Physical Medicine and Rehabilitation Progress Note:    Patient is a 89y old  Female who presents with a chief complaint of COVID PNA (17 Jan 2022 07:01)      HPI:  Pt is a 89 yo F with PMH DM, hypothyroidism, HLD, and s/p Pfizer x2 who p/w ERICKSON, decreased PO intake, and hypoxia x3d. Tested positive for COVID 1/2/22 and initial pulse ox readings were in the mid 90s. Over the course of the last 2d pt went to mid 80s and then 70s on day of presentation. Had been working with PCP Dr. Sarabia to get home O2 but they were unable to obtain. Of note, pt returned from a trip to [place] 12/19/21. Lives with daughter's family who initially tested negative when she was positive, and then later they too tested positive. Pt with mild dry cough. Denies HA, changes to vision, CP, abd pain, changes to BMs or urination, pain in extremities. At baseline is fully independent. No other concerns or complaints.     On arrival, T 98.1, , /76, RR 24 O2sat 81% RA -- 99% NRB -- 97% 4L NC. EKG with ST no ischemic changes, QTc 535. Labs with WBC 12.77, 82.1% neutrophils, K 3.6, glu 199, d-dimer 598, albumin 2.6, ferritin and procal pending. COVID+. CXR with BL infiltrates L>R. Pt given decadron 6mg. Admitted to [facility] for further observation and management. (12 Jan 2022 02:29)                            13.5   14.49 )-----------( 310      ( 18 Jan 2022 07:16 )             42.3       01-18    139  |  102  |  19  ----------------------------<  92  4.3   |  25  |  0.76    Ca    8.6      18 Jan 2022 07:16  Phos  3.7     01-18  Mg     2.1     01-18    TPro  6.4  /  Alb  2.9<L>  /  TBili  0.4  /  DBili  x   /  AST  46<H>  /  ALT  71<H>  /  AlkPhos  90  01-18    Vital Signs Last 24 Hrs  T(C): 36.7 (18 Jan 2022 06:36), Max: 37.1 (17 Jan 2022 21:08)  T(F): 98 (18 Jan 2022 06:36), Max: 98.8 (17 Jan 2022 21:08)  HR: 76 (18 Jan 2022 09:20) (64 - 76)  BP: 108/75 (18 Jan 2022 06:36) (105/62 - 108/75)  BP(mean): --  RR: 18 (18 Jan 2022 06:36) (18 - 18)  SpO2: 92% (18 Jan 2022 09:20) (92% - 95%)    MEDICATIONS  (STANDING):  artificial tears (preservative free) Ophthalmic Solution 1 Drop(s) Both EYES two times a day  dexAMETHasone     Tablet 6 milliGRAM(s) Oral every 24 hours  dextrose 40% Gel 15 Gram(s) Oral once  dextrose 5%. 1000 milliLiter(s) (50 mL/Hr) IV Continuous <Continuous>  dextrose 5%. 1000 milliLiter(s) (100 mL/Hr) IV Continuous <Continuous>  dextrose 50% Injectable 25 Gram(s) IV Push once  dextrose 50% Injectable 12.5 Gram(s) IV Push once  dextrose 50% Injectable 25 Gram(s) IV Push once  enoxaparin Injectable 40 milliGRAM(s) SubCutaneous every 24 hours  glucagon  Injectable 1 milliGRAM(s) IntraMuscular once  insulin glargine Injectable (LANTUS) 13 Unit(s) SubCutaneous at bedtime  insulin lispro (ADMELOG) corrective regimen sliding scale   SubCutaneous Before meals and at bedtime  insulin lispro Injectable (ADMELOG) 4 Unit(s) SubCutaneous three times a day before meals  levothyroxine 75 MICROGram(s) Oral daily  melatonin 3 milliGRAM(s) Oral at bedtime  multivitamin  Chewable 1 Tablet(s) Oral daily  pantoprazole    Tablet 40 milliGRAM(s) Oral before breakfast  potassium chloride    Tablet ER 20 milliEquivalent(s) Oral once    MEDICATIONS  (PRN):  acetaminophen     Tablet .. 650 milliGRAM(s) Oral every 6 hours PRN Temp greater or equal to 38C (100.4F), Mild Pain (1 - 3)  guaiFENesin Oral Liquid (Sugar-Free) 100 milliGRAM(s) Oral every 6 hours PRN Cough    Currently Undergoing Physical/ Occupational Therapy at bedside.    Functional Status Assessment:   1/17/2022      Therapeutic Interventions      Bed Mobility  Bed Mobility Training Rolling/Turning: moderate assist (50% patient effort);  1 person assist;  bed rails  Bed Mobility Training Scooting: maximum assist (25% patient effort);  2 person assist  Bed Mobility Training Sit-to-Supine: maximum assist (25% patient effort);  2 person assist  Bed Mobility Training Supine-to-Sit: maximum assist (25% patient effort);  1 person assist;  bed rails  Bed Mobility Training Limitations: decreased ability to use legs for bridging/pushing;  impaired ability to control trunk for mobility;  decreased flexibility;  decreased strength;  impaired balance;  impaired postural control    Sit-Stand Transfer Training  Transfer Training Sit-to-Stand Transfer: maximum assist (25% patient effort);  1 person assist;  rolling walker;  Verbal cues for safe hand placement and sequencing; pt had difficulty standing fully upright - pt could not specify why other than general weakness.  Transfer Training Stand-to-Sit Transfer: maximum assist (25% patient effort);  1 person assist;  rolling walker  Sit-to-Stand Transfer Training Transfer Safety Analysis: decreased balance;  decreased sequencing ability;  decreased weight-shifting ability;  decreased flexibility;  decreased strength;  impaired balance;  impaired postural control    Therapeutic Exercise  Therapeutic Exercise Detail: Ankle pumps, 2x10 each LE, to improve ankle DF ROM for midstance phase of gait and help prevent DVTs; Heel slides with assist, 1x5 each LE, to improve knee ROM for transfers;quad sets, 1x10, each LE to improve terminal knee ext strength and ROM to improve knee ext during midstance phase of gait;Supine bridges, 1x5, to improve glute strength for transfers and stance phase of gait;           PM&R Impression: as above    Current Disposition Plan Recommendations:    subacute rehab placement

## 2022-01-18 NOTE — CONSULT NOTE ADULT - PROBLEM SELECTOR RECOMMENDATION 9
.  infection, hospital course, advanced age. appetite ok, but doesn't like the food. pps 40/50%  - assist with ADLs, encourage PO, encourage movement  - PT recommending HPT 2-3 times per week   - cw PT/OT

## 2022-01-18 NOTE — PROGRESS NOTE ADULT - SUBJECTIVE AND OBJECTIVE BOX
CC: Patient is a 89y old  Female who presents with a chief complaint of COVID PNA (18 Jan 2022 14:14)      INTERVAL EVENTS: MARJAN    SUBJECTIVE / INTERVAL HPI: Patient seen and examined at bedside.     ROS: negative unless otherwise stated above.    VITAL SIGNS:  Vital Signs Last 24 Hrs  T(C): 36.9 (18 Jan 2022 16:15), Max: 37.1 (17 Jan 2022 21:08)  T(F): 98.5 (18 Jan 2022 16:15), Max: 98.8 (17 Jan 2022 21:08)  HR: 77 (18 Jan 2022 16:15) (64 - 77)  BP: 118/74 (18 Jan 2022 16:15) (105/62 - 118/74)  BP(mean): 89 (18 Jan 2022 16:15) (89 - 89)  RR: 17 (18 Jan 2022 16:15) (17 - 18)  SpO2: 93% (18 Jan 2022 16:15) (92% - 95%)      01-17-22 @ 07:01  -  01-18-22 @ 07:00  --------------------------------------------------------  IN: 270 mL / OUT: 1100 mL / NET: -830 mL        PHYSICAL EXAM:    General: NAD  HEENT: MMM  Neck: supple  Cardiovascular: +S1/S2; RRR  Respiratory: rhonchi throughout, still on 6L comfortably  Gastrointestinal: soft, NT/ND  Extremities: WWP; no edema, clubbing or cyanosis  Vascular: 2+ radial, DP/PT pulses B/L  Neurological: AAOx3; no focal deficits, fatigued    MEDICATIONS:  MEDICATIONS  (STANDING):  artificial tears (preservative free) Ophthalmic Solution 1 Drop(s) Both EYES two times a day  dexAMETHasone     Tablet 6 milliGRAM(s) Oral every 24 hours  dextrose 40% Gel 15 Gram(s) Oral once  dextrose 5%. 1000 milliLiter(s) (50 mL/Hr) IV Continuous <Continuous>  dextrose 5%. 1000 milliLiter(s) (100 mL/Hr) IV Continuous <Continuous>  dextrose 50% Injectable 25 Gram(s) IV Push once  dextrose 50% Injectable 12.5 Gram(s) IV Push once  dextrose 50% Injectable 25 Gram(s) IV Push once  enoxaparin Injectable 40 milliGRAM(s) SubCutaneous every 24 hours  glucagon  Injectable 1 milliGRAM(s) IntraMuscular once  insulin glargine Injectable (LANTUS) 13 Unit(s) SubCutaneous at bedtime  insulin lispro (ADMELOG) corrective regimen sliding scale   SubCutaneous Before meals and at bedtime  insulin lispro Injectable (ADMELOG) 4 Unit(s) SubCutaneous three times a day before meals  levothyroxine 75 MICROGram(s) Oral daily  melatonin 3 milliGRAM(s) Oral at bedtime  multivitamin  Chewable 1 Tablet(s) Oral daily  pantoprazole    Tablet 40 milliGRAM(s) Oral before breakfast  potassium chloride    Tablet ER 20 milliEquivalent(s) Oral once    MEDICATIONS  (PRN):  acetaminophen     Tablet .. 650 milliGRAM(s) Oral every 6 hours PRN Temp greater or equal to 38C (100.4F), Mild Pain (1 - 3)  guaiFENesin Oral Liquid (Sugar-Free) 100 milliGRAM(s) Oral every 6 hours PRN Cough      ALLERGIES:  Allergies    No Known Allergies    Intolerances        LABS:                        13.5   14.49 )-----------( 310      ( 18 Jan 2022 07:16 )             42.3     01-18    139  |  102  |  19  ----------------------------<  92  4.3   |  25  |  0.76    Ca    8.6      18 Jan 2022 07:16  Phos  3.7     01-18  Mg     2.1     01-18    TPro  6.4  /  Alb  2.9<L>  /  TBili  0.4  /  DBili  x   /  AST  46<H>  /  ALT  71<H>  /  AlkPhos  90  01-18        CAPILLARY BLOOD GLUCOSE      POCT Blood Glucose.: 208 mg/dL (18 Jan 2022 12:18)      RADIOLOGY & ADDITIONAL TESTS: Reviewed.

## 2022-01-18 NOTE — CONSULT NOTE ADULT - PROBLEM SELECTOR RECOMMENDATION 5
.  - Coping: [x ] well [ ] with difficulty [ ] poor coping   - Support system: [ ] strong [x ] adequate [ ] inadequate  - All questions answered, emotional support provided  - dw primary team   - Please contact Palliative Medicine 24/7 at [phone] for any acute symptoms or questions

## 2022-01-18 NOTE — CONSULT NOTE ADULT - PROBLEM SELECTOR RECOMMENDATION 3
.  s/p Pfizer x2 who p/w ERICKSON, decreased PO intake, and hypoxia x3d. tolerating 5LNC, improved from 6-7 L  - positive COVID 1/2/22   - appreciate primary team mgmt

## 2022-01-19 LAB
ALBUMIN SERPL ELPH-MCNC: 2.6 G/DL — LOW (ref 3.3–5)
ALP SERPL-CCNC: 81 U/L — SIGNIFICANT CHANGE UP (ref 40–120)
ALT FLD-CCNC: 58 U/L — HIGH (ref 10–45)
ANION GAP SERPL CALC-SCNC: 8 MMOL/L — SIGNIFICANT CHANGE UP (ref 5–17)
AST SERPL-CCNC: 36 U/L — SIGNIFICANT CHANGE UP (ref 10–40)
BASOPHILS # BLD AUTO: 0.04 K/UL — SIGNIFICANT CHANGE UP (ref 0–0.2)
BASOPHILS NFR BLD AUTO: 0.3 % — SIGNIFICANT CHANGE UP (ref 0–2)
BILIRUB SERPL-MCNC: 0.4 MG/DL — SIGNIFICANT CHANGE UP (ref 0.2–1.2)
BUN SERPL-MCNC: 22 MG/DL — SIGNIFICANT CHANGE UP (ref 7–23)
CALCIUM SERPL-MCNC: 8.5 MG/DL — SIGNIFICANT CHANGE UP (ref 8.4–10.5)
CHLORIDE SERPL-SCNC: 102 MMOL/L — SIGNIFICANT CHANGE UP (ref 96–108)
CO2 SERPL-SCNC: 28 MMOL/L — SIGNIFICANT CHANGE UP (ref 22–31)
CREAT SERPL-MCNC: 0.87 MG/DL — SIGNIFICANT CHANGE UP (ref 0.5–1.3)
CRP SERPL-MCNC: 14.6 MG/L — HIGH (ref 0–4)
D DIMER BLD IA.RAPID-MCNC: 478 NG/ML DDU — HIGH
EOSINOPHIL # BLD AUTO: 0 K/UL — SIGNIFICANT CHANGE UP (ref 0–0.5)
EOSINOPHIL NFR BLD AUTO: 0 % — SIGNIFICANT CHANGE UP (ref 0–6)
ERYTHROCYTE [SEDIMENTATION RATE] IN BLOOD: 71 MM/HR — HIGH
FERRITIN SERPL-MCNC: 644 NG/ML — HIGH (ref 15–150)
GLUCOSE BLDC GLUCOMTR-MCNC: 151 MG/DL — HIGH (ref 70–99)
GLUCOSE BLDC GLUCOMTR-MCNC: 217 MG/DL — HIGH (ref 70–99)
GLUCOSE BLDC GLUCOMTR-MCNC: 242 MG/DL — HIGH (ref 70–99)
GLUCOSE BLDC GLUCOMTR-MCNC: 260 MG/DL — HIGH (ref 70–99)
GLUCOSE SERPL-MCNC: 146 MG/DL — HIGH (ref 70–99)
HCT VFR BLD CALC: 38.8 % — SIGNIFICANT CHANGE UP (ref 34.5–45)
HGB BLD-MCNC: 12.2 G/DL — SIGNIFICANT CHANGE UP (ref 11.5–15.5)
IMM GRANULOCYTES NFR BLD AUTO: 2.8 % — HIGH (ref 0–1.5)
LDH SERPL L TO P-CCNC: 183 U/L — SIGNIFICANT CHANGE UP (ref 50–242)
LYMPHOCYTES # BLD AUTO: 17.3 % — SIGNIFICANT CHANGE UP (ref 13–44)
LYMPHOCYTES # BLD AUTO: 2.35 K/UL — SIGNIFICANT CHANGE UP (ref 1–3.3)
MAGNESIUM SERPL-MCNC: 2.1 MG/DL — SIGNIFICANT CHANGE UP (ref 1.6–2.6)
MCHC RBC-ENTMCNC: 29.3 PG — SIGNIFICANT CHANGE UP (ref 27–34)
MCHC RBC-ENTMCNC: 31.4 GM/DL — LOW (ref 32–36)
MCV RBC AUTO: 93.3 FL — SIGNIFICANT CHANGE UP (ref 80–100)
MONOCYTES # BLD AUTO: 0.84 K/UL — SIGNIFICANT CHANGE UP (ref 0–0.9)
MONOCYTES NFR BLD AUTO: 6.2 % — SIGNIFICANT CHANGE UP (ref 2–14)
NEUTROPHILS # BLD AUTO: 9.98 K/UL — HIGH (ref 1.8–7.4)
NEUTROPHILS NFR BLD AUTO: 73.4 % — SIGNIFICANT CHANGE UP (ref 43–77)
NRBC # BLD: 0 /100 WBCS — SIGNIFICANT CHANGE UP (ref 0–0)
PHOSPHATE SERPL-MCNC: 3.8 MG/DL — SIGNIFICANT CHANGE UP (ref 2.5–4.5)
PLATELET # BLD AUTO: 365 K/UL — SIGNIFICANT CHANGE UP (ref 150–400)
POTASSIUM SERPL-MCNC: 4.5 MMOL/L — SIGNIFICANT CHANGE UP (ref 3.5–5.3)
POTASSIUM SERPL-SCNC: 4.5 MMOL/L — SIGNIFICANT CHANGE UP (ref 3.5–5.3)
PROT SERPL-MCNC: 5.9 G/DL — LOW (ref 6–8.3)
RBC # BLD: 4.16 M/UL — SIGNIFICANT CHANGE UP (ref 3.8–5.2)
RBC # FLD: 13.2 % — SIGNIFICANT CHANGE UP (ref 10.3–14.5)
SODIUM SERPL-SCNC: 138 MMOL/L — SIGNIFICANT CHANGE UP (ref 135–145)
WBC # BLD: 13.59 K/UL — HIGH (ref 3.8–10.5)
WBC # FLD AUTO: 13.59 K/UL — HIGH (ref 3.8–10.5)

## 2022-01-19 PROCEDURE — 99232 SBSQ HOSP IP/OBS MODERATE 35: CPT | Mod: GC

## 2022-01-19 PROCEDURE — 99222 1ST HOSP IP/OBS MODERATE 55: CPT

## 2022-01-19 PROCEDURE — 71045 X-RAY EXAM CHEST 1 VIEW: CPT | Mod: 26

## 2022-01-19 RX ORDER — INSULIN LISPRO 100/ML
6 VIAL (ML) SUBCUTANEOUS
Refills: 0 | Status: DISCONTINUED | OUTPATIENT
Start: 2022-01-19 | End: 2022-01-21

## 2022-01-19 RX ADMIN — INSULIN GLARGINE 13 UNIT(S): 100 INJECTION, SOLUTION SUBCUTANEOUS at 22:46

## 2022-01-19 RX ADMIN — PANTOPRAZOLE SODIUM 40 MILLIGRAM(S): 20 TABLET, DELAYED RELEASE ORAL at 06:33

## 2022-01-19 RX ADMIN — Medication 6 MILLIGRAM(S): at 06:33

## 2022-01-19 RX ADMIN — Medication 3: at 13:31

## 2022-01-19 RX ADMIN — Medication 4 UNIT(S): at 10:09

## 2022-01-19 RX ADMIN — Medication 1: at 10:10

## 2022-01-19 RX ADMIN — Medication 4 UNIT(S): at 13:31

## 2022-01-19 RX ADMIN — Medication 2: at 18:18

## 2022-01-19 RX ADMIN — Medication 1 DROP(S): at 18:18

## 2022-01-19 RX ADMIN — ENOXAPARIN SODIUM 40 MILLIGRAM(S): 100 INJECTION SUBCUTANEOUS at 06:32

## 2022-01-19 RX ADMIN — Medication 2: at 22:34

## 2022-01-19 RX ADMIN — Medication 75 MICROGRAM(S): at 06:32

## 2022-01-19 RX ADMIN — Medication 6 UNIT(S): at 18:18

## 2022-01-19 RX ADMIN — Medication 1 TABLET(S): at 11:33

## 2022-01-19 RX ADMIN — Medication 3 MILLIGRAM(S): at 22:11

## 2022-01-19 RX ADMIN — Medication 1 DROP(S): at 06:34

## 2022-01-19 NOTE — PROGRESS NOTE ADULT - PROBLEM SELECTOR PLAN 1
- continue to trend inflammatory markers  - c/w remdesivir x5d (end 1/16)  - c/w decadron 6mg x10d (end 1/20)  - try to wean daily, she has remained difficult to wean off of 6L NC.  - desaturates on sitting at the end of the bed unable to wean  - will likely need home O2 on discharge  - only complains of fatigue/weakness  - PT recs DOMENICA but family wants home with home PT

## 2022-01-19 NOTE — CONSULT NOTE ADULT - SUBJECTIVE AND OBJECTIVE BOX
INCOMPLETE NOTE     Pulmonary Consult    Patient is a 89y old  Female who presents with a chief complaint of COVID PNA (19 Jan 2022 14:14)    HPI:  Pt is a 89 yo F with PMH DM, hypothyroidism, HLD, and s/p Pfizer x2 who p/w ERICKSON, decreased PO intake, and hypoxia x3d. Tested positive for COVID 1/2/22 and initial pulse ox readings were in the mid 90s. Over the course of the last 2d pt went to mid 80s and then 70s on day of presentation. Had been working with PCP Dr. Sarabia to get home O2 but they were unable to obtain. Of note, pt returned from a trip to [place] 12/19/21. Lives with daughter's family who initially tested negative when she was positive, and then later they too tested positive. Pt with mild dry cough. Denies HA, changes to vision, CP, abd pain, changes to BMs or urination, pain in extremities. At baseline is fully independent. No other concerns or complaints.     On arrival, T 98.1, , /76, RR 24 O2sat 81% RA -- 99% NRB -- 97% 4L NC. EKG with ST no ischemic changes, QTc 535. Labs with WBC 12.77, 82.1% neutrophils, K 3.6, glu 199, d-dimer 598, albumin 2.6, ferritin and procal pending. COVID+. CXR with BL infiltrates L>R. Pt given decadron 6mg. Admitted to [facility] for further observation and management.     Patient seen and examined at bedside. She reports feeling better today       PAST MEDICAL & SURGICAL HISTORY:  DM (diabetes mellitus)    HLD (hyperlipidemia)    Hypothyroid    No significant past surgical history      FAMILY HISTORY:  No pertinent family history in first degree relatives      Social History:  lives with daughter's family  fully independent  denies toxic substances (12 Jan 2022 02:29)    Allergies    No Known Allergies    Intolerances      REVIEW OF SYSTEMS:  Constitutional: No fevers or chills or weight loss.   Eyes: No itching or discharge from the eyes  ENT:  No post nasal drip. No epistaxis. No throat pain. . No difficulty swallowing.   CV: No chest pain. No palpitations.  or dizziness.   Resp: No sob or cough or wheezing or hemoptysis or pleuritic chest pain   GI: No nausea. No vomiting. No diarrhea or abdominal pain   MSK: No joint pain or pain in any extremities  Integumentary: No skin lesions. No pedal edema.  Neurological: No gross motor weakness. No sensory changes.    PHYSICAL EXAM:  Vital Signs Last 24 Hrs  T(C): 36.4 (19 Jan 2022 12:27), Max: 36.9 (18 Jan 2022 16:15)  T(F): 97.5 (19 Jan 2022 12:27), Max: 98.5 (18 Jan 2022 16:15)  HR: 78 (19 Jan 2022 12:27) (63 - 78)  BP: 103/63 (19 Jan 2022 12:27) (103/63 - 118/74)  BP(mean): 89 (18 Jan 2022 16:15) (89 - 89)  RR: 18 (19 Jan 2022 12:27) (17 - 18)  SpO2: 95% (19 Jan 2022 12:27) (93% - 96%)  General: Awake, alert, oriented X 3.   HEENT: Atraumatic, normocephalic.   Neck: No JVD no lymphadenopathy   Respiratory: normal vesicular breathing with no wheeze or rales on the exam .  Cardiovascular: S1 S2 normal. No murmurs.   Abdomen: Soft, non-tender, non-distended. No organomegaly.  Extremities: No edema of calf tenderness   Skin: No rashes or skin lesions  Neurological: moving all the extremties with out weakness       HOSPITAL MEDICATIONS:  MEDICATIONS  (STANDING):  artificial tears (preservative free) Ophthalmic Solution 1 Drop(s) Both EYES two times a day  dexAMETHasone     Tablet 6 milliGRAM(s) Oral every 24 hours  dextrose 40% Gel 15 Gram(s) Oral once  dextrose 5%. 1000 milliLiter(s) (50 mL/Hr) IV Continuous <Continuous>  dextrose 5%. 1000 milliLiter(s) (100 mL/Hr) IV Continuous <Continuous>  dextrose 50% Injectable 25 Gram(s) IV Push once  dextrose 50% Injectable 12.5 Gram(s) IV Push once  dextrose 50% Injectable 25 Gram(s) IV Push once  enoxaparin Injectable 40 milliGRAM(s) SubCutaneous every 24 hours  glucagon  Injectable 1 milliGRAM(s) IntraMuscular once  insulin glargine Injectable (LANTUS) 13 Unit(s) SubCutaneous at bedtime  insulin lispro (ADMELOG) corrective regimen sliding scale   SubCutaneous Before meals and at bedtime  insulin lispro Injectable (ADMELOG) 6 Unit(s) SubCutaneous three times a day before meals  levothyroxine 75 MICROGram(s) Oral daily  melatonin 3 milliGRAM(s) Oral at bedtime  multivitamin  Chewable 1 Tablet(s) Oral daily  pantoprazole    Tablet 40 milliGRAM(s) Oral before breakfast  potassium chloride    Tablet ER 20 milliEquivalent(s) Oral once    MEDICATIONS  (PRN):  acetaminophen     Tablet .. 650 milliGRAM(s) Oral every 6 hours PRN Temp greater or equal to 38C (100.4F), Mild Pain (1 - 3)  guaiFENesin Oral Liquid (Sugar-Free) 100 milliGRAM(s) Oral every 6 hours PRN Cough      LABS:                        12.2   13.59 )-----------( 365      ( 19 Jan 2022 07:32 )             38.8     01-19    138  |  102  |  22  ----------------------------<  146<H>  4.5   |  28  |  0.87    Ca    8.5      19 Jan 2022 07:32  Phos  3.8     01-19  Mg     2.1     01-19    TPro  5.9<L>  /  Alb  2.6<L>  /  TBili  0.4  /  DBili  x   /  AST  36  /  ALT  58<H>  /  AlkPhos  81  01-19     INCOMPLETE NOTE     Pulmonary Consult    Patient is a 89y old  Female who presents with a chief complaint of COVID PNA (19 Jan 2022 14:14)    HPI:  Pt is a 89 yo F with PMH DM, hypothyroidism, HLD, and s/p Pfizer x2 who p/w ERICKSON, decreased PO intake, and hypoxia x3d. Tested positive for COVID 1/2/22 and initial pulse ox readings were in the mid 90s. Over the course of the last 2d pt went to mid 80s and then 70s on day of presentation. Had been working with PCP Dr. Sarabia to get home O2 but they were unable to obtain. Of note, pt returned from a trip to [place] 12/19/21. Lives with daughter's family who initially tested negative when she was positive, and then later they too tested positive. Pt with mild dry cough. Denies HA, changes to vision, CP, abd pain, changes to BMs or urination, pain in extremities. At baseline is fully independent. No other concerns or complaints.     On arrival, T 98.1, , /76, RR 24 O2sat 81% RA -- 99% NRB -- 97% 4L NC. EKG with ST no ischemic changes, QTc 535. Labs with WBC 12.77, 82.1% neutrophils, K 3.6, glu 199, d-dimer 598, albumin 2.6, ferritin and procal pending. COVID+. CXR with BL infiltrates L>R. Pt given decadron 6mg. Admitted to [facility] for further observation and management.     Patient seen and examined at bedside. Patient reports feeling much better today. No acute complaints at this time. Denies headache, dizziness, cough, SOB, chest pain, palpitations, abdominal pain, N/V/D.       PAST MEDICAL & SURGICAL HISTORY:  DM (diabetes mellitus)    HLD (hyperlipidemia)    Hypothyroid    No significant past surgical history      FAMILY HISTORY:  No pertinent family history in first degree relatives      Social History:  lives with daughter's family  fully independent  denies toxic substances (12 Jan 2022 02:29)    Allergies    No Known Allergies    Intolerances      REVIEW OF SYSTEMS:  Constitutional: No fevers or chills or weight loss.   Eyes: No itching or discharge from the eyes  ENT:  No post nasal drip. No epistaxis. No throat pain. . No difficulty swallowing.   CV: No chest pain. No palpitations.  or dizziness.   Resp: No sob or cough or wheezing or hemoptysis or pleuritic chest pain   GI: No nausea. No vomiting. No diarrhea or abdominal pain   MSK: No joint pain or pain in any extremities  Integumentary: No skin lesions. No pedal edema.  Neurological: No gross motor weakness. No sensory changes.    PHYSICAL EXAM:  Vital Signs Last 24 Hrs  T(C): 36.4 (19 Jan 2022 12:27), Max: 36.9 (18 Jan 2022 16:15)  T(F): 97.5 (19 Jan 2022 12:27), Max: 98.5 (18 Jan 2022 16:15)  HR: 78 (19 Jan 2022 12:27) (63 - 78)  BP: 103/63 (19 Jan 2022 12:27) (103/63 - 118/74)  BP(mean): 89 (18 Jan 2022 16:15) (89 - 89)  RR: 18 (19 Jan 2022 12:27) (17 - 18)  SpO2: 95% (19 Jan 2022 12:27) (93% - 96%)    General: Sitting in bed, Awake, alert, oriented X 3.   HEENT: Atraumatic, normocephalic.   Neck: No JVD no lymphadenopathy   Respiratory: Breathing comfortably on 6L. No accessory muscle use. Rhonchi L>R  Cardiovascular: S1 S2 normal. RRR   Abdomen: Soft, non-tender, non-distended. No organomegaly  Extremities: Dependent LE edema, no calf tenderness   Skin: No rashes or skin lesions  Neurological: Moving all the extremities with out weakness       HOSPITAL MEDICATIONS:  MEDICATIONS  (STANDING):  artificial tears (preservative free) Ophthalmic Solution 1 Drop(s) Both EYES two times a day  dexAMETHasone     Tablet 6 milliGRAM(s) Oral every 24 hours  dextrose 40% Gel 15 Gram(s) Oral once  dextrose 5%. 1000 milliLiter(s) (50 mL/Hr) IV Continuous <Continuous>  dextrose 5%. 1000 milliLiter(s) (100 mL/Hr) IV Continuous <Continuous>  dextrose 50% Injectable 25 Gram(s) IV Push once  dextrose 50% Injectable 12.5 Gram(s) IV Push once  dextrose 50% Injectable 25 Gram(s) IV Push once  enoxaparin Injectable 40 milliGRAM(s) SubCutaneous every 24 hours  glucagon  Injectable 1 milliGRAM(s) IntraMuscular once  insulin glargine Injectable (LANTUS) 13 Unit(s) SubCutaneous at bedtime  insulin lispro (ADMELOG) corrective regimen sliding scale   SubCutaneous Before meals and at bedtime  insulin lispro Injectable (ADMELOG) 6 Unit(s) SubCutaneous three times a day before meals  levothyroxine 75 MICROGram(s) Oral daily  melatonin 3 milliGRAM(s) Oral at bedtime  multivitamin  Chewable 1 Tablet(s) Oral daily  pantoprazole    Tablet 40 milliGRAM(s) Oral before breakfast  potassium chloride    Tablet ER 20 milliEquivalent(s) Oral once    MEDICATIONS  (PRN):  acetaminophen     Tablet .. 650 milliGRAM(s) Oral every 6 hours PRN Temp greater or equal to 38C (100.4F), Mild Pain (1 - 3)  guaiFENesin Oral Liquid (Sugar-Free) 100 milliGRAM(s) Oral every 6 hours PRN Cough      LABS:                        12.2   13.59 )-----------( 365      ( 19 Jan 2022 07:32 )             38.8     01-19    138  |  102  |  22  ----------------------------<  146<H>  4.5   |  28  |  0.87    Ca    8.5      19 Jan 2022 07:32  Phos  3.8     01-19  Mg     2.1     01-19    TPro  5.9<L>  /  Alb  2.6<L>  /  TBili  0.4  /  DBili  x   /  AST  36  /  ALT  58<H>  /  AlkPhos  81  01-19     Pulmonary Consult    Patient is a 89y old  Female who presents with a chief complaint of COVID PNA (19 Jan 2022 14:14)    HPI:  Pt is a 89 yo F with PMH DM, hypothyroidism, HLD, and s/p Pfizer x2 who p/w ERICKSON, decreased PO intake, and hypoxia x3d. Tested positive for COVID 1/2/22 and initial pulse ox readings were in the mid 90s. Over the course of the last 2d pt went to mid 80s and then 70s on day of presentation. Had been working with PCP Dr. Sarabia to get home O2 but they were unable to obtain. Of note, pt returned from a trip to [place] 12/19/21. Lives with daughter's family who initially tested negative when she was positive, and then later they too tested positive. Pt with mild dry cough. Denies HA, changes to vision, CP, abd pain, changes to BMs or urination, pain in extremities. At baseline is fully independent. No other concerns or complaints.     On arrival, T 98.1, , /76, RR 24 O2sat 81% RA -- 99% NRB -- 97% 4L NC. EKG with ST no ischemic changes, QTc 535. Labs with WBC 12.77, 82.1% neutrophils, K 3.6, glu 199, d-dimer 598, albumin 2.6, ferritin and procal pending. COVID+. CXR with BL infiltrates L>R. Pt given decadron 6mg. Admitted to [facility] for further observation and management.     Patient seen and examined at bedside. Patient reports feeling much better today. No acute complaints at this time. Denies headache, dizziness, cough, SOB, chest pain, palpitations, abdominal pain, N/V/D.       PAST MEDICAL & SURGICAL HISTORY:  DM (diabetes mellitus)    HLD (hyperlipidemia)    Hypothyroid    No significant past surgical history      FAMILY HISTORY:  No pertinent family history in first degree relatives      Social History:  lives with daughter's family  fully independent  denies toxic substances (12 Jan 2022 02:29)    Allergies    No Known Allergies    Intolerances      REVIEW OF SYSTEMS:  Constitutional: No fevers or chills or weight loss.   Eyes: No itching or discharge from the eyes  ENT:  No post nasal drip. No epistaxis. No throat pain. . No difficulty swallowing.   CV: No chest pain. No palpitations.  or dizziness.   Resp: No sob or cough or wheezing or hemoptysis or pleuritic chest pain   GI: No nausea. No vomiting. No diarrhea or abdominal pain   MSK: No joint pain or pain in any extremities  Integumentary: No skin lesions. No pedal edema.  Neurological: No gross motor weakness. No sensory changes.    PHYSICAL EXAM:  Vital Signs Last 24 Hrs  T(C): 36.4 (19 Jan 2022 12:27), Max: 36.9 (18 Jan 2022 16:15)  T(F): 97.5 (19 Jan 2022 12:27), Max: 98.5 (18 Jan 2022 16:15)  HR: 78 (19 Jan 2022 12:27) (63 - 78)  BP: 103/63 (19 Jan 2022 12:27) (103/63 - 118/74)  BP(mean): 89 (18 Jan 2022 16:15) (89 - 89)  RR: 18 (19 Jan 2022 12:27) (17 - 18)  SpO2: 95% (19 Jan 2022 12:27) (93% - 96%)    General: Sitting in bed, Awake, alert, oriented X 3.   HEENT: Atraumatic, normocephalic.   Neck: No JVD no lymphadenopathy   Respiratory: Breathing comfortably on 6L. No accessory muscle use. Bilateral crackles L>R  Cardiovascular: S1 S2 normal. RRR   Abdomen: Soft, non-tender, non-distended. No organomegaly  Extremities: Dependent LE edema, no calf tenderness   Skin: No rashes or skin lesions  Neurological: Moving all the extremities with out weakness       HOSPITAL MEDICATIONS:  MEDICATIONS  (STANDING):  artificial tears (preservative free) Ophthalmic Solution 1 Drop(s) Both EYES two times a day  dexAMETHasone     Tablet 6 milliGRAM(s) Oral every 24 hours  dextrose 40% Gel 15 Gram(s) Oral once  dextrose 5%. 1000 milliLiter(s) (50 mL/Hr) IV Continuous <Continuous>  dextrose 5%. 1000 milliLiter(s) (100 mL/Hr) IV Continuous <Continuous>  dextrose 50% Injectable 25 Gram(s) IV Push once  dextrose 50% Injectable 12.5 Gram(s) IV Push once  dextrose 50% Injectable 25 Gram(s) IV Push once  enoxaparin Injectable 40 milliGRAM(s) SubCutaneous every 24 hours  glucagon  Injectable 1 milliGRAM(s) IntraMuscular once  insulin glargine Injectable (LANTUS) 13 Unit(s) SubCutaneous at bedtime  insulin lispro (ADMELOG) corrective regimen sliding scale   SubCutaneous Before meals and at bedtime  insulin lispro Injectable (ADMELOG) 6 Unit(s) SubCutaneous three times a day before meals  levothyroxine 75 MICROGram(s) Oral daily  melatonin 3 milliGRAM(s) Oral at bedtime  multivitamin  Chewable 1 Tablet(s) Oral daily  pantoprazole    Tablet 40 milliGRAM(s) Oral before breakfast  potassium chloride    Tablet ER 20 milliEquivalent(s) Oral once    MEDICATIONS  (PRN):  acetaminophen     Tablet .. 650 milliGRAM(s) Oral every 6 hours PRN Temp greater or equal to 38C (100.4F), Mild Pain (1 - 3)  guaiFENesin Oral Liquid (Sugar-Free) 100 milliGRAM(s) Oral every 6 hours PRN Cough      LABS:                        12.2   13.59 )-----------( 365      ( 19 Jan 2022 07:32 )             38.8     01-19    138  |  102  |  22  ----------------------------<  146<H>  4.5   |  28  |  0.87    Ca    8.5      19 Jan 2022 07:32  Phos  3.8     01-19  Mg     2.1     01-19    TPro  5.9<L>  /  Alb  2.6<L>  /  TBili  0.4  /  DBili  x   /  AST  36  /  ALT  58<H>  /  AlkPhos  81  01-19

## 2022-01-19 NOTE — PROGRESS NOTE ADULT - ATTENDING COMMENTS
remains stable on 4-6L NC, will attempt to get home oxygen with potential discharge for tomorrow with family per request
Ms. Dobson is a 89F who presented with decrease PO intake and hypoxia from home.   1. COVID-19 PNA  2. Acute Hypoxic Respiratory Failure  3. Sepsis 2/2#1  4. Debility  5. Chronic Hypothyroidism  6. NIDDM2    remains 6L NC - will attempt to wean as tolerated. She appears clinically stable, just remains difficult to wean. Monitor for any increase in oxygen requirement/need for tele upgrade.
remains difficult to wean off 6L NC - desats quickly. Requested for PT to re-evaluate her today for DOMENICA placement
Ms. Dobson is a 89F who presented with hypoxia from home - was able to stand with PT yesterday. This morning she says she wants to get up and walk around, she is very restless. She is on 6L NC, difficult to wean down. Monitor oxygen requirements closely and if increase will likely need ICU team consult

## 2022-01-19 NOTE — PROGRESS NOTE ADULT - SUBJECTIVE AND OBJECTIVE BOX
CC: Patient is a 89y old  Female who presents with a chief complaint of COVID PNA (18 Jan 2022 17:19)      INTERVAL EVENTS: MARJAN    SUBJECTIVE / INTERVAL HPI: Patient seen and examined at bedside. Pt says cough still present and still weak and sob.    ROS: negative unless otherwise stated above.    VITAL SIGNS:  Vital Signs Last 24 Hrs  T(C): 36.4 (19 Jan 2022 12:27), Max: 36.9 (18 Jan 2022 16:15)  T(F): 97.5 (19 Jan 2022 12:27), Max: 98.5 (18 Jan 2022 16:15)  HR: 78 (19 Jan 2022 12:27) (63 - 78)  BP: 103/63 (19 Jan 2022 12:27) (103/63 - 118/74)  BP(mean): 89 (18 Jan 2022 16:15) (89 - 89)  RR: 18 (19 Jan 2022 12:27) (17 - 18)  SpO2: 95% (19 Jan 2022 12:27) (93% - 96%)      01-18-22 @ 07:01  -  01-19-22 @ 07:00  --------------------------------------------------------  IN: 0 mL / OUT: 1000 mL / NET: -1000 mL        PHYSICAL EXAM:    General: NAD  HEENT: MMM  Neck: supple  Cardiovascular: +S1/S2; RRR  Respiratory: rhonchi  Gastrointestinal: soft, NT/ND  Extremities: WWP; no edema, clubbing or cyanosis  Vascular: 2+ radial, DP/PT pulses B/L  Neurological: AAOx3; no focal deficits, weak    MEDICATIONS:  MEDICATIONS  (STANDING):  artificial tears (preservative free) Ophthalmic Solution 1 Drop(s) Both EYES two times a day  dexAMETHasone     Tablet 6 milliGRAM(s) Oral every 24 hours  dextrose 40% Gel 15 Gram(s) Oral once  dextrose 5%. 1000 milliLiter(s) (50 mL/Hr) IV Continuous <Continuous>  dextrose 5%. 1000 milliLiter(s) (100 mL/Hr) IV Continuous <Continuous>  dextrose 50% Injectable 25 Gram(s) IV Push once  dextrose 50% Injectable 12.5 Gram(s) IV Push once  dextrose 50% Injectable 25 Gram(s) IV Push once  enoxaparin Injectable 40 milliGRAM(s) SubCutaneous every 24 hours  glucagon  Injectable 1 milliGRAM(s) IntraMuscular once  insulin glargine Injectable (LANTUS) 13 Unit(s) SubCutaneous at bedtime  insulin lispro (ADMELOG) corrective regimen sliding scale   SubCutaneous Before meals and at bedtime  insulin lispro Injectable (ADMELOG) 4 Unit(s) SubCutaneous three times a day before meals  levothyroxine 75 MICROGram(s) Oral daily  melatonin 3 milliGRAM(s) Oral at bedtime  multivitamin  Chewable 1 Tablet(s) Oral daily  pantoprazole    Tablet 40 milliGRAM(s) Oral before breakfast  potassium chloride    Tablet ER 20 milliEquivalent(s) Oral once    MEDICATIONS  (PRN):  acetaminophen     Tablet .. 650 milliGRAM(s) Oral every 6 hours PRN Temp greater or equal to 38C (100.4F), Mild Pain (1 - 3)  guaiFENesin Oral Liquid (Sugar-Free) 100 milliGRAM(s) Oral every 6 hours PRN Cough      ALLERGIES:  Allergies    No Known Allergies    Intolerances        LABS:                        12.2   13.59 )-----------( 365      ( 19 Jan 2022 07:32 )             38.8     01-19    138  |  102  |  22  ----------------------------<  146<H>  4.5   |  28  |  0.87    Ca    8.5      19 Jan 2022 07:32  Phos  3.8     01-19  Mg     2.1     01-19    TPro  5.9<L>  /  Alb  2.6<L>  /  TBili  0.4  /  DBili  x   /  AST  36  /  ALT  58<H>  /  AlkPhos  81  01-19        CAPILLARY BLOOD GLUCOSE      POCT Blood Glucose.: 260 mg/dL (19 Jan 2022 12:32)      RADIOLOGY & ADDITIONAL TESTS: Reviewed.

## 2022-01-19 NOTE — CONSULT NOTE ADULT - ASSESSMENT
Patient is a 89 yo F with PMH DM, hypothyroidism, HLD, and s/p Pfizer x2, +COVID dx on 1/2/22, presented to the hospital with ERICKSON, decreased PO intake, and hypoxia x3d. Admitted for acute hypoxic respiratory failure. Pulmonary team consulted for further recommendations in setting of persistent 5-6L O2 requirements. Primary team has been unsuccessful weaning the Patient below 5-6L NC. Patient has completed course of Remdesivir on 1/16 and will complete 10 day course of Decadron tomorrow 1/20. Patient appears clinically stable at this time -  Patient is a 89 yo F with PMH DM, hypothyroidism, HLD, and s/p Pfizer x2, +COVID dx on 1/2/22, presented to the hospital with ERICKSON, decreased PO intake, and hypoxia x3d. Admitted for acute hypoxic respiratory failure. Pulmonary team consulted for further recommendations in setting of persistent 5-6L O2 requirements. Primary team has been unsuccessful weaning the Patient below 5-6L NC over the last week. Patient has completed course of Remdesivir on 1/16 and will complete 10 day course of Decadron tomorrow 1/20. Patient appears clinically stable at this time - no new fevers, improving inflammatory markers, no increased O2 increased, stable CXR. No concern for bacterial infection. Low suspicion for acute PE. Patient needs time for recovery of COVID-19 PNA given old age.     Recommendations:   - Continue Decadron until 1/20   - Daily incentive spirometer use and breathing exercises   - Out of bed to chair   - Continue to wean off NC as tolerated to maintain O2 >92%  - No further imaging or antibiotic treatment indicated at this time. Reconsider if clinical status changes    Please see attending attestation for final recommendations.

## 2022-01-19 NOTE — CONSULT NOTE ADULT - ATTENDING COMMENTS
Agree. Looks pretty great for Covid at the age of 89. Rales on exam c/w Covid. No evidence for a 2nd treatable process - bacterial pneumonia, PE, heart failure. Needs more time. Complete decadron.

## 2022-01-19 NOTE — CHART NOTE - NSCHARTNOTEFT_GEN_A_CORE
Admitting Diagnosis:   Patient is a 89y old  Female who presents with a chief complaint of COVID PNA (18 Jan 2022 17:19)      PAST MEDICAL & SURGICAL HISTORY:  DM (diabetes mellitus)    HLD (hyperlipidemia)    Hypothyroid    No significant past surgical history        Current Nutrition Order :C-CHO no snack/pureed with 2 Glucerna shakes       PO Intake: Good (%) [   ]  Fair (50-75%) [ x  ] Poor (<25%) [   ]Variable po intake    GI Issues: BM 1/15    Pain :none noted    Skin Integrity :intact PU wise    Labs:   01-19    138  |  102  |  22  ----------------------------<  146<H>  4.5   |  28  |  0.87    Ca    8.5      19 Jan 2022 07:32  Phos  3.8     01-19  Mg     2.1     01-19    TPro  5.9<L>  /  Alb  2.6<L>  /  TBili  0.4  /  DBili  x   /  AST  36  /  ALT  58<H>  /  AlkPhos  81  01-19    CAPILLARY BLOOD GLUCOSE      POCT Blood Glucose.: 151 mg/dL (19 Jan 2022 09:11)  POCT Blood Glucose.: 267 mg/dL (18 Jan 2022 22:41)  POCT Blood Glucose.: 251 mg/dL (18 Jan 2022 17:55)      Medications:  MEDICATIONS  (STANDING):  artificial tears (preservative free) Ophthalmic Solution 1 Drop(s) Both EYES two times a day  dexAMETHasone     Tablet 6 milliGRAM(s) Oral every 24 hours  dextrose 40% Gel 15 Gram(s) Oral once  dextrose 5%. 1000 milliLiter(s) (50 mL/Hr) IV Continuous <Continuous>  dextrose 5%. 1000 milliLiter(s) (100 mL/Hr) IV Continuous <Continuous>  dextrose 50% Injectable 25 Gram(s) IV Push once  dextrose 50% Injectable 12.5 Gram(s) IV Push once  dextrose 50% Injectable 25 Gram(s) IV Push once  enoxaparin Injectable 40 milliGRAM(s) SubCutaneous every 24 hours  glucagon  Injectable 1 milliGRAM(s) IntraMuscular once  insulin glargine Injectable (LANTUS) 13 Unit(s) SubCutaneous at bedtime  insulin lispro (ADMELOG) corrective regimen sliding scale   SubCutaneous Before meals and at bedtime  insulin lispro Injectable (ADMELOG) 4 Unit(s) SubCutaneous three times a day before meals  levothyroxine 75 MICROGram(s) Oral daily  melatonin 3 milliGRAM(s) Oral at bedtime  multivitamin  Chewable 1 Tablet(s) Oral daily  pantoprazole    Tablet 40 milliGRAM(s) Oral before breakfast  potassium chloride    Tablet ER 20 milliEquivalent(s) Oral once    MEDICATIONS  (PRN):  acetaminophen     Tablet .. 650 milliGRAM(s) Oral every 6 hours PRN Temp greater or equal to 38C (100.4F), Mild Pain (1 - 3)  guaiFENesin Oral Liquid (Sugar-Free) 100 milliGRAM(s) Oral every 6 hours PRN Cough      Weight:54.4kg  Daily     Daily     Weight Change: UBW:72.6,No updated weights since admission.IBW:53.6kg    Estimated energy needs: Based on Admitted weight due to between % of IBW.ABW: 54.4kg [id number]:1337-1605kcal and 1.2-1.4gmprotein:64.2-75gmprotein and 30-35cc fluids:1605-1872cc fluids   Subjective: Pt is a 89 yo F with PMH DM, hypothyroidism, HLD, and s/p Pfizer x2 who p/w ERICKSON, decreased PO intake, and hypoxia x3d. Found to be COVID+ with B/L infiltrates on CXR. Admitted to COVID-19 [facility]. Appetite poor reported due to presently on 6liter O2.Pending GOC/ palliative discussion.    Previous Nutrition Diagnosis: Increased nutrient needs r/t increased demand for kcal/protein and nutrients AEB: age and COVID-19+ demands    Active [ x  ]  Resolved [   ]ongoing    If resolved, new PES: Inadequate oral intake r/t inability to meet EER 2/2 6liter O2 AEB:<25% EER met    Goal: align nutrition with goals of care at all times  Recommendations:1.Honor food requests 2.Assist with meals and tray set up 3.Updated weights    Education: not indicated    Risk Level: High [  x ] Moderate [   ] Low [   ]

## 2022-01-20 DIAGNOSIS — Z71.89 OTHER SPECIFIED COUNSELING: ICD-10-CM

## 2022-01-20 DIAGNOSIS — Z51.5 ENCOUNTER FOR PALLIATIVE CARE: ICD-10-CM

## 2022-01-20 DIAGNOSIS — R05.9 COUGH, UNSPECIFIED: ICD-10-CM

## 2022-01-20 DIAGNOSIS — R53.81 OTHER MALAISE: ICD-10-CM

## 2022-01-20 LAB
ALBUMIN SERPL ELPH-MCNC: 2.9 G/DL — LOW (ref 3.3–5)
ALP SERPL-CCNC: 82 U/L — SIGNIFICANT CHANGE UP (ref 40–120)
ALT FLD-CCNC: 55 U/L — HIGH (ref 10–45)
ANION GAP SERPL CALC-SCNC: 9 MMOL/L — SIGNIFICANT CHANGE UP (ref 5–17)
AST SERPL-CCNC: 33 U/L — SIGNIFICANT CHANGE UP (ref 10–40)
BASOPHILS # BLD AUTO: 0.06 K/UL — SIGNIFICANT CHANGE UP (ref 0–0.2)
BASOPHILS NFR BLD AUTO: 0.5 % — SIGNIFICANT CHANGE UP (ref 0–2)
BILIRUB SERPL-MCNC: 0.4 MG/DL — SIGNIFICANT CHANGE UP (ref 0.2–1.2)
BUN SERPL-MCNC: 19 MG/DL — SIGNIFICANT CHANGE UP (ref 7–23)
CALCIUM SERPL-MCNC: 9 MG/DL — SIGNIFICANT CHANGE UP (ref 8.4–10.5)
CHLORIDE SERPL-SCNC: 101 MMOL/L — SIGNIFICANT CHANGE UP (ref 96–108)
CO2 SERPL-SCNC: 28 MMOL/L — SIGNIFICANT CHANGE UP (ref 22–31)
CREAT SERPL-MCNC: 0.79 MG/DL — SIGNIFICANT CHANGE UP (ref 0.5–1.3)
EOSINOPHIL # BLD AUTO: 0 K/UL — SIGNIFICANT CHANGE UP (ref 0–0.5)
EOSINOPHIL NFR BLD AUTO: 0 % — SIGNIFICANT CHANGE UP (ref 0–6)
GLUCOSE BLDC GLUCOMTR-MCNC: 118 MG/DL — HIGH (ref 70–99)
GLUCOSE BLDC GLUCOMTR-MCNC: 205 MG/DL — HIGH (ref 70–99)
GLUCOSE BLDC GLUCOMTR-MCNC: 216 MG/DL — HIGH (ref 70–99)
GLUCOSE BLDC GLUCOMTR-MCNC: 217 MG/DL — HIGH (ref 70–99)
GLUCOSE SERPL-MCNC: 116 MG/DL — HIGH (ref 70–99)
HCT VFR BLD CALC: 40.4 % — SIGNIFICANT CHANGE UP (ref 34.5–45)
HGB BLD-MCNC: 12.4 G/DL — SIGNIFICANT CHANGE UP (ref 11.5–15.5)
IMM GRANULOCYTES NFR BLD AUTO: 1.7 % — HIGH (ref 0–1.5)
LYMPHOCYTES # BLD AUTO: 18.9 % — SIGNIFICANT CHANGE UP (ref 13–44)
LYMPHOCYTES # BLD AUTO: 2.47 K/UL — SIGNIFICANT CHANGE UP (ref 1–3.3)
MAGNESIUM SERPL-MCNC: 2.2 MG/DL — SIGNIFICANT CHANGE UP (ref 1.6–2.6)
MCHC RBC-ENTMCNC: 28.7 PG — SIGNIFICANT CHANGE UP (ref 27–34)
MCHC RBC-ENTMCNC: 30.7 GM/DL — LOW (ref 32–36)
MCV RBC AUTO: 93.5 FL — SIGNIFICANT CHANGE UP (ref 80–100)
MONOCYTES # BLD AUTO: 0.94 K/UL — HIGH (ref 0–0.9)
MONOCYTES NFR BLD AUTO: 7.2 % — SIGNIFICANT CHANGE UP (ref 2–14)
NEUTROPHILS # BLD AUTO: 9.39 K/UL — HIGH (ref 1.8–7.4)
NEUTROPHILS NFR BLD AUTO: 71.7 % — SIGNIFICANT CHANGE UP (ref 43–77)
NRBC # BLD: 0 /100 WBCS — SIGNIFICANT CHANGE UP (ref 0–0)
PHOSPHATE SERPL-MCNC: 3.9 MG/DL — SIGNIFICANT CHANGE UP (ref 2.5–4.5)
PLATELET # BLD AUTO: 357 K/UL — SIGNIFICANT CHANGE UP (ref 150–400)
POTASSIUM SERPL-MCNC: 4.3 MMOL/L — SIGNIFICANT CHANGE UP (ref 3.5–5.3)
POTASSIUM SERPL-SCNC: 4.3 MMOL/L — SIGNIFICANT CHANGE UP (ref 3.5–5.3)
PROT SERPL-MCNC: 6.3 G/DL — SIGNIFICANT CHANGE UP (ref 6–8.3)
RBC # BLD: 4.32 M/UL — SIGNIFICANT CHANGE UP (ref 3.8–5.2)
RBC # FLD: 13.2 % — SIGNIFICANT CHANGE UP (ref 10.3–14.5)
SODIUM SERPL-SCNC: 138 MMOL/L — SIGNIFICANT CHANGE UP (ref 135–145)
WBC # BLD: 13.08 K/UL — HIGH (ref 3.8–10.5)
WBC # FLD AUTO: 13.08 K/UL — HIGH (ref 3.8–10.5)

## 2022-01-20 PROCEDURE — 99233 SBSQ HOSP IP/OBS HIGH 50: CPT

## 2022-01-20 RX ORDER — POLYETHYLENE GLYCOL 3350 17 G/17G
17 POWDER, FOR SOLUTION ORAL EVERY 12 HOURS
Refills: 0 | Status: DISCONTINUED | OUTPATIENT
Start: 2022-01-20 | End: 2022-01-21

## 2022-01-20 RX ORDER — SENNA PLUS 8.6 MG/1
2 TABLET ORAL AT BEDTIME
Refills: 0 | Status: DISCONTINUED | OUTPATIENT
Start: 2022-01-20 | End: 2022-01-21

## 2022-01-20 RX ADMIN — Medication 75 MICROGRAM(S): at 07:00

## 2022-01-20 RX ADMIN — Medication 1 TABLET(S): at 11:15

## 2022-01-20 RX ADMIN — INSULIN GLARGINE 13 UNIT(S): 100 INJECTION, SOLUTION SUBCUTANEOUS at 23:17

## 2022-01-20 RX ADMIN — Medication 2: at 13:06

## 2022-01-20 RX ADMIN — Medication 6 UNIT(S): at 18:48

## 2022-01-20 RX ADMIN — Medication 6 UNIT(S): at 09:17

## 2022-01-20 RX ADMIN — Medication 1 DROP(S): at 17:53

## 2022-01-20 RX ADMIN — SENNA PLUS 2 TABLET(S): 8.6 TABLET ORAL at 22:52

## 2022-01-20 RX ADMIN — Medication 2: at 23:17

## 2022-01-20 RX ADMIN — PANTOPRAZOLE SODIUM 40 MILLIGRAM(S): 20 TABLET, DELAYED RELEASE ORAL at 06:28

## 2022-01-20 RX ADMIN — Medication 6 UNIT(S): at 13:07

## 2022-01-20 RX ADMIN — Medication 1 DROP(S): at 06:28

## 2022-01-20 RX ADMIN — Medication 2: at 18:17

## 2022-01-20 RX ADMIN — ENOXAPARIN SODIUM 40 MILLIGRAM(S): 100 INJECTION SUBCUTANEOUS at 06:28

## 2022-01-20 RX ADMIN — POLYETHYLENE GLYCOL 3350 17 GRAM(S): 17 POWDER, FOR SOLUTION ORAL at 22:52

## 2022-01-20 RX ADMIN — Medication 6 MILLIGRAM(S): at 06:28

## 2022-01-20 RX ADMIN — POLYETHYLENE GLYCOL 3350 17 GRAM(S): 17 POWDER, FOR SOLUTION ORAL at 11:15

## 2022-01-20 RX ADMIN — Medication 20 MILLIEQUIVALENT(S): at 06:33

## 2022-01-20 NOTE — PROGRESS NOTE ADULT - SUBJECTIVE AND OBJECTIVE BOX
SUBJECTIVE: pt seen and examined. c/o cough. no prn guaifenesin administered in last 24h. continues to feel weak, but overall feels "better". on 4L    OVERNIGHT EVENTS: none     DNR in chart: No. Pt and family decline / not ready to discuss code status further.       If  [ ] blank, symptom not present  Dyspnea:                           [x ]Mild [ ]Moderate [ ]Severe  Anxiety:                             [ ]Mild [ ]Moderate [ ]Severe  Fatigue:                             [ ]Mild [x ]Moderate [x ]Severe  Nausea:                             [ ]Mild [ ]Moderate [ ]Severe  Loss of appetite:              [ ]Mild [ ]Moderate [ ]Severe  Constipation:                    [ ]Mild [x ]Moderate [ ]Severe LBM 1/15  Grief Present                    [ ] Yes   [ x] No     Pain  denies     Rest of 12 point review of systems negative unless documented otherwise in note.    PEx:  T(C): 36.5 (01-20-22 @ 11:57), Max: 37.2 (01-19-22 @ 21:48)  HR: 67 (01-20-22 @ 11:57) (67 - 87)  BP: 122/79 (01-20-22 @ 11:57) (103/63 - 136/75)  RR: 19 (01-20-22 @ 11:57) (18 - 19)  SpO2: 94% (01-20-22 @ 11:57) (92% - 97%)  Wt(kg): --    General: elderly woman sitting up in bed, NAD  HEENT: MMM, no temporal wasting   Neck: supple  Cardiovascular: +S1/S2; RRR  Respiratory: rhonchi throughout, good inspiratory effort, +cough   Gastrointestinal: soft, NT/ND  Extremities: WWP; no edema, clubbing or cyanosis  Vascular: 2+ radial, DP/PT pulses B/L  Neurological: AAOx3; no focal deficits, 3+/5 throughout all extremities   Psych: calm   	   ALLERGIES: No Known Allergies      MEDICATIONS: REVIEWED  MEDICATIONS  (STANDING):  artificial tears (preservative free) Ophthalmic Solution 1 Drop(s) Both EYES two times a day  dexAMETHasone     Tablet 6 milliGRAM(s) Oral every 24 hours  dextrose 40% Gel 15 Gram(s) Oral once  dextrose 5%. 1000 milliLiter(s) (50 mL/Hr) IV Continuous <Continuous>  dextrose 5%. 1000 milliLiter(s) (100 mL/Hr) IV Continuous <Continuous>  dextrose 50% Injectable 25 Gram(s) IV Push once  dextrose 50% Injectable 12.5 Gram(s) IV Push once  dextrose 50% Injectable 25 Gram(s) IV Push once  enoxaparin Injectable 40 milliGRAM(s) SubCutaneous every 24 hours  glucagon  Injectable 1 milliGRAM(s) IntraMuscular once  insulin glargine Injectable (LANTUS) 13 Unit(s) SubCutaneous at bedtime  insulin lispro (ADMELOG) corrective regimen sliding scale   SubCutaneous Before meals and at bedtime  insulin lispro Injectable (ADMELOG) 6 Unit(s) SubCutaneous three times a day before meals  levothyroxine 75 MICROGram(s) Oral daily  melatonin 3 milliGRAM(s) Oral at bedtime  multivitamin  Chewable 1 Tablet(s) Oral daily  pantoprazole    Tablet 40 milliGRAM(s) Oral before breakfast  polyethylene glycol 3350 17 Gram(s) Oral every 12 hours  senna 2 Tablet(s) Oral at bedtime    MEDICATIONS  (PRN):  acetaminophen     Tablet .. 650 milliGRAM(s) Oral every 6 hours PRN Temp greater or equal to 38C (100.4F), Mild Pain (1 - 3)  guaiFENesin Oral Liquid (Sugar-Free) 100 milliGRAM(s) Oral every 6 hours PRN Cough      LABS: REVIEWED  CBC:                        12.4   13.08 )-----------( 357      ( 20 Jan 2022 08:01 )             40.4     CMP:    01-20    138  |  101  |  19  ----------------------------<  116<H>  4.3   |  28  |  0.79    Ca    9.0      20 Jan 2022 08:01  Phos  3.9     01-20  Mg     2.2     01-20    TPro  6.3  /  Alb  2.9<L>  /  TBili  0.4  /  DBili  x   /  AST  33  /  ALT  55<H>  /  AlkPhos  82  01-20    IMAGING: REVIEWED    < from: Xray Chest 1 View- PORTABLE-Urgent (Xray Chest 1 View- PORTABLE-Urgent .) (01.19.22 @ 11:56) >    IMPRESSION:    Scattered increased lung markings similar to previous exam 1/18/2022. No   acute infiltrate appearing. No pleural effusion or pneumothorax. Vascular   calcification thoracic aorta.    < end of copied text >      Decision maker: The patient is able to participate in complex medical decision making conversations.   Legal surrogate: daughterCarla     ADVANCE DIRECTIVES & GOALS OF CARE  - Full Code  - Aggressive  - Family wants to take pt home with home PT; not [facility]  - Daughter does not want teams discussing code status with pt     PSYCHOSOCIAL ASSESSMENT:  - Coping: [x ] well [ ] with difficulty [ ] poor coping [ ] unable to assess dt pt mentation      REFERRALS:  [ ] palliative social work [ ]Chaplaincy  [ ]Hospice  [ ]Child Life  [ ]Social Work  [ ]Case management [  ]Holistic Therapy

## 2022-01-20 NOTE — PROGRESS NOTE ADULT - PROBLEM SELECTOR PLAN 6
- pt with hx HLD, not on meds

## 2022-01-20 NOTE — PROGRESS NOTE ADULT - PROBLEM SELECTOR PLAN 3
RESOLVED   - pt p/w 3d dec PO intake, ERICKSON, and hypoxia in the setting of known COVID+ 1/2/22, was trying to obtain home O2 via PCP but unsuccessful; initial O2sat low 90s--80s--70s on day of presentation.  - on arrival, meeting 3/4 SIRS criteria (tachycardia, tachypnea, and leukocytosis) with likely etiology pulmonary in setting of COVID-19. Now only meeting leukocytosis SIRS 1/4  - CXR with BL infiltrates L>R  - UA negative  - procal normal
RESOLVED   - pt p/w 3d dec PO intake, ERICKSON, and hypoxia in the setting of known COVID+ 1/2/22, was trying to obtain home O2 via PCP but unsuccessful; initial O2sat low 90s--80s--70s on day of presentation.  - on arrival, meeting 3/4 SIRS criteria (tachycardia, tachypnea, and leukocytosis) with likely etiology pulmonary in setting of COVID-19. Now only meeting leukocytosis SIRS 1/4  - CXR with BL infiltrates L>R  - UA negative  - procal normal
.  s/p Pfizer x2 who p/w ERICKSON, decreased PO intake, and hypoxia x3d. tolerating 5LNC, improved from 6-7 L  - positive COVID 1/2/22   - appreciate primary team mgmt
RESOLVED   - pt p/w 3d dec PO intake, ERICKSON, and hypoxia in the setting of known COVID+ 1/2/22, was trying to obtain home O2 via PCP but unsuccessful; initial O2sat low 90s--80s--70s on day of presentation.  - on arrival, meeting 3/4 SIRS criteria (tachycardia, tachypnea, and leukocytosis) with likely etiology pulmonary in setting of COVID-19. Now only meeting leukocytosis SIRS 1/4  - CXR with BL infiltrates L>R  - UA negative  - procal normal
- EKG on arrival ST without ischemia, QTc 535  - f/u AM EKG  - avoid QTc prolonging agents
RESOLVED   - pt p/w 3d dec PO intake, ERICKSON, and hypoxia in the setting of known COVID+ 1/2/22, was trying to obtain home O2 via PCP but unsuccessful; initial O2sat low 90s--80s--70s on day of presentation.  - on arrival, meeting 3/4 SIRS criteria (tachycardia, tachypnea, and leukocytosis) with likely etiology pulmonary in setting of COVID-19. Now only meeting leukocytosis SIRS 1/4  - CXR with BL infiltrates L>R  - UA negative  - procal normal

## 2022-01-20 NOTE — PROGRESS NOTE ADULT - PROBLEM SELECTOR PLAN 1
- continue to trend inflammatory markers  - c/w remdesivir x5d (end 1/16)  - c/w decadron 6mg x10d (end 1/20)  - able to wean to 4L NC, unable to ambulate yet.  - will likely need home O2 on discharge  - only complains of fatigue/weakness  - PT recs [facility] but family wants home with home PT, will discuss with daughter and SW

## 2022-01-20 NOTE — PROGRESS NOTE ADULT - PROBLEM SELECTOR PLAN 5
.  - Coping: [x ] well [ ] with difficulty [ ] poor coping   - Support system: [ ] strong [x ] adequate [ ] inadequate  - All questions answered, emotional support provided  - dw primary team   - Please contact Palliative Medicine 24/7 at [phone] for any acute symptoms or questions   - Thank you for this consult. Palliative medicine will sign off. Please reconsult if the patient's symptoms and/or goals of care change, need to be readdressed, or if patient is readmitted in the future.

## 2022-01-20 NOTE — PROGRESS NOTE ADULT - SUBJECTIVE AND OBJECTIVE BOX
Physical Medicine and Rehabilitation Progress Note:    Patient is a 89y old  Female who presents with a chief complaint of COVID PNA (20 Jan 2022 12:48)      HPI:  Pt is a 89 yo F with PMH DM, hypothyroidism, HLD, and s/p Pfizer x2 who p/w ERICKSON, decreased PO intake, and hypoxia x3d. Tested positive for COVID 1/2/22 and initial pulse ox readings were in the mid 90s. Over the course of the last 2d pt went to mid 80s and then 70s on day of presentation. Had been working with PCP Dr. Sarabia to get home O2 but they were unable to obtain. Of note, pt returned from a trip to [place] 12/19/21. Lives with daughter's family who initially tested negative when she was positive, and then later they too tested positive. Pt with mild dry cough. Denies HA, changes to vision, CP, abd pain, changes to BMs or urination, pain in extremities. At baseline is fully independent. No other concerns or complaints.     On arrival, T 98.1, , /76, RR 24 O2sat 81% RA -- 99% NRB -- 97% 4L NC. EKG with ST no ischemic changes, QTc 535. Labs with WBC 12.77, 82.1% neutrophils, K 3.6, glu 199, d-dimer 598, albumin 2.6, ferritin and procal pending. COVID+. CXR with BL infiltrates L>R. Pt given decadron 6mg. Admitted to [facility] for further observation and management. (12 Jan 2022 02:29)                            12.4   13.08 )-----------( 357      ( 20 Jan 2022 08:01 )             40.4       01-20    138  |  101  |  19  ----------------------------<  116<H>  4.3   |  28  |  0.79    Ca    9.0      20 Jan 2022 08:01  Phos  3.9     01-20  Mg     2.2     01-20    TPro  6.3  /  Alb  2.9<L>  /  TBili  0.4  /  DBili  x   /  AST  33  /  ALT  55<H>  /  AlkPhos  82  01-20    Vital Signs Last 24 Hrs  T(C): 36.5 (20 Jan 2022 11:57), Max: 37.2 (19 Jan 2022 21:48)  T(F): 97.7 (20 Jan 2022 11:57), Max: 98.9 (19 Jan 2022 21:48)  HR: 67 (20 Jan 2022 11:57) (67 - 79)  BP: 122/79 (20 Jan 2022 11:57) (115/80 - 136/75)  BP(mean): 93 (20 Jan 2022 11:57) (93 - 93)  RR: 19 (20 Jan 2022 11:57) (18 - 19)  SpO2: 94% (20 Jan 2022 11:57) (92% - 97%)    MEDICATIONS  (STANDING):  artificial tears (preservative free) Ophthalmic Solution 1 Drop(s) Both EYES two times a day  dexAMETHasone     Tablet 6 milliGRAM(s) Oral every 24 hours  dextrose 40% Gel 15 Gram(s) Oral once  dextrose 5%. 1000 milliLiter(s) (50 mL/Hr) IV Continuous <Continuous>  dextrose 5%. 1000 milliLiter(s) (100 mL/Hr) IV Continuous <Continuous>  dextrose 50% Injectable 25 Gram(s) IV Push once  dextrose 50% Injectable 12.5 Gram(s) IV Push once  dextrose 50% Injectable 25 Gram(s) IV Push once  enoxaparin Injectable 40 milliGRAM(s) SubCutaneous every 24 hours  glucagon  Injectable 1 milliGRAM(s) IntraMuscular once  insulin glargine Injectable (LANTUS) 13 Unit(s) SubCutaneous at bedtime  insulin lispro (ADMELOG) corrective regimen sliding scale   SubCutaneous Before meals and at bedtime  insulin lispro Injectable (ADMELOG) 6 Unit(s) SubCutaneous three times a day before meals  levothyroxine 75 MICROGram(s) Oral daily  melatonin 3 milliGRAM(s) Oral at bedtime  multivitamin  Chewable 1 Tablet(s) Oral daily  pantoprazole    Tablet 40 milliGRAM(s) Oral before breakfast  polyethylene glycol 3350 17 Gram(s) Oral every 12 hours  senna 2 Tablet(s) Oral at bedtime    MEDICATIONS  (PRN):  acetaminophen     Tablet .. 650 milliGRAM(s) Oral every 6 hours PRN Temp greater or equal to 38C (100.4F), Mild Pain (1 - 3)  guaiFENesin Oral Liquid (Sugar-Free) 100 milliGRAM(s) Oral every 6 hours PRN Cough    Currently Undergoing Physical/ Occupational Therapy at bedside.    Functional Status Assessment:   1/19/2022      Therapeutic Interventions      Bed Mobility  Bed Mobility Training Rolling/Turning: moderate assist (50% patient effort);  1 person assist;  verbal cues  Bed Mobility Training Scooting: moderate assist (50% patient effort);  1 person assist;  verbal cues  Bed Mobility Training Sit-to-Supine: moderate assist (50% patient effort);  1 person assist;  verbal cues  Bed Mobility Training Supine-to-Sit: moderate assist (50% patient effort);  verbal cues;  1 person assist  Bed Mobility Training Limitations: decreased strength;  impaired balance    Sit-Stand Transfer Training  Transfer Training Sit-to-Stand Transfer: moderate assist (50% patient effort);  maximum assist (25% patient effort);  1 person assist;  full weight-bearing   arm torso assist   Transfer Training Stand-to-Sit Transfer: moderate assist (50% patient effort);  minimum assist (75% patient effort);  1 person assist;  verbal cues;  full weight-bearing   arm, torso hold   Sit-to-Stand Transfer Training Transfer Safety Analysis: decreased strength;  impaired balance    Gait Training  Gait Training: moderate assist (50% patient effort);  1 person assist;  full weight-bearing   arm-torso hold ;  5 feet;  x2   Gait Analysis: 2-point gait   decreased silvia;  increased time in double stance;  decreased hip/knee flexion;  decreased velocity of limb motion;  decreased step length;  decreased stride length;  decreased swing-to-stance ratio;  impaired balance;  decreased strength          PM&R Impression: as above    Current Disposition Plan Recommendations:    subacute rehab placement

## 2022-01-20 NOTE — PROGRESS NOTE ADULT - PROBLEM SELECTOR PROBLEM 4
Diabetes
Diabetes
Goals of care, counseling/discussion
Diabetes

## 2022-01-20 NOTE — PROGRESS NOTE ADULT - PROBLEM SELECTOR PLAN 4
.  - Pt with capacity to make informed medical decisions using Malaysian    - Daughter Micaela #[phone]  - Code status reviewed, Micaela is clear that she not ready to further discuss code status, DNR DNI  - Micaela requests that code status is not addressed to patient without dtr present, prefers pt dc home with home PT versus transfer to [facility], wants to discuss pt's eligibility for HHA with unit SW/CM  - cw Full code, aggressive mgmt

## 2022-01-20 NOTE — PROGRESS NOTE ADULT - SUBJECTIVE AND OBJECTIVE BOX
CC: Patient is a 89y old  Female who presents with a chief complaint of COVID PNA (20 Jan 2022 12:10)      INTERVAL EVENTS: MARJAN    SUBJECTIVE / INTERVAL HPI: Patient seen and examined at bedside. Pt not having bowel movement in a few days. Pt able to wean down to 4L  however couldn't ambulate today. She feels too weak. Will get PT/OT to help with walking.    ROS: negative unless otherwise stated above.    VITAL SIGNS:  Vital Signs Last 24 Hrs  T(C): 36.5 (20 Jan 2022 11:57), Max: 37.2 (19 Jan 2022 21:48)  T(F): 97.7 (20 Jan 2022 11:57), Max: 98.9 (19 Jan 2022 21:48)  HR: 67 (20 Jan 2022 11:57) (67 - 79)  BP: 122/79 (20 Jan 2022 11:57) (115/80 - 136/75)  BP(mean): 93 (20 Jan 2022 11:57) (93 - 93)  RR: 19 (20 Jan 2022 11:57) (18 - 19)  SpO2: 94% (20 Jan 2022 11:57) (92% - 97%)        PHYSICAL EXAM:    General: NAD  HEENT: MMM  Neck: supple  Cardiovascular: +S1/S2; RRR  Respiratory: rhonchi improved. on 4L now  Gastrointestinal: soft, NT/ND  Extremities: WWP; no edema, clubbing or cyanosis  Vascular: 2+ radial, DP/PT pulses B/L  Neurological: AAOx3; no focal deficits    MEDICATIONS:  MEDICATIONS  (STANDING):  artificial tears (preservative free) Ophthalmic Solution 1 Drop(s) Both EYES two times a day  dexAMETHasone     Tablet 6 milliGRAM(s) Oral every 24 hours  dextrose 40% Gel 15 Gram(s) Oral once  dextrose 5%. 1000 milliLiter(s) (50 mL/Hr) IV Continuous <Continuous>  dextrose 5%. 1000 milliLiter(s) (100 mL/Hr) IV Continuous <Continuous>  dextrose 50% Injectable 25 Gram(s) IV Push once  dextrose 50% Injectable 12.5 Gram(s) IV Push once  dextrose 50% Injectable 25 Gram(s) IV Push once  enoxaparin Injectable 40 milliGRAM(s) SubCutaneous every 24 hours  glucagon  Injectable 1 milliGRAM(s) IntraMuscular once  insulin glargine Injectable (LANTUS) 13 Unit(s) SubCutaneous at bedtime  insulin lispro (ADMELOG) corrective regimen sliding scale   SubCutaneous Before meals and at bedtime  insulin lispro Injectable (ADMELOG) 6 Unit(s) SubCutaneous three times a day before meals  levothyroxine 75 MICROGram(s) Oral daily  melatonin 3 milliGRAM(s) Oral at bedtime  multivitamin  Chewable 1 Tablet(s) Oral daily  pantoprazole    Tablet 40 milliGRAM(s) Oral before breakfast  polyethylene glycol 3350 17 Gram(s) Oral every 12 hours  senna 2 Tablet(s) Oral at bedtime    MEDICATIONS  (PRN):  acetaminophen     Tablet .. 650 milliGRAM(s) Oral every 6 hours PRN Temp greater or equal to 38C (100.4F), Mild Pain (1 - 3)  guaiFENesin Oral Liquid (Sugar-Free) 100 milliGRAM(s) Oral every 6 hours PRN Cough      ALLERGIES:  Allergies    No Known Allergies    Intolerances        LABS:                        12.4   13.08 )-----------( 357      ( 20 Jan 2022 08:01 )             40.4     01-20    138  |  101  |  19  ----------------------------<  116<H>  4.3   |  28  |  0.79    Ca    9.0      20 Jan 2022 08:01  Phos  3.9     01-20  Mg     2.2     01-20    TPro  6.3  /  Alb  2.9<L>  /  TBili  0.4  /  DBili  x   /  AST  33  /  ALT  55<H>  /  AlkPhos  82  01-20        CAPILLARY BLOOD GLUCOSE      POCT Blood Glucose.: 216 mg/dL (20 Jan 2022 12:27)      RADIOLOGY & ADDITIONAL TESTS: Reviewed.

## 2022-01-20 NOTE — PROGRESS NOTE ADULT - PROBLEM SELECTOR PLAN 4
- pt with hx T2D, on repaglinide 2mg outpt  - A1c 8.2  - carb consistent diet  - continue with lantus 14 and lispro 3 TID - BS relatively controlled with this regimen  - will try to go back on home regimen once off steroids

## 2022-01-20 NOTE — PROGRESS NOTE ADULT - ASSESSMENT
Pt is a 89 yo F with PMH DM, hypothyroidism, HLD, and s/p Pfizer x2 who p/w ERICKSON, decreased PO intake, and hypoxia x3d. Found to be COVID+ with BL infiltrates on CXR. Admitted to [facility]
Pt is a 89 yo F with PMH DM, hypothyroidism, HLD, and s/p Pfizer x2 who p/w ERICKSON, decreased PO intake, and hypoxia x3d. Found to be COVID+ with BL infiltrates on CXR. Admitted to [facility]
per Internal Medicine    89 yo F with PMH DM, hypothyroidism, HLD, and s/p Pfizer x2 who p/w ERICKSON, decreased PO intake, and hypoxia x3d. Found to be COVID+ with BL infiltrates on CXR. Admitted to [facility]    Problem/Plan - 1:  ·  Problem: 2019 novel coronavirus disease (COVID-19).   ·  Plan: - continue to trend inflammatory markers  - c/w remdesivir x5d (end 1/16)  - c/w decadron 6mg x10d (end 1/20)  - try to wean daily, she has remained difficult to wean off of 6L NC. She is sating well this AM on the 6L with no complaints.   - desaturates on sitting at the end of the bed  - will likely need home O2 on discharge  - only complains of fatigue/weakness.    Problem/Plan - 2:  ·  Problem: Prolonged QT interval.   ·  Plan: - EKG on arrival ST without ischemia, QTc 535  - f/u AM EKG  - avoid QTc prolonging agents.    Problem/Plan - 3:  ·  Problem: Acute sepsis.   ·  Plan: RESOLVED   - pt p/w 3d dec PO intake, ERICKSON, and hypoxia in the setting of known COVID+ 1/2/22, was trying to obtain home O2 via PCP but unsuccessful; initial O2sat low 90s--80s--70s on day of presentation.  - on arrival, meeting 3/4 SIRS criteria (tachycardia, tachypnea, and leukocytosis) with likely etiology pulmonary in setting of COVID-19. Now only meeting leukocytosis SIRS 1/4  - CXR with BL infiltrates L>R  - UA negative  - procal normal.    Problem/Plan - 4:  ·  Problem: Diabetes.   ·  Plan: - pt with hx T2D, on repaglinide 2mg outpt  - A1c 8.2  - carb consistent diet  - continue with lantus 14 and lispro 3 TID - BS relatively controlled with this regimen.    Problem/Plan - 5:  ·  Problem: Hypothyroidism.   ·  Plan: - pt with hx hypothyroidism, on synthroid 75 mcg outpt  - c/w home med  - TSH normal.    Problem/Plan - 6:  ·  Problem: Hyperlipidemia.   ·  Plan: - pt with hx HLD, not on meds.    Problem/Plan - 7:  ·  Problem: Nutrition, metabolism, and development symptoms.   ·  Plan: - F: none  - E: replete K<4, Mg<2  - N: carb consistent soft and bite sized  - D: lovenox 40mg q24h  - G: protonix 40mg/d while on steroids    code: full  dispo: COVID RMF.  
Pt is a 89 yo F with PMH DM, hypothyroidism, HLD, and s/p Pfizer x2 who p/w ERICKSON, decreased PO intake, and hypoxia x3d. Found to be COVID+ with BL infiltrates on CXR. Admitted to [facility]
per Internal Medicine     89 yo F with PMH DM, hypothyroidism, HLD, and s/p Pfizer x2 who p/w ERICKSON, decreased PO intake, and hypoxia x3d. Found to be COVID+ with BL infiltrates on CXR. Admitted to [facility]    Problem/Plan - 1:  ·  Problem: 2019 novel coronavirus disease (COVID-19).   ·  Plan: - continue to trend inflammatory markers  - c/w remdesivir x5d (end 1/16)  - c/w decadron 6mg x10d (end 1/20)  - able to wean to 4L NC, unable to ambulate yet.  - will likely need home O2 on discharge  - only complains of fatigue/weakness  - PT recs [facility]EN[facility] but family wants home with home PT, will discuss with daughter and SW.    Problem/Plan - 2:  ·  Problem: Prolonged QT interval.   ·  Plan: - EKG on arrival ST without ischemia, QTc 535  - f/u AM EKG  - avoid QTc prolonging agents.    Problem/Plan - 3:  ·  Problem: Acute sepsis.   ·  Plan: RESOLVED   - pt p/w 3d dec PO intake, ERICKSON, and hypoxia in the setting of known COVID+ 1/2/22, was trying to obtain home O2 via PCP but unsuccessful; initial O2sat low 90s--80s--70s on day of presentation.  - on arrival, meeting 3/4 SIRS criteria (tachycardia, tachypnea, and leukocytosis) with likely etiology pulmonary in setting of COVID-19. Now only meeting leukocytosis SIRS 1/4  - CXR with BL infiltrates L>R  - UA negative  - procal normal.    Problem/Plan - 4:  ·  Problem: Diabetes.   ·  Plan: - pt with hx T2D, on repaglinide 2mg outpt  - A1c 8.2  - carb consistent diet  - continue with lantus 14 and lispro 3 TID - BS relatively controlled with this regimen  - will try to go back on home regimen once off steroids.    Problem/Plan - 5:  ·  Problem: Hypothyroidism.   ·  Plan: - pt with hx hypothyroidism, on synthroid 75 mcg outpt  - c/w home med  - TSH normal.    Problem/Plan - 6:  ·  Problem: Hyperlipidemia.   ·  Plan: - pt with hx HLD, not on meds.    Problem/Plan - 7:  ·  Problem: Nutrition, metabolism, and development symptoms.   ·  Plan: - F: none  - E: replete K<4, Mg<2  - N: carb consistent soft and bite sized  - D: lovenox 40mg q24h  - G: protonix 40mg/d while on steroids    code: full  dispo: COVID RMF --> PT recs DOMEN[facility] but family wants home with home PT will discuss w/ SW and family.
Pt is a 89 yo F with PMH DM, hypothyroidism, HLD, and s/p Pfizer x2 who p/w ERICKSON, decreased PO intake, and hypoxia x3d. Found to be COVID+ with BL infiltrates on CXR. Admitted to [facility]
Pt is a 89 yo F with PMH DM, hypothyroidism, HLD, and s/p Pfizer x2 who p/w ERICKSON, decreased PO intake, and hypoxia x3d. Found to be COVID+ with BL infiltrates on CXR. Admitted to [facility] for further observation and management. 
Pt is a 89 yo F with PMH DM, hypothyroidism, HLD, and s/p Pfizer x2 who p/w ERICKSON, decreased PO intake, and hypoxia x3d. Found to be COVID+ with BL infiltrates on CXR. Admitted to [facility]
Pt is a 89 yo F with PMH DM, hypothyroidism, HLD, and s/p Pfizer x2 who p/w ERICKSON, decreased PO intake, and hypoxia x3d. Found to be COVID+ with BL infiltrates on CXR. Admitted to [facility] for further observation and management. 
89 yo F with PMH DM, hypothyroidism, HLD, and s/p Pfizer x2 who presented 1/12 with ERICKSON, decreased PO intake, and worsening hypoxia. admitted for further management of COVID+ 1/2/22. Palliative consulted for [facility] for geriatric pt with COVID. 
Pt is a 89 yo F with PMH DM, hypothyroidism, HLD, and s/p Pfizer x2 who p/w ERICKSON, decreased PO intake, and hypoxia x3d. Found to be COVID+ with BL infiltrates on CXR. Admitted to [facility] for further observation and management.

## 2022-01-20 NOTE — PROGRESS NOTE ADULT - PROBLEM SELECTOR PROBLEM 5
Hypothyroidism
Encounter for palliative care
Hypothyroidism

## 2022-01-20 NOTE — PROGRESS NOTE ADULT - PROBLEM SELECTOR PROBLEM 3
2019 novel coronavirus disease (COVID-19)
Acute sepsis
Prolonged QT interval
Acute sepsis
Acute sepsis

## 2022-01-20 NOTE — PROGRESS NOTE ADULT - PROBLEM SELECTOR PLAN 7
- F: none  - E: replete K<4, Mg<2  - N: carb consistent  - D: lovenox 40mg q24h  - G: protonix 40mg/d while on steroids    code: full  dispo: COVID RMF
- F: none  - E: replete K<4, Mg<2  - N: carb consistent soft and bite sized  - D: lovenox 40mg q24h  - G: protonix 40mg/d while on steroids    code: full  dispo: COVID RMF
- F: none  - E: replete K<4, Mg<2  - N: carb consistent soft and bite sized  - D: lovenox 40mg q24h  - G: protonix 40mg/d while on steroids    code: full  dispo: COVID RMF
- F: none  - E: replete K<4, Mg<2  - N: carb consistent soft and bite sized  - D: lovenox 40mg q24h  - G: protonix 40mg/d while on steroids    code: full  dispo: COVID RMF --> PT recs DOMENICA but family wants home with home PT
- F: none  - E: replete K<4, Mg<2  - N: carb consistent soft and bite sized  - D: lovenox 40mg q24h  - G: protonix 40mg/d while on steroids    code: full  dispo: COVID RMF
- F: none  - E: replete K<4, Mg<2  - N: carb consistent soft and bite sized  - D: lovenox 40mg q24h  - G: protonix 40mg/d while on steroids    code: full  dispo: COVID RMF
- F: none  - E: replete K<4, Mg<2  - N: carb consistent soft and bite sized  - D: lovenox 40mg q24h  - G: protonix 40mg/d while on steroids    code: full  dispo: COVID RMF --> PT recs [facility]ENICA but family wants home with home PT will discuss w/ SW and family
- F: none  - E: replete K<4, Mg<2  - N: carb consistent soft and bite sized  - D: lovenox 40mg q24h  - G: protonix 40mg/d while on steroids    code: full  dispo: COVID RMF --> PT recs [facility]EN[facility] but family wants home with home PT

## 2022-01-20 NOTE — PROGRESS NOTE ADULT - PROBLEM SELECTOR PROBLEM 2
Prolonged QT interval
Cough
Prolonged QT interval
2019 novel coronavirus disease (COVID-19)
Prolonged QT interval

## 2022-01-20 NOTE — PROGRESS NOTE ADULT - PROBLEM SELECTOR PLAN 2
- EKG on arrival ST without ischemia, QTc 535  - f/u AM EKG  - avoid QTc prolonging agents
.  iso COVID PNA. on 5L nc.   - appreciate pulm recs  - cw guaifenesin q6 PRN cough  - consider adding tessalon Perles 100 PO TID PRN alternating with guaifenesin   - if persistent, consider norah 100 mg PO qHS standing
- EKG on arrival ST without ischemia, QTc 535  - f/u AM EKG  - avoid QTc prolonging agents
- as above  - f/u procal -- defer abx at present  - continue to trend inflammatory markers  - c/w remdesivir x5d (end 1/16)  - c/w decadron 6mg x10d (end 1/20)  - wean O2 as able  - desaturates on sitting at the end of the bed  - will need home O2
- EKG on arrival ST without ischemia, QTc 535  - f/u AM EKG  - avoid QTc prolonging agents
- EKG on arrival ST without ischemia, QTc 535  - f/u AM EKG  - avoid QTc prolonging agents

## 2022-01-20 NOTE — PROGRESS NOTE ADULT - PROBLEM SELECTOR PROBLEM 1
Debility
2019 novel coronavirus disease (COVID-19)
Acute sepsis

## 2022-01-20 NOTE — PROGRESS NOTE ADULT - PROVIDER SPECIALTY LIST ADULT
Rehab Medicine
Rehab Medicine
Internal Medicine
Internal Medicine
Palliative Care
Internal Medicine

## 2022-01-21 ENCOUNTER — TRANSCRIPTION ENCOUNTER (OUTPATIENT)
Age: 87
End: 2022-01-21

## 2022-01-21 VITALS — OXYGEN SATURATION: 93 % | RESPIRATION RATE: 16 BRPM

## 2022-01-21 LAB
ALBUMIN SERPL ELPH-MCNC: 2.8 G/DL — LOW (ref 3.3–5)
ALP SERPL-CCNC: 79 U/L — SIGNIFICANT CHANGE UP (ref 40–120)
ALT FLD-CCNC: 45 U/L — SIGNIFICANT CHANGE UP (ref 10–45)
ANION GAP SERPL CALC-SCNC: 12 MMOL/L — SIGNIFICANT CHANGE UP (ref 5–17)
AST SERPL-CCNC: 33 U/L — SIGNIFICANT CHANGE UP (ref 10–40)
BASOPHILS # BLD AUTO: 0.06 K/UL — SIGNIFICANT CHANGE UP (ref 0–0.2)
BASOPHILS NFR BLD AUTO: 0.4 % — SIGNIFICANT CHANGE UP (ref 0–2)
BILIRUB SERPL-MCNC: 0.4 MG/DL — SIGNIFICANT CHANGE UP (ref 0.2–1.2)
BUN SERPL-MCNC: 15 MG/DL — SIGNIFICANT CHANGE UP (ref 7–23)
CALCIUM SERPL-MCNC: 9 MG/DL — SIGNIFICANT CHANGE UP (ref 8.4–10.5)
CHLORIDE SERPL-SCNC: 101 MMOL/L — SIGNIFICANT CHANGE UP (ref 96–108)
CO2 SERPL-SCNC: 28 MMOL/L — SIGNIFICANT CHANGE UP (ref 22–31)
CREAT SERPL-MCNC: 0.72 MG/DL — SIGNIFICANT CHANGE UP (ref 0.5–1.3)
EOSINOPHIL # BLD AUTO: 0 K/UL — SIGNIFICANT CHANGE UP (ref 0–0.5)
EOSINOPHIL NFR BLD AUTO: 0 % — SIGNIFICANT CHANGE UP (ref 0–6)
GLUCOSE BLDC GLUCOMTR-MCNC: 126 MG/DL — HIGH (ref 70–99)
GLUCOSE BLDC GLUCOMTR-MCNC: 303 MG/DL — HIGH (ref 70–99)
GLUCOSE SERPL-MCNC: 116 MG/DL — HIGH (ref 70–99)
HCT VFR BLD CALC: 42.4 % — SIGNIFICANT CHANGE UP (ref 34.5–45)
HGB BLD-MCNC: 13.2 G/DL — SIGNIFICANT CHANGE UP (ref 11.5–15.5)
IMM GRANULOCYTES NFR BLD AUTO: 1.1 % — SIGNIFICANT CHANGE UP (ref 0–1.5)
LYMPHOCYTES # BLD AUTO: 18.9 % — SIGNIFICANT CHANGE UP (ref 13–44)
LYMPHOCYTES # BLD AUTO: 2.6 K/UL — SIGNIFICANT CHANGE UP (ref 1–3.3)
MAGNESIUM SERPL-MCNC: 2.2 MG/DL — SIGNIFICANT CHANGE UP (ref 1.6–2.6)
MCHC RBC-ENTMCNC: 29.7 PG — SIGNIFICANT CHANGE UP (ref 27–34)
MCHC RBC-ENTMCNC: 31.1 GM/DL — LOW (ref 32–36)
MCV RBC AUTO: 95.5 FL — SIGNIFICANT CHANGE UP (ref 80–100)
MONOCYTES # BLD AUTO: 1.01 K/UL — HIGH (ref 0–0.9)
MONOCYTES NFR BLD AUTO: 7.4 % — SIGNIFICANT CHANGE UP (ref 2–14)
NEUTROPHILS # BLD AUTO: 9.91 K/UL — HIGH (ref 1.8–7.4)
NEUTROPHILS NFR BLD AUTO: 72.2 % — SIGNIFICANT CHANGE UP (ref 43–77)
NRBC # BLD: 0 /100 WBCS — SIGNIFICANT CHANGE UP (ref 0–0)
PHOSPHATE SERPL-MCNC: 3.6 MG/DL — SIGNIFICANT CHANGE UP (ref 2.5–4.5)
PLATELET # BLD AUTO: 323 K/UL — SIGNIFICANT CHANGE UP (ref 150–400)
POTASSIUM SERPL-MCNC: 4.5 MMOL/L — SIGNIFICANT CHANGE UP (ref 3.5–5.3)
POTASSIUM SERPL-SCNC: 4.5 MMOL/L — SIGNIFICANT CHANGE UP (ref 3.5–5.3)
PROT SERPL-MCNC: 6.5 G/DL — SIGNIFICANT CHANGE UP (ref 6–8.3)
RBC # BLD: 4.44 M/UL — SIGNIFICANT CHANGE UP (ref 3.8–5.2)
RBC # FLD: 13.4 % — SIGNIFICANT CHANGE UP (ref 10.3–14.5)
SARS-COV-2 RNA SPEC QL NAA+PROBE: POSITIVE
SODIUM SERPL-SCNC: 141 MMOL/L — SIGNIFICANT CHANGE UP (ref 135–145)
WBC # BLD: 13.73 K/UL — HIGH (ref 3.8–10.5)
WBC # FLD AUTO: 13.73 K/UL — HIGH (ref 3.8–10.5)

## 2022-01-21 PROCEDURE — 83036 HEMOGLOBIN GLYCOSYLATED A1C: CPT

## 2022-01-21 PROCEDURE — 71045 X-RAY EXAM CHEST 1 VIEW: CPT

## 2022-01-21 PROCEDURE — 80053 COMPREHEN METABOLIC PANEL: CPT

## 2022-01-21 PROCEDURE — 86140 C-REACTIVE PROTEIN: CPT

## 2022-01-21 PROCEDURE — 85610 PROTHROMBIN TIME: CPT

## 2022-01-21 PROCEDURE — 85652 RBC SED RATE AUTOMATED: CPT

## 2022-01-21 PROCEDURE — 84466 ASSAY OF TRANSFERRIN: CPT

## 2022-01-21 PROCEDURE — 99239 HOSP IP/OBS DSCHRG MGMT >30: CPT

## 2022-01-21 PROCEDURE — 87635 SARS-COV-2 COVID-19 AMP PRB: CPT

## 2022-01-21 PROCEDURE — 82962 GLUCOSE BLOOD TEST: CPT

## 2022-01-21 PROCEDURE — 97530 THERAPEUTIC ACTIVITIES: CPT

## 2022-01-21 PROCEDURE — 83615 LACTATE (LD) (LDH) ENZYME: CPT

## 2022-01-21 PROCEDURE — 85730 THROMBOPLASTIN TIME PARTIAL: CPT

## 2022-01-21 PROCEDURE — 36415 COLL VENOUS BLD VENIPUNCTURE: CPT

## 2022-01-21 PROCEDURE — 84145 PROCALCITONIN (PCT): CPT

## 2022-01-21 PROCEDURE — 85025 COMPLETE CBC W/AUTO DIFF WBC: CPT

## 2022-01-21 PROCEDURE — 97110 THERAPEUTIC EXERCISES: CPT

## 2022-01-21 PROCEDURE — 97116 GAIT TRAINING THERAPY: CPT

## 2022-01-21 PROCEDURE — 85379 FIBRIN DEGRADATION QUANT: CPT

## 2022-01-21 PROCEDURE — 82746 ASSAY OF FOLIC ACID SERUM: CPT

## 2022-01-21 PROCEDURE — 84443 ASSAY THYROID STIM HORMONE: CPT

## 2022-01-21 PROCEDURE — 97161 PT EVAL LOW COMPLEX 20 MIN: CPT

## 2022-01-21 PROCEDURE — 81001 URINALYSIS AUTO W/SCOPE: CPT

## 2022-01-21 PROCEDURE — 82728 ASSAY OF FERRITIN: CPT

## 2022-01-21 PROCEDURE — 99285 EMERGENCY DEPT VISIT HI MDM: CPT | Mod: 25

## 2022-01-21 PROCEDURE — 83735 ASSAY OF MAGNESIUM: CPT

## 2022-01-21 PROCEDURE — 84100 ASSAY OF PHOSPHORUS: CPT

## 2022-01-21 PROCEDURE — 82607 VITAMIN B-12: CPT

## 2022-01-21 RX ADMIN — Medication 4: at 12:37

## 2022-01-21 RX ADMIN — POLYETHYLENE GLYCOL 3350 17 GRAM(S): 17 POWDER, FOR SOLUTION ORAL at 12:52

## 2022-01-21 RX ADMIN — Medication 6 UNIT(S): at 09:23

## 2022-01-21 RX ADMIN — ENOXAPARIN SODIUM 40 MILLIGRAM(S): 100 INJECTION SUBCUTANEOUS at 05:36

## 2022-01-21 RX ADMIN — Medication 1 TABLET(S): at 12:53

## 2022-01-21 RX ADMIN — Medication 6 UNIT(S): at 12:38

## 2022-01-21 RX ADMIN — PANTOPRAZOLE SODIUM 40 MILLIGRAM(S): 20 TABLET, DELAYED RELEASE ORAL at 05:37

## 2022-01-21 RX ADMIN — Medication 6 MILLIGRAM(S): at 05:36

## 2022-01-21 RX ADMIN — Medication 1 DROP(S): at 05:36

## 2022-01-21 RX ADMIN — Medication 75 MICROGRAM(S): at 06:35

## 2022-01-21 NOTE — DISCHARGE NOTE NURSING/CASE MANAGEMENT/SOCIAL WORK - NSDCPEFALRISK_GEN_ALL_CORE
For information on Fall & Injury Prevention, visit: [email or web address] OR  [email or web address] OR  https://www.cdc.gov/steadi/patient.html

## 2022-01-21 NOTE — DISCHARGE NOTE NURSING/CASE MANAGEMENT/SOCIAL WORK - PATIENT PORTAL LINK FT
You can access the FollowMyHealth Patient Portal offered by [facility] by registering at the following website: [email or web address]. By joining [facility]’s FollowMyHealth portal, you will also be able to view your health information using other applications (apps) compatible with our system.

## 2022-01-26 DIAGNOSIS — J96.01 ACUTE RESPIRATORY FAILURE WITH HYPOXIA: ICD-10-CM

## 2022-01-26 DIAGNOSIS — Z51.5 ENCOUNTER FOR PALLIATIVE CARE: ICD-10-CM

## 2022-01-26 DIAGNOSIS — E78.5 HYPERLIPIDEMIA, UNSPECIFIED: ICD-10-CM

## 2022-01-26 DIAGNOSIS — A41.89 OTHER SPECIFIED SEPSIS: ICD-10-CM

## 2022-01-26 DIAGNOSIS — E11.9 TYPE 2 DIABETES MELLITUS WITHOUT COMPLICATIONS: ICD-10-CM

## 2022-01-26 DIAGNOSIS — E86.0 DEHYDRATION: ICD-10-CM

## 2022-01-26 DIAGNOSIS — U07.1 COVID-19: ICD-10-CM

## 2022-01-26 DIAGNOSIS — J12.82 PNEUMONIA DUE TO CORONAVIRUS DISEASE 2019: ICD-10-CM

## 2022-01-26 DIAGNOSIS — E03.9 HYPOTHYROIDISM, UNSPECIFIED: ICD-10-CM

## 2022-01-26 DIAGNOSIS — R53.81 OTHER MALAISE: ICD-10-CM

## 2022-05-04 PROBLEM — E03.9 HYPOTHYROIDISM, UNSPECIFIED: Chronic | Status: ACTIVE | Noted: 2022-01-11

## 2022-05-04 PROBLEM — E11.9 TYPE 2 DIABETES MELLITUS WITHOUT COMPLICATIONS: Chronic | Status: ACTIVE | Noted: 2022-01-11

## 2022-05-04 PROBLEM — E78.5 HYPERLIPIDEMIA, UNSPECIFIED: Chronic | Status: ACTIVE | Noted: 2022-01-11

## 2022-05-08 ENCOUNTER — FORM ENCOUNTER (OUTPATIENT)
Age: 87
End: 2022-05-08

## 2022-05-11 PROBLEM — Z00.00 ENCOUNTER FOR PREVENTIVE HEALTH EXAMINATION: Status: ACTIVE | Noted: 2022-05-11

## 2022-05-17 PROBLEM — U07.1 COVID-19: Status: RESOLVED | Noted: 2022-05-17 | Resolved: 2022-05-17

## 2022-05-18 ENCOUNTER — TRANSCRIPTION ENCOUNTER (OUTPATIENT)
Age: 87
End: 2022-05-18

## 2022-05-18 ENCOUNTER — APPOINTMENT (OUTPATIENT)
Dept: HOME HEALTH SERVICES | Facility: HOME HEALTH | Age: 87
End: 2022-05-18
Payer: MEDICARE

## 2022-05-18 VITALS
OXYGEN SATURATION: 96 % | TEMPERATURE: 97.1 F | WEIGHT: 147.71 LBS | RESPIRATION RATE: 18 BRPM | DIASTOLIC BLOOD PRESSURE: 70 MMHG | HEART RATE: 77 BPM | HEIGHT: 62 IN | SYSTOLIC BLOOD PRESSURE: 122 MMHG | BODY MASS INDEX: 27.18 KG/M2

## 2022-05-18 DIAGNOSIS — Z71.89 OTHER SPECIFIED COUNSELING: ICD-10-CM

## 2022-05-18 DIAGNOSIS — U07.1 COVID-19: ICD-10-CM

## 2022-05-18 PROCEDURE — 99406 BEHAV CHNG SMOKING 3-10 MIN: CPT

## 2022-05-18 PROCEDURE — 99343: CPT

## 2022-05-18 PROCEDURE — 99497 ADVNCD CARE PLAN 30 MIN: CPT

## 2022-05-18 NOTE — CHRONIC CARE ASSESSMENT
[Limited decision making ability] : limited decision making ability [PPS Score: ____] : Palliative Performance Scale (PPS) Score: [unfilled] [FAST Score: ____] : Functional Assessment Scale (FAST) Score: [unfilled] [de-identified] : as tolerated [de-identified] : diabetic diet

## 2022-05-18 NOTE — REASON FOR VISIT
[Initial Evaluation] : an initial evaluation [Family Member] : family member [Formal Caregiver] : formal caregiver [Pre-Visit Preparation] : pre-visit preparation was done [FreeTextEntry1] : type II DM, hypothyroidism, HLD, and s/p Pfizer x2 who p/w ERICKSON, decreased PO intake, and hypoxia x3d. Found to be COVID+ with BL infiltrates on CXR. Admitted to [facility] 5/11/22. [FreeTextEntry2] : AUBREY

## 2022-05-18 NOTE — HEALTH RISK ASSESSMENT
[HRA Reviewed] : Health risk assessment reviewed [Full assistance needed] : managing finances [No falls in past year] : Patient reported no falls in the past year [Yes] : The patient does have visual impairment [Independent] : feeding [Some assistance needed] : using telephone [TimeGetUpGo] : 0

## 2022-05-18 NOTE — COUNSELING
[Normal Weight - ( BMI  <25 )] : normal weight - ( BMI  <25 ) [Mediterranean diet recommended] : Mediterranean diet recommended [Non - Smoker] : non-smoker [Use assistive device to avoid falls] : use assistive device to avoid falls [___] : [unfilled] [] : foot exam [Patient not on disease-modifying anti-rheumatic drug due to overall prognosis] : Patient not on disease-modifying anti-rheumatic drug due to overall prognosis [Not Recommended] : Aspirin use not recommended due to overall prognosis [Decrease hospital use] : decrease hospital use [Advanced Directives discussed: ____] : Advanced directives discussed: [unfilled] [Full Code] : Code Status: Full Code [No Limitations] : Treatment Guidelines: No limitations [Trial of Intubation] : Intubation: Trial of Intubation [03918 - Tobacco Use Cessation Intermediate Greater Than 3 Minutes Up to 10 Minutes] : Tobacco Use Cessation Intermediate Greater Than 3 Minutes Up to 10 Minutes [FreeTextEntry4] : Educated patient/legal representative about CCM services and consent.\par Educated patient/legal representative that this service is available because they have 2 or more chronic conditions, that only one Provider can furnish CCM Services per calendar month and that CCM services are subject to the usual Medicare deductible and coinsurance. \par Patient/legal representative understands the right to stop the CCM services at any time by notifying House Calls office.\par Patient/legal representative has verbally consented 5/2022\par \par

## 2022-05-20 ENCOUNTER — NON-APPOINTMENT (OUTPATIENT)
Age: 87
End: 2022-05-20

## 2022-05-20 ENCOUNTER — LABORATORY RESULT (OUTPATIENT)
Age: 87
End: 2022-05-20

## 2022-05-23 ENCOUNTER — OUTPATIENT (OUTPATIENT)
Dept: OUTPATIENT SERVICES | Facility: HOSPITAL | Age: 87
LOS: 1 days | End: 2022-05-23
Payer: MEDICARE

## 2022-05-23 ENCOUNTER — APPOINTMENT (OUTPATIENT)
Dept: PULMONOLOGY | Facility: CLINIC | Age: 87
End: 2022-05-23
Payer: MEDICARE

## 2022-05-23 VITALS
BODY MASS INDEX: 27.18 KG/M2 | DIASTOLIC BLOOD PRESSURE: 63 MMHG | TEMPERATURE: 97.6 F | SYSTOLIC BLOOD PRESSURE: 115 MMHG | WEIGHT: 147.71 LBS | OXYGEN SATURATION: 95 % | HEIGHT: 62 IN | HEART RATE: 96 BPM

## 2022-05-23 DIAGNOSIS — Z86.16 PERSONAL HISTORY OF COVID-19: ICD-10-CM

## 2022-05-23 PROCEDURE — 99204 OFFICE O/P NEW MOD 45 MIN: CPT

## 2022-05-23 PROCEDURE — 71046 X-RAY EXAM CHEST 2 VIEWS: CPT | Mod: 26

## 2022-05-23 NOTE — PHYSICAL EXAM
[No Acute Distress] : no acute distress [Well Nourished] : well nourished [Normal Oropharynx] : normal oropharynx [II] : Mallampati Class: II [Normal Appearance] : normal appearance [No JVD] : no jvd [Normal Rate/Rhythm] : normal rate/rhythm [Normal S1, S2] : normal s1, s2 [No Resp Distress] : no resp distress [Clear to Auscultation Bilaterally] : clear to auscultation bilaterally [Benign] : benign [Not Tender] : not tender [Normal Gait] : normal gait [Gait - Sufficient For Exercise Testing] : gait sufficient for exercise testing [No Clubbing] : no clubbing [No Cyanosis] : no cyanosis [Normal Color/ Pigmentation] : normal color/ pigmentation [No Rash] : no rash [No Focal Deficits] : no focal deficits [No Sensory Deficits] : no sensory deficits [Oriented x3] : oriented x3 [Normal Affect] : normal affect

## 2022-05-23 NOTE — HISTORY OF PRESENT ILLNESS
[TextBox_4] : 89 year old female with h/o DM, hypothyroidism was admitted to hospital in Jan 2022 for COVID pneumonia. Patient was treated with Remdesivir and dexamethasone (admitted for 10 days). Patient was sent to Rehab on 4 l/min oxygen. She stayed in Rehab from Jan 2022 to middle of May 2022. \par Today, patient came for post hospital follow up. Patient is c/o weakness while walking. Patient is denying SOB on exertion, chest pain or pedal edema. Patient came with daughter, who helped in translation.  [ESS] : 4

## 2022-05-23 NOTE — REVIEW OF SYSTEMS
[Fever] : no fever [Chills] : no chills [Dry Eyes] : no dry eyes [Eye Irritation] : no eye irritation [Cough] : no cough [Sputum] : no sputum [Chest Discomfort] : no chest discomfort [Orthopnea] : no orthopnea [Hay Fever] : no hay fever [Nasal Discharge] : no nasal discharge [GERD] : no gerd [Diarrhea] : no diarrhea [Arthralgias] : no arthralgias [Trauma/ Injury] : no trauma/ injury [Raynaud] : no raynaud [Telangiectasias] : no telangiectasias [Anemia] : no anemia [History of Iron Deficiency] : no history of iron deficiency [Headache] : no headache [Dizziness] : no dizziness [Depression] : no depression [Diabetes] : no diabetes

## 2022-05-23 NOTE — DISCUSSION/SUMMARY
[FreeTextEntry1] : 89 year old female with h/o DM, hypothyroidism was admitted to hospital in Jan 2022 for COVID pneumonia. Patient was treated with Remdesivir and dexamethasone (admitted for 10 days). Patient was sent to Rehab on 4 l/min oxygen. She stayed in Rehab from Jan 2022 to middle of May 2022. \par \par Review:\par -Hospital discharge note\par - CXR (1/22): Bilateral lower lobe consolidation with Left upper lobe infiltrate. \par - Labs (5/22): No eosinophilia.\par \par A/P\par Oxygen saturation at rest was 96% today. Her oxygen saturation improved to 99% with minimal hyperventilation. Chest examination did not reveal any abnormality. Clinically, unlikely to have residual fibrosis after COVID. Plan for repeat CXR. If CXR shows resolution of infiltrate then patient does not need further follow up with pulmonary. Patient is aware to continue follow up with PCP.

## 2022-05-24 ENCOUNTER — TRANSCRIPTION ENCOUNTER (OUTPATIENT)
Age: 87
End: 2022-05-24

## 2022-05-28 ENCOUNTER — NON-APPOINTMENT (OUTPATIENT)
Age: 87
End: 2022-05-28

## 2022-06-15 ENCOUNTER — APPOINTMENT (OUTPATIENT)
Dept: HOME HEALTH SERVICES | Facility: HOME HEALTH | Age: 87
End: 2022-06-15
Payer: MEDICARE

## 2022-06-15 VITALS
OXYGEN SATURATION: 97 % | DIASTOLIC BLOOD PRESSURE: 70 MMHG | HEART RATE: 76 BPM | SYSTOLIC BLOOD PRESSURE: 120 MMHG | TEMPERATURE: 97 F | RESPIRATION RATE: 18 BRPM

## 2022-06-15 PROCEDURE — 69210 REMOVE IMPACTED EAR WAX UNI: CPT

## 2022-06-15 PROCEDURE — 99349 HOME/RES VST EST MOD MDM 40: CPT | Mod: 25

## 2022-06-15 NOTE — COUNSELING
[Normal Weight - ( BMI  <25 )] : normal weight - ( BMI  <25 ) [Mediterranean diet recommended] : Mediterranean diet recommended [Non - Smoker] : non-smoker [Use assistive device to avoid falls] : use assistive device to avoid falls [___] : [unfilled] [] : foot exam [Not Recommended] : Aspirin use not recommended due to overall prognosis [Patient not on disease-modifying anti-rheumatic drug due to overall prognosis] : Patient not on disease-modifying anti-rheumatic drug due to overall prognosis [Decrease hospital use] : decrease hospital use [Advanced Directives discussed: ____] : Advanced directives discussed: [unfilled] [Full Code] : Code Status: Full Code [No Limitations] : Treatment Guidelines: No limitations [Trial of Intubation] : Intubation: Trial of Intubation [FreeTextEntry4] : Educated patient/legal representative about CCM services and consent.\par Educated patient/legal representative that this service is available because they have 2 or more chronic conditions, that only one Provider can furnish CCM Services per calendar month and that CCM services are subject to the usual Medicare deductible and coinsurance. \par Patient/legal representative understands the right to stop the CCM services at any time by notifying House Calls office.\par Patient/legal representative has verbally consented 5/2022\par \par

## 2022-06-15 NOTE — PHYSICAL EXAM
[No Acute Distress] : no acute distress [Well Nourished] : well nourished [Well Developed] : well developed [Normal Sclera/Conjunctiva] : normal sclera/conjunctiva [EOMI] : extra ocular movement intact [Normal Outer Ear/Nose] : the ears and nose were normal in appearance [Normal Oropharynx] : the oropharynx was normal [Clear to Auscultation] : lungs were clear to auscultation bilaterally [No Respiratory Distress] : no respiratory distress [No Accessory Muscle Use] : no accessory muscle use [Normal Rate] : heart rate was normal  [Regular Rhythm] : with a regular rhythm [Normal S1, S2] : normal S1 and S2 [No Murmurs] : no murmurs heard [No Edema] : there was no peripheral edema [Normal Bowel Sounds] : normal bowel sounds [Non Tender] : non-tender [Soft] : abdomen soft [Not Distended] : not distended [Normal Post Cervical Nodes] : no posterior cervical lymphadenopathy [Normal Anterior Cervical Nodes] : no anterior cervical lymphadenopathy [No CVA Tenderness] : no ~M costovertebral angle tenderness [No Spinal Tenderness] : no spinal tenderness [Normal Gait] : normal gait [Normal Strength/Tone] : muscle strength and tone were normal [No Rash] : no rash [Oriented x3] : oriented to person, place, and time [Normal Affect] : the affect was normal [de-identified] : impacted cerumen both ears

## 2022-06-15 NOTE — HISTORY OF PRESENT ILLNESS
[Family Member] : family member [Formal Caregiver] : formal caregiver [FreeTextEntry1] : type II DM, hypothyroidism, HLD, and s/p Pfizer x2 who p/w ERICKSON, decreased PO intake, and hypoxia x3d. Found to be COVID+ with BL infiltrates on CXR. Admitted to [facility] 5/11/22. [FreeTextEntry2] : 89 yo F with type II DM, hypothyroidism, hyperlipidemia, and s/p Pfizer x2 who p/w ERICKSON, decreased PO intake, and hypoxia x3d. Found to be COVID+ with BL infiltrates on CXR. Admitted to [facility] 1/12/22.  Daughter requesting PT/OT evaluation and referral to pulmonary to follow up on COVID.  Tajik speaking only.  Daughter translated in English- patient lives with her daughter.  Reports impacted ears.  \par \par Patient denies fever, cough, trouble breathing, rash, vomiting and diarrhea. Patient has not been in close contact with someone covid positive.\par \par N95 mask, gloves, eye wear and gown used during visit: [Y]. Total face to face time with patient is 60 min.\par \par Patient/ patient's caregiver reports no weight loss >10lbs in the past 6 months. No changes in dentition or swallowing reported, No changes in hearing or vision reported. No changes in Cognition reported. Patient denies any symptoms of depression or anxiety. Patient is ADL and IADL dependent. No changes in gait or falls reported. \par Patient's home environment is safe.\par \par \par \par \par \par \par \par \par \par \par \par

## 2022-06-15 NOTE — HEALTH RISK ASSESSMENT
[HRA Reviewed] : Health risk assessment reviewed [Independent] : feeding [Some assistance needed] : using telephone [Full assistance needed] : managing finances [No falls in past year] : Patient reported no falls in the past year [Yes] : The patient does have visual impairment [TimeGetUpGo] : 0

## 2022-06-15 NOTE — CHRONIC CARE ASSESSMENT
[PPS Score: ____] : Palliative Performance Scale (PPS) Score: [unfilled] [FAST Score: ____] : Functional Assessment Scale (FAST) Score: [unfilled] [Limited decision making ability] : limited decision making ability [de-identified] : as tolerated [de-identified] : diabetic diet

## 2022-06-15 NOTE — REASON FOR VISIT
[Initial Evaluation] : an initial evaluation [Family Member] : family member [Formal Caregiver] : formal caregiver [Pre-Visit Preparation] : pre-visit preparation was done [FreeTextEntry1] : type II DM, hypothyroidism, HLD, and s/p Pfizer x2 who p/w ERICKSON, decreased PO intake, and hypoxia x3d. Found to be COVID+ with BL infiltrates on CXR. Admitted to [facility] 5/11/22. [FreeTextEntry2] : AUBREY

## 2022-06-27 ENCOUNTER — APPOINTMENT (OUTPATIENT)
Dept: HOME HEALTH SERVICES | Facility: HOME HEALTH | Age: 87
End: 2022-06-27

## 2022-07-23 ENCOUNTER — TRANSCRIPTION ENCOUNTER (OUTPATIENT)
Age: 87
End: 2022-07-23

## 2022-08-01 ENCOUNTER — APPOINTMENT (OUTPATIENT)
Dept: HOME HEALTH SERVICES | Facility: HOME HEALTH | Age: 87
End: 2022-08-01

## 2022-08-01 VITALS
RESPIRATION RATE: 18 BRPM | TEMPERATURE: 97 F | OXYGEN SATURATION: 97 % | HEART RATE: 72 BPM | SYSTOLIC BLOOD PRESSURE: 122 MMHG | DIASTOLIC BLOOD PRESSURE: 70 MMHG

## 2022-08-01 PROCEDURE — 99349 HOME/RES VST EST MOD MDM 40: CPT

## 2022-08-01 NOTE — CHRONIC CARE ASSESSMENT
[PPS Score: ____] : Palliative Performance Scale (PPS) Score: [unfilled] [FAST Score: ____] : Functional Assessment Scale (FAST) Score: [unfilled] [Limited decision making ability] : limited decision making ability [de-identified] : as tolerated [de-identified] : diabetic diet

## 2022-08-01 NOTE — PHYSICAL EXAM
[No Acute Distress] : no acute distress [Well Nourished] : well nourished [Well Developed] : well developed [Normal Sclera/Conjunctiva] : normal sclera/conjunctiva [EOMI] : extra ocular movement intact [Normal Outer Ear/Nose] : the ears and nose were normal in appearance [Normal Oropharynx] : the oropharynx was normal [No Respiratory Distress] : no respiratory distress [Clear to Auscultation] : lungs were clear to auscultation bilaterally [No Accessory Muscle Use] : no accessory muscle use [Normal Rate] : heart rate was normal  [Regular Rhythm] : with a regular rhythm [Normal S1, S2] : normal S1 and S2 [No Murmurs] : no murmurs heard [No Edema] : there was no peripheral edema [Normal Bowel Sounds] : normal bowel sounds [Non Tender] : non-tender [Soft] : abdomen soft [Not Distended] : not distended [Normal Post Cervical Nodes] : no posterior cervical lymphadenopathy [Normal Anterior Cervical Nodes] : no anterior cervical lymphadenopathy [No CVA Tenderness] : no ~M costovertebral angle tenderness [No Spinal Tenderness] : no spinal tenderness [Normal Gait] : normal gait [Normal Strength/Tone] : muscle strength and tone were normal [No Rash] : no rash [Oriented x3] : oriented to person, place, and time [Normal Affect] : the affect was normal [de-identified] : impacted cerumen both ears

## 2022-08-01 NOTE — HISTORY OF PRESENT ILLNESS
[Family Member] : family member [Formal Caregiver] : formal caregiver [FreeTextEntry1] : type II DM, hypothyroidism, HLD, and s/p Pfizer x2 who p/w ERICKSON, decreased PO intake, and hypoxia x3d. Found to be COVID+ with BL infiltrates on CXR. Admitted to [facility] 5/11/22. [FreeTextEntry2] : 89 yo F with type II DM, hypothyroidism, hyperlipidemia, and s/p Pfizer x2 who p/w ERICKSON, decreased PO intake, and hypoxia x3d. Found to be COVID+ with BL infiltrates on CXR. Admitted to [facility] 1/12/22.  Daughter requesting PT/OT evaluation and referral to pulmonary to follow up on COVID.  Latvian speaking only.  Daughter translated in English- patient lives with her daughter.  Reports impacted ears.  Ringing in the ear and vision changes will provide family with referrals.  \par \par Patient denies fever, cough, trouble breathing, rash, vomiting and diarrhea. Patient has not been in close contact with someone covid positive.\par \par N95 mask, gloves, eye wear and gown used during visit: [Y]. Total face to face time with patient is 60 min.\par \par Patient/ patient's caregiver reports no weight loss >10lbs in the past 6 months. No changes in dentition or swallowing reported, No changes in hearing or vision reported. No changes in Cognition reported. Patient denies any symptoms of depression or anxiety. Patient is ADL and IADL dependent. No changes in gait or falls reported. \par Patient's home environment is safe.\par \par \par \par \par \par \par \par \par \par \par \par

## 2022-08-01 NOTE — REASON FOR VISIT
[Initial Evaluation] : an initial evaluation [Family Member] : family member [Formal Caregiver] : formal caregiver [Pre-Visit Preparation] : pre-visit preparation was done [FreeTextEntry1] : type II DM, hypothyroidism, HLD, and s/p Pfizer x2 who p/w ERICKSON, decreased PO intake, and hypoxia x3d. Found to be COVID+ with BL infiltrates on CXR. Admitted to [facility] 5/11/22. [FreeTextEntry2] : AUBREY

## 2022-08-01 NOTE — COUNSELING
[Normal Weight - ( BMI  <25 )] : normal weight - ( BMI  <25 ) [Mediterranean diet recommended] : Mediterranean diet recommended [Non - Smoker] : non-smoker [Use assistive device to avoid falls] : use assistive device to avoid falls [___] : [unfilled] [] : foot exam [Patient not on disease-modifying anti-rheumatic drug due to overall prognosis] : Patient not on disease-modifying anti-rheumatic drug due to overall prognosis [Not Recommended] : Aspirin use not recommended due to overall prognosis [Decrease hospital use] : decrease hospital use [Advanced Directives discussed: ____] : Advanced directives discussed: [unfilled] [Full Code] : Code Status: Full Code [No Limitations] : Treatment Guidelines: No limitations [Trial of Intubation] : Intubation: Trial of Intubation [FreeTextEntry4] : Educated patient/legal representative about CCM services and consent.\par Educated patient/legal representative that this service is available because they have 2 or more chronic conditions, that only one Provider can furnish CCM Services per calendar month and that CCM services are subject to the usual Medicare deductible and coinsurance. \par Patient/legal representative understands the right to stop the CCM services at any time by notifying [facility] office.\par Patient/legal representative has verbally consented 5/2022\par \par

## 2022-09-08 ENCOUNTER — APPOINTMENT (OUTPATIENT)
Dept: OTOLARYNGOLOGY | Facility: CLINIC | Age: 87
End: 2022-09-08

## 2022-09-08 VITALS — WEIGHT: 147.71 LBS | BODY MASS INDEX: 27.18 KG/M2 | HEIGHT: 62 IN

## 2022-09-08 DIAGNOSIS — H61.23 IMPACTED CERUMEN, BILATERAL: ICD-10-CM

## 2022-09-08 PROCEDURE — 99203 OFFICE O/P NEW LOW 30 MIN: CPT | Mod: 25

## 2022-09-08 PROCEDURE — G0268 REMOVAL OF IMPACTED WAX MD: CPT

## 2022-09-08 PROCEDURE — 92557 COMPREHENSIVE HEARING TEST: CPT

## 2022-09-08 PROCEDURE — 92567 TYMPANOMETRY: CPT

## 2022-09-08 NOTE — DATA REVIEWED
[de-identified] : After debridement, in light of the patients current symptoms, Complete audiometry was ordered and completed today. I have interpreted these results and reviewed them in detail with the patient.\par \par Bilateral sensorineural hearing loss.

## 2022-09-08 NOTE — CONSULT LETTER
[Please see my note below.] : Please see my note below. [FreeTextEntry2] : Dear GILES STARK  [FreeTextEntry1] : Thank you for allowing me to participate in the care of CHARLIE ARNOLD .\par Please see the attached visit note.\par \par \par \par Darshan Seay\par Otology\par Medical Director of [facility]\par Department of Otolaryngology\par [facility]

## 2022-09-08 NOTE — PHYSICAL EXAM
[Midline] : trachea located in midline position [Normal] : no rashes [FreeTextEntry1] : Microscopic ear exam with cerumen debridement:\par \par Right ear: Obstructing cerumen was debrided from the ear canal using suction, and curet.  The ear canal was otherwise within normal limits.  The tympanic membrane was intact and noninflamed.\par \par Left ear: Obstructing cerumen was debrided from the ear canal using suction, and curets.  The ear canal was otherwise within normal limits.  The tympanic membrane was intact and noninflamed.

## 2022-09-08 NOTE — HISTORY OF PRESENT ILLNESS
[de-identified] : CHARLIE ARNOLD has a history of hearing loss and tinnitus in both ears.  This has been progressing over the past 2 years approximately.  This may have been exacerbated by a COVID infection that occurred in 2020 with prolonged hospitalization.  She also reports a head injury in 2015 which resulted in positional vertigo and was treated with physical therapy.

## 2022-09-11 PROBLEM — B36.9 FUNGAL DERMATITIS: Status: ACTIVE | Noted: 2022-05-28

## 2022-09-12 ENCOUNTER — APPOINTMENT (OUTPATIENT)
Dept: HOME HEALTH SERVICES | Facility: HOME HEALTH | Age: 87
End: 2022-09-12

## 2022-09-12 VITALS
OXYGEN SATURATION: 97 % | TEMPERATURE: 97.3 F | SYSTOLIC BLOOD PRESSURE: 120 MMHG | DIASTOLIC BLOOD PRESSURE: 70 MMHG | RESPIRATION RATE: 18 BRPM | HEART RATE: 70 BPM

## 2022-09-12 DIAGNOSIS — B36.9 SUPERFICIAL MYCOSIS, UNSPECIFIED: ICD-10-CM

## 2022-09-12 DIAGNOSIS — D63.8 ANEMIA IN OTHER CHRONIC DISEASES CLASSIFIED ELSEWHERE: ICD-10-CM

## 2022-09-12 PROCEDURE — 99349 HOME/RES VST EST MOD MDM 40: CPT

## 2022-09-12 NOTE — REASON FOR VISIT
[Family Member] : family member [Formal Caregiver] : formal caregiver [Pre-Visit Preparation] : pre-visit preparation was done [Follow-Up] : a follow-up visit [FreeTextEntry1] : type II DM, hypothyroidism, HLD, and s/p Pfizer x2 who p/w ERICKSON, decreased PO intake, and hypoxia x3d. Found to be COVID+ with BL infiltrates on CXR. Admitted to [facility] 5/11/22. [FreeTextEntry2] : AUBREY

## 2022-09-12 NOTE — CHRONIC CARE ASSESSMENT
[PPS Score: ____] : Palliative Performance Scale (PPS) Score: [unfilled] [FAST Score: ____] : Functional Assessment Scale (FAST) Score: [unfilled] [Limited decision making ability] : limited decision making ability [de-identified] : as tolerated [de-identified] : diabetic diet

## 2022-09-12 NOTE — PHYSICAL EXAM
[No Acute Distress] : no acute distress [Well Nourished] : well nourished [Well Developed] : well developed [Normal Sclera/Conjunctiva] : normal sclera/conjunctiva [EOMI] : extra ocular movement intact [Normal Outer Ear/Nose] : the ears and nose were normal in appearance [Normal Oropharynx] : the oropharynx was normal [No Respiratory Distress] : no respiratory distress [Clear to Auscultation] : lungs were clear to auscultation bilaterally [No Accessory Muscle Use] : no accessory muscle use [Normal Rate] : heart rate was normal  [Regular Rhythm] : with a regular rhythm [Normal S1, S2] : normal S1 and S2 [No Murmurs] : no murmurs heard [No Edema] : there was no peripheral edema [Normal Bowel Sounds] : normal bowel sounds [Non Tender] : non-tender [Soft] : abdomen soft [Not Distended] : not distended [Normal Post Cervical Nodes] : no posterior cervical lymphadenopathy [Normal Anterior Cervical Nodes] : no anterior cervical lymphadenopathy [No CVA Tenderness] : no ~M costovertebral angle tenderness [No Spinal Tenderness] : no spinal tenderness [Normal Gait] : normal gait [Normal Strength/Tone] : muscle strength and tone were normal [No Rash] : no rash [Oriented x3] : oriented to person, place, and time [Normal Affect] : the affect was normal [de-identified] : impacted cerumen both ears

## 2022-09-12 NOTE — HISTORY OF PRESENT ILLNESS
[Family Member] : family member [Formal Caregiver] : formal caregiver [FreeTextEntry1] : type II DM, hypothyroidism, HLD, and s/p Pfizer x2 who p/w ERICKSON, decreased PO intake, and hypoxia x3d. Found to be COVID+ with BL infiltrates on CXR. Admitted to [facility] 5/11/22. [FreeTextEntry2] : 89 yo F with type II DM, hypothyroidism, hyperlipidemia, and s/p Pfizer x2 who p/w ERICKSON, decreased PO intake, and hypoxia x3d.  Follow up on chronic medical issues.  Reports weakness and fatigue.  Will order labs.  Receiving physical therapy. \par \par Patient denies fever, cough, trouble breathing, rash, vomiting and diarrhea. Patient has not been in close contact with someone covid positive.\par \par N95 mask, gloves, eye wear and gown used during visit: [Y]. Total face to face time with patient is 60 min.\par \par Patient/ patient's caregiver reports no weight loss >10lbs in the past 6 months. No changes in dentition or swallowing reported, No changes in hearing or vision reported. No changes in Cognition reported. Patient denies any symptoms of depression or anxiety. Patient is ADL and IADL dependent. No changes in gait or falls reported. \par Patient's home environment is safe.\par \par \par \par \par \par \par \par \par \par \par \par

## 2022-09-12 NOTE — HEALTH RISK ASSESSMENT
[HRA Reviewed] : Health risk assessment reviewed [Independent] : feeding [Some assistance needed] : using telephone [Full assistance needed] : managing finances [Yes] : The patient does have visual impairment [No falls in past year] : Patient reported no falls in the past year [TimeGetUpGo] : 0

## 2022-09-13 ENCOUNTER — LABORATORY RESULT (OUTPATIENT)
Age: 87
End: 2022-09-13

## 2022-09-19 ENCOUNTER — FORM ENCOUNTER (OUTPATIENT)
Age: 87
End: 2022-09-19

## 2022-09-23 ENCOUNTER — APPOINTMENT (OUTPATIENT)
Dept: OPHTHALMOLOGY | Facility: CLINIC | Age: 87
End: 2022-09-23

## 2022-09-23 ENCOUNTER — NON-APPOINTMENT (OUTPATIENT)
Age: 87
End: 2022-09-23

## 2022-09-23 PROCEDURE — 92250 FUNDUS PHOTOGRAPHY W/I&R: CPT

## 2022-09-23 PROCEDURE — 92004 COMPRE OPH EXAM NEW PT 1/>: CPT

## 2022-09-30 ENCOUNTER — APPOINTMENT (OUTPATIENT)
Dept: OTOLARYNGOLOGY | Facility: CLINIC | Age: 87
End: 2022-09-30

## 2022-09-30 PROCEDURE — V5010 ASSESSMENT FOR HEARING AID: CPT | Mod: GY

## 2022-10-05 ENCOUNTER — TRANSCRIPTION ENCOUNTER (OUTPATIENT)
Age: 87
End: 2022-10-05

## 2022-10-06 ENCOUNTER — TRANSCRIPTION ENCOUNTER (OUTPATIENT)
Age: 87
End: 2022-10-06

## 2022-10-06 ENCOUNTER — NON-APPOINTMENT (OUTPATIENT)
Age: 87
End: 2022-10-06

## 2022-10-09 ENCOUNTER — NON-APPOINTMENT (OUTPATIENT)
Age: 87
End: 2022-10-09

## 2022-10-10 ENCOUNTER — TRANSCRIPTION ENCOUNTER (OUTPATIENT)
Age: 87
End: 2022-10-10

## 2022-10-10 ENCOUNTER — NON-APPOINTMENT (OUTPATIENT)
Age: 87
End: 2022-10-10

## 2022-10-12 ENCOUNTER — APPOINTMENT (OUTPATIENT)
Dept: HOME HEALTH SERVICES | Facility: HOME HEALTH | Age: 87
End: 2022-10-12

## 2022-10-12 VITALS
OXYGEN SATURATION: 96 % | SYSTOLIC BLOOD PRESSURE: 120 MMHG | TEMPERATURE: 97 F | HEART RATE: 70 BPM | RESPIRATION RATE: 18 BRPM | DIASTOLIC BLOOD PRESSURE: 70 MMHG

## 2022-10-12 DIAGNOSIS — Z23 ENCOUNTER FOR IMMUNIZATION: ICD-10-CM

## 2022-10-12 PROCEDURE — 99349 HOME/RES VST EST MOD MDM 40: CPT

## 2022-10-12 RX ORDER — SENNA 8.6 MG/1
8.6 TABLET, FILM COATED ORAL
Qty: 90 | Refills: 3 | Status: ACTIVE | COMMUNITY
Start: 2022-10-12 | End: 1900-01-01
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
## 2022-10-12 NOTE — REASON FOR VISIT
[Follow-Up] : a follow-up visit [Family Member] : family member [Formal Caregiver] : formal caregiver [Pre-Visit Preparation] : pre-visit preparation was done [FreeTextEntry1] : type II DM, hypothyroidism, HLD, and s/p Pfizer x2 who p/w ERICKSON, decreased PO intake, and hypoxia x3d. Found to be COVID+ with BL infiltrates on CXR. Admitted to [facility] 5/11/22. [FreeTextEntry2] : AUBREY

## 2022-10-12 NOTE — PHYSICAL EXAM
[No Acute Distress] : no acute distress [Well Nourished] : well nourished [Well Developed] : well developed [Normal Sclera/Conjunctiva] : normal sclera/conjunctiva [EOMI] : extra ocular movement intact [Normal Outer Ear/Nose] : the ears and nose were normal in appearance [Normal Oropharynx] : the oropharynx was normal [No Respiratory Distress] : no respiratory distress [Clear to Auscultation] : lungs were clear to auscultation bilaterally [No Accessory Muscle Use] : no accessory muscle use [Normal Rate] : heart rate was normal  [Regular Rhythm] : with a regular rhythm [Normal S1, S2] : normal S1 and S2 [No Murmurs] : no murmurs heard [No Edema] : there was no peripheral edema [Normal Bowel Sounds] : normal bowel sounds [Non Tender] : non-tender [Soft] : abdomen soft [Not Distended] : not distended [Normal Post Cervical Nodes] : no posterior cervical lymphadenopathy [Normal Anterior Cervical Nodes] : no anterior cervical lymphadenopathy [No CVA Tenderness] : no ~M costovertebral angle tenderness [No Spinal Tenderness] : no spinal tenderness [Normal Gait] : normal gait [Normal Strength/Tone] : muscle strength and tone were normal [No Rash] : no rash [Oriented x3] : oriented to person, place, and time [Normal Affect] : the affect was normal [de-identified] : impacted cerumen both ears

## 2022-10-12 NOTE — CHRONIC CARE ASSESSMENT
[Limited decision making ability] : limited decision making ability [PPS Score: ____] : Palliative Performance Scale (PPS) Score: [unfilled] [FAST Score: ____] : Functional Assessment Scale (FAST) Score: [unfilled] [de-identified] : as tolerated [de-identified] : diabetic diet

## 2022-11-03 ENCOUNTER — TRANSCRIPTION ENCOUNTER (OUTPATIENT)
Age: 87
End: 2022-11-03

## 2022-11-04 ENCOUNTER — NON-APPOINTMENT (OUTPATIENT)
Age: 87
End: 2022-11-04

## 2022-11-04 ENCOUNTER — LABORATORY RESULT (OUTPATIENT)
Age: 87
End: 2022-11-04

## 2022-11-05 ENCOUNTER — NON-APPOINTMENT (OUTPATIENT)
Age: 87
End: 2022-11-05

## 2022-11-07 ENCOUNTER — NON-APPOINTMENT (OUTPATIENT)
Age: 87
End: 2022-11-07

## 2022-11-14 ENCOUNTER — APPOINTMENT (OUTPATIENT)
Dept: HOME HEALTH SERVICES | Facility: HOME HEALTH | Age: 87
End: 2022-11-14

## 2022-11-14 VITALS
TEMPERATURE: 97.2 F | HEART RATE: 70 BPM | DIASTOLIC BLOOD PRESSURE: 70 MMHG | SYSTOLIC BLOOD PRESSURE: 122 MMHG | OXYGEN SATURATION: 95 % | RESPIRATION RATE: 18 BRPM

## 2022-11-14 PROCEDURE — 99349 HOME/RES VST EST MOD MDM 40: CPT

## 2022-11-14 NOTE — HISTORY OF PRESENT ILLNESS
[Family Member] : family member [Formal Caregiver] : formal caregiver [FreeTextEntry1] : type II DM, hypothyroidism, HLD, and s/p Pfizer x2 who p/w ERICKSON, decreased PO intake, and hypoxia x3d. Found to be COVID+ with BL infiltrates on CXR. Admitted to [facility] 5/11/22. [FreeTextEntry2] : 89 yo F with type II DM, hypothyroidism, hyperlipidemia, and s/p Pfizer x2 who p/w ERICKSON, decreased PO intake, and hypoxia x3d.  Follow up on chronic medical issues.  States she is feeling better- fell and broke a rib.  Denies pain.  Requesting podiatry- re-order evaluation\par \par Patient denies fever, cough, trouble breathing, rash, vomiting and diarrhea. Patient has not been in close contact with someone covid positive.\par \par N95 mask, gloves, eye wear and gown used during visit: [Y]. Total face to face time with patient is 60 min.\par \par Patient/ patient's caregiver reports no weight loss >10lbs in the past 6 months. No changes in dentition or swallowing reported, No changes in hearing or vision reported. No changes in Cognition reported. Patient denies any symptoms of depression or anxiety. Patient is ADL and IADL dependent. No changes in gait or falls reported. \par Patient's home environment is safe.\par \par \par \par \par \par \par \par \par \par \par \par

## 2022-11-14 NOTE — CHRONIC CARE ASSESSMENT
[PPS Score: ____] : Palliative Performance Scale (PPS) Score: [unfilled] [FAST Score: ____] : Functional Assessment Scale (FAST) Score: [unfilled] [Limited decision making ability] : limited decision making ability [de-identified] : as tolerated [de-identified] : diabetic diet

## 2022-11-14 NOTE — REASON FOR VISIT
[Follow-Up] : a follow-up visit [Family Member] : family member [Formal Caregiver] : formal caregiver [Pre-Visit Preparation] : pre-visit preparation was done [FreeTextEntry1] : type II DM, hypothyroidism, HLD, and s/p Pfizer x2 who p/w ERICKSON, decreased PO intake, and hypoxia x3d. Found to be COVID+ with BL infiltrates on CXR. Admitted to [facility] 5/11/22. [FreeTextEntry2] : AUBREY

## 2022-11-14 NOTE — PHYSICAL EXAM
[No Acute Distress] : no acute distress [Well Nourished] : well nourished [Well Developed] : well developed [Normal Sclera/Conjunctiva] : normal sclera/conjunctiva [EOMI] : extra ocular movement intact [Normal Oropharynx] : the oropharynx was normal [Normal Outer Ear/Nose] : the ears and nose were normal in appearance [No Respiratory Distress] : no respiratory distress [Clear to Auscultation] : lungs were clear to auscultation bilaterally [No Accessory Muscle Use] : no accessory muscle use [Normal Rate] : heart rate was normal  [Regular Rhythm] : with a regular rhythm [Normal S1, S2] : normal S1 and S2 [No Murmurs] : no murmurs heard [No Edema] : there was no peripheral edema [Normal Bowel Sounds] : normal bowel sounds [Non Tender] : non-tender [Soft] : abdomen soft [Not Distended] : not distended [Normal Post Cervical Nodes] : no posterior cervical lymphadenopathy [Normal Anterior Cervical Nodes] : no anterior cervical lymphadenopathy [No CVA Tenderness] : no ~M costovertebral angle tenderness [No Spinal Tenderness] : no spinal tenderness [Normal Gait] : normal gait [No Rash] : no rash [Normal Strength/Tone] : muscle strength and tone were normal [Oriented x3] : oriented to person, place, and time [Normal Affect] : the affect was normal [de-identified] : impacted cerumen both ears

## 2022-11-14 NOTE — HEALTH RISK ASSESSMENT
[HRA Reviewed] : Health risk assessment reviewed [Independent] : feeding [Some assistance needed] : using telephone [Full assistance needed] : managing finances [Yes] : The patient does have visual impairment [TimeGetUpGo] : 0

## 2022-11-17 ENCOUNTER — NON-APPOINTMENT (OUTPATIENT)
Age: 87
End: 2022-11-17

## 2022-11-21 ENCOUNTER — APPOINTMENT (OUTPATIENT)
Dept: OTOLARYNGOLOGY | Facility: CLINIC | Age: 87
End: 2022-11-21

## 2022-11-21 PROCEDURE — 92593: CPT | Mod: NC

## 2022-12-01 ENCOUNTER — TRANSCRIPTION ENCOUNTER (OUTPATIENT)
Age: 87
End: 2022-12-01

## 2023-01-02 ENCOUNTER — NON-APPOINTMENT (OUTPATIENT)
Age: 88
End: 2023-01-02

## 2023-01-03 ENCOUNTER — NON-APPOINTMENT (OUTPATIENT)
Age: 88
End: 2023-01-03

## 2023-01-04 ENCOUNTER — TRANSCRIPTION ENCOUNTER (OUTPATIENT)
Age: 88
End: 2023-01-04

## 2023-01-04 ENCOUNTER — NON-APPOINTMENT (OUTPATIENT)
Age: 88
End: 2023-01-04

## 2023-01-16 PROBLEM — R07.81 RIB PAIN: Status: ACTIVE | Noted: 2022-10-06

## 2023-01-17 ENCOUNTER — APPOINTMENT (OUTPATIENT)
Dept: HOME HEALTH SERVICES | Facility: HOME HEALTH | Age: 88
End: 2023-01-17
Payer: MEDICARE

## 2023-01-17 VITALS
SYSTOLIC BLOOD PRESSURE: 120 MMHG | RESPIRATION RATE: 18 BRPM | DIASTOLIC BLOOD PRESSURE: 70 MMHG | OXYGEN SATURATION: 96 % | HEART RATE: 70 BPM | TEMPERATURE: 97 F

## 2023-01-17 DIAGNOSIS — R07.81 PLEURODYNIA: ICD-10-CM

## 2023-01-17 PROCEDURE — 99349 HOME/RES VST EST MOD MDM 40: CPT

## 2023-01-17 NOTE — PHYSICAL EXAM
[No Acute Distress] : no acute distress [Well Nourished] : well nourished [Well Developed] : well developed [Normal Sclera/Conjunctiva] : normal sclera/conjunctiva [EOMI] : extra ocular movement intact [Normal Outer Ear/Nose] : the ears and nose were normal in appearance [Normal Oropharynx] : the oropharynx was normal [No Respiratory Distress] : no respiratory distress [Clear to Auscultation] : lungs were clear to auscultation bilaterally [No Accessory Muscle Use] : no accessory muscle use [Normal Rate] : heart rate was normal  [Regular Rhythm] : with a regular rhythm [Normal S1, S2] : normal S1 and S2 [No Murmurs] : no murmurs heard [Normal Bowel Sounds] : normal bowel sounds [No Edema] : there was no peripheral edema [Non Tender] : non-tender [Soft] : abdomen soft [Not Distended] : not distended [Normal Post Cervical Nodes] : no posterior cervical lymphadenopathy [Normal Anterior Cervical Nodes] : no anterior cervical lymphadenopathy [No CVA Tenderness] : no ~M costovertebral angle tenderness [No Spinal Tenderness] : no spinal tenderness [Normal Gait] : normal gait [Normal Strength/Tone] : muscle strength and tone were normal [No Rash] : no rash [Oriented x3] : oriented to person, place, and time [Normal Affect] : the affect was normal [de-identified] : impacted cerumen both ears

## 2023-01-17 NOTE — REASON FOR VISIT
[Follow-Up] : a follow-up visit [Family Member] : family member [Formal Caregiver] : formal caregiver [Pre-Visit Preparation] : pre-visit preparation was done [FreeTextEntry1] : type II DM, hypothyroidism, HLD, and s/p Pfizer x2 who p/w ERICKSON, decreased PO intake, and hypoxia x3d. Found to be COVID+ with BL infiltrates on CXR. Admitted to [facility] 5/11/22. [FreeTextEntry2] : AUBREY

## 2023-01-17 NOTE — CHRONIC CARE ASSESSMENT
[PPS Score: ____] : Palliative Performance Scale (PPS) Score: [unfilled] [FAST Score: ____] : Functional Assessment Scale (FAST) Score: [unfilled] [Limited decision making ability] : limited decision making ability [de-identified] : as tolerated [de-identified] : diabetic diet

## 2023-01-17 NOTE — HISTORY OF PRESENT ILLNESS
[Family Member] : family member [Formal Caregiver] : formal caregiver [FreeTextEntry1] : type II DM, hypothyroidism, HLD, and s/p Pfizer x2 who p/w ERICKSON, decreased PO intake, and hypoxia x3d. Found to be COVID+ with BL infiltrates on CXR. Admitted to [facility] 5/11/22. [FreeTextEntry2] : 89 yo F with type II DM, hypothyroidism, hyperlipidemia, and s/p Pfizer x2 who p/w ERICKSON, decreased PO intake, and hypoxia x3d.  Follow up on chronic medical issues.  Reports left wrist and hand pain after pulling down a curtain 2-3 weeks ago.  History of carpal tunnel with surgery in the right wrist.  \par \par Patient denies fever, cough, trouble breathing, rash, vomiting and diarrhea. Patient has not been in close contact with someone covid positive.\par \par N95 mask, gloves, eye wear and gown used during visit: [Y]. Total face to face time with patient is 60 min.\par \par Patient/ patient's caregiver reports no weight loss >10lbs in the past 6 months. No changes in dentition or swallowing reported, No changes in hearing or vision reported. No changes in Cognition reported. Patient denies any symptoms of depression or anxiety. Patient is ADL and IADL dependent. No changes in gait or falls reported. \par Patient's home environment is safe.\par \par \par \par \par \par \par \par \par \par \par \par

## 2023-03-13 RX ORDER — NORMAL SALT TABLETS 1 G/G
1 TABLET ORAL
Qty: 90 | Refills: 3 | Status: DISCONTINUED | COMMUNITY
Start: 2023-03-13 | End: 2023-03-13

## 2023-03-16 ENCOUNTER — APPOINTMENT (OUTPATIENT)
Dept: HOME HEALTH SERVICES | Facility: HOME HEALTH | Age: 88
End: 2023-03-16
Payer: MEDICARE

## 2023-03-16 VITALS
SYSTOLIC BLOOD PRESSURE: 120 MMHG | OXYGEN SATURATION: 96 % | TEMPERATURE: 97 F | DIASTOLIC BLOOD PRESSURE: 70 MMHG | RESPIRATION RATE: 18 BRPM | HEART RATE: 72 BPM

## 2023-03-16 DIAGNOSIS — M25.532 PAIN IN LEFT WRIST: ICD-10-CM

## 2023-03-16 PROCEDURE — 99349 HOME/RES VST EST MOD MDM 40: CPT

## 2023-03-16 NOTE — CHRONIC CARE ASSESSMENT
[PPS Score: ____] : Palliative Performance Scale (PPS) Score: [unfilled] [FAST Score: ____] : Functional Assessment Scale (FAST) Score: [unfilled] [Limited decision making ability] : limited decision making ability [de-identified] : as tolerated [de-identified] : diabetic diet

## 2023-03-16 NOTE — REASON FOR VISIT
[Follow-Up] : a follow-up visit [Family Member] : family member [Formal Caregiver] : formal caregiver [Pre-Visit Preparation] : pre-visit preparation was done [FreeTextEntry1] : type II DM, hypothyroidism, HLD, and s/p Pfizer x2 who p/w ERICKSON, decreased PO intake, and hypoxia x3d. Found to be COVID+ with BL infiltrates on CXR. Admitted to [facility] 5/11/22. [FreeTextEntry2] : AUBREY

## 2023-03-16 NOTE — HISTORY OF PRESENT ILLNESS
[Family Member] : family member [Formal Caregiver] : formal caregiver [[facility]] : [unfilled] is a co-management patient with [facility] [FreeTextEntry1] : type II DM, hypothyroidism, HLD, and s/p Pfizer x2 who p/w ERICKSON, decreased PO intake, and hypoxia x3d. Found to be COVID+ with BL infiltrates on CXR. Admitted to [facility] 5/11/22. [FreeTextEntry2] : 89 yo F with type II DM, hypothyroidism, hyperlipidemia, and s/p Pfizer x2 who p/w ERICKSON, decreased PO intake, and hypoxia x3d.  Follow up on chronic medical issues.  History of carpal tunnel with surgery in the right wrist.  Left wrist pain worsening.  1/2023 x-ray mild arthritis.  Daughter asking for rehab for short term.  2-3 weeks with intense therapy.  \par \par Patient denies fever, cough, trouble breathing, rash, vomiting and diarrhea. Patient has not been in close contact with someone covid positive.\par \par N95 mask, gloves, eye wear and gown used during visit: [Y]. Total face to face time with patient is 60 min.\par \par Patient/ patient's caregiver reports no weight loss >10lbs in the past 6 months. No changes in dentition or swallowing reported, No changes in hearing or vision reported. No changes in Cognition reported. Patient denies any symptoms of depression or anxiety. Patient is ADL and IADL dependent. No changes in gait or falls reported. \par Patient's home environment is safe.\par \par \par \par \par \par \par \par \par \par \par \par

## 2023-03-16 NOTE — PHYSICAL EXAM
[No Acute Distress] : no acute distress [Well Nourished] : well nourished [Well Developed] : well developed [Normal Sclera/Conjunctiva] : normal sclera/conjunctiva [EOMI] : extra ocular movement intact [Normal Outer Ear/Nose] : the ears and nose were normal in appearance [Normal Oropharynx] : the oropharynx was normal [No Respiratory Distress] : no respiratory distress [Clear to Auscultation] : lungs were clear to auscultation bilaterally [No Accessory Muscle Use] : no accessory muscle use [Normal Rate] : heart rate was normal  [Regular Rhythm] : with a regular rhythm [Normal S1, S2] : normal S1 and S2 [No Murmurs] : no murmurs heard [No Edema] : there was no peripheral edema [Normal Bowel Sounds] : normal bowel sounds [Non Tender] : non-tender [Soft] : abdomen soft [Not Distended] : not distended [Normal Post Cervical Nodes] : no posterior cervical lymphadenopathy [Normal Anterior Cervical Nodes] : no anterior cervical lymphadenopathy [No CVA Tenderness] : no ~M costovertebral angle tenderness [No Spinal Tenderness] : no spinal tenderness [Normal Gait] : normal gait [Normal Strength/Tone] : muscle strength and tone were normal [No Rash] : no rash [Oriented x3] : oriented to person, place, and time [Normal Affect] : the affect was normal [de-identified] : impacted cerumen both ears

## 2023-03-17 ENCOUNTER — LABORATORY RESULT (OUTPATIENT)
Age: 88
End: 2023-03-17

## 2023-03-27 ENCOUNTER — FORM ENCOUNTER (OUTPATIENT)
Age: 88
End: 2023-03-27

## 2023-05-02 ENCOUNTER — NON-APPOINTMENT (OUTPATIENT)
Age: 88
End: 2023-05-02

## 2023-05-03 ENCOUNTER — NON-APPOINTMENT (OUTPATIENT)
Age: 88
End: 2023-05-03

## 2023-05-03 ENCOUNTER — TRANSCRIPTION ENCOUNTER (OUTPATIENT)
Age: 88
End: 2023-05-03

## 2023-05-08 ENCOUNTER — NON-APPOINTMENT (OUTPATIENT)
Age: 88
End: 2023-05-08

## 2023-05-08 RX ORDER — OXYCODONE AND ACETAMINOPHEN 5; 325 MG/1; MG/1
5-325 TABLET ORAL EVERY 4 HOURS
Qty: 180 | Refills: 0 | Status: DISCONTINUED | COMMUNITY
Start: 2022-10-06 | End: 2023-05-08

## 2023-05-09 ENCOUNTER — APPOINTMENT (OUTPATIENT)
Dept: HOME HEALTH SERVICES | Facility: HOME HEALTH | Age: 88
End: 2023-05-09
Payer: MEDICARE

## 2023-05-09 VITALS
DIASTOLIC BLOOD PRESSURE: 70 MMHG | HEART RATE: 70 BPM | TEMPERATURE: 97 F | OXYGEN SATURATION: 97 % | RESPIRATION RATE: 18 BRPM | SYSTOLIC BLOOD PRESSURE: 138 MMHG

## 2023-05-09 PROCEDURE — 99349 HOME/RES VST EST MOD MDM 40: CPT

## 2023-05-09 NOTE — CHRONIC CARE ASSESSMENT
[PPS Score: ____] : Palliative Performance Scale (PPS) Score: [unfilled] [FAST Score: ____] : Functional Assessment Scale (FAST) Score: [unfilled] [Limited decision making ability] : limited decision making ability [de-identified] : as tolerated [de-identified] : diabetic diet

## 2023-05-09 NOTE — REASON FOR VISIT
[Follow-Up] : a follow-up visit [Family Member] : family member [Formal Caregiver] : formal caregiver [Pre-Visit Preparation] : pre-visit preparation was done [FreeTextEntry1] : type II DM, hypothyroidism, HLD, and s/p Pfizer x2 who p/w ERICKSON, decreased PO intake, and hypoxia x3d. Found to be COVID+ with BL infiltrates on CXR. Admitted to [facility] 5/11/22. [FreeTextEntry2] : AUBREY

## 2023-05-09 NOTE — PHYSICAL EXAM
[Well Nourished] : well nourished [No Acute Distress] : no acute distress [Well Developed] : well developed [Normal Sclera/Conjunctiva] : normal sclera/conjunctiva [EOMI] : extra ocular movement intact [Normal Outer Ear/Nose] : the ears and nose were normal in appearance [Normal Oropharynx] : the oropharynx was normal [No Respiratory Distress] : no respiratory distress [Clear to Auscultation] : lungs were clear to auscultation bilaterally [No Accessory Muscle Use] : no accessory muscle use [Normal Rate] : heart rate was normal  [Regular Rhythm] : with a regular rhythm [Normal S1, S2] : normal S1 and S2 [No Murmurs] : no murmurs heard [No Edema] : there was no peripheral edema [Normal Bowel Sounds] : normal bowel sounds [Non Tender] : non-tender [Soft] : abdomen soft [Not Distended] : not distended [Normal Post Cervical Nodes] : no posterior cervical lymphadenopathy [Normal Anterior Cervical Nodes] : no anterior cervical lymphadenopathy [No CVA Tenderness] : no ~M costovertebral angle tenderness [No Spinal Tenderness] : no spinal tenderness [Normal Gait] : normal gait [Normal Strength/Tone] : muscle strength and tone were normal [No Rash] : no rash [Oriented x3] : oriented to person, place, and time [Normal Affect] : the affect was normal [de-identified] : impacted cerumen both ears

## 2023-05-09 NOTE — HISTORY OF PRESENT ILLNESS
[[facility] Co-Management Patient] : [unfilled] is a [facility] co-management patient [Family Member] : family member [Formal Caregiver] : formal caregiver [FreeTextEntry1] : type II DM, hypothyroidism, HLD, and s/p Pfizer x2 who p/w ERICKSON, decreased PO intake, and hypoxia x3d. Found to be COVID+ with BL infiltrates on CXR. Admitted to [facility] 5/11/22. [FreeTextEntry2] : 89 yo F with type II DM, hypothyroidism, hyperlipidemia, and s/p Pfizer x2 who p/w ERICKSON, decreased PO intake, and hypoxia x3d.  Follow up on chronic medical issues.  One time episode of hypotension.  BP stable at this time no other issues. \par \par Patient denies fever, cough, trouble breathing, rash, vomiting and diarrhea. Patient has not been in close contact with someone covid positive.\par \par N95 mask, gloves, eye wear and gown used during visit: [Y]. Total face to face time with patient is 60 min.\par \par Patient/ patient's caregiver reports no weight loss >10lbs in the past 6 months. No changes in dentition or swallowing reported, No changes in hearing or vision reported. No changes in Cognition reported. Patient denies any symptoms of depression or anxiety. Patient is ADL and IADL dependent. No changes in gait or falls reported. \par Patient's home environment is safe.\par \par \par \par \par \par \par \par \par \par \par \par

## 2023-06-02 DIAGNOSIS — R00.0 TACHYCARDIA, UNSPECIFIED: ICD-10-CM

## 2023-06-12 ENCOUNTER — APPOINTMENT (OUTPATIENT)
Dept: HOME HEALTH SERVICES | Facility: HOME HEALTH | Age: 88
End: 2023-06-12
Payer: MEDICARE

## 2023-06-12 VITALS
HEART RATE: 70 BPM | RESPIRATION RATE: 18 BRPM | OXYGEN SATURATION: 96 % | DIASTOLIC BLOOD PRESSURE: 70 MMHG | TEMPERATURE: 97 F | SYSTOLIC BLOOD PRESSURE: 140 MMHG

## 2023-06-12 PROCEDURE — 99349 HOME/RES VST EST MOD MDM 40: CPT

## 2023-06-12 NOTE — HISTORY OF PRESENT ILLNESS
[[facility] Co-Management Patient] : [unfilled] is a [facility] co-management patient [Family Member] : family member [Formal Caregiver] : formal caregiver [FreeTextEntry1] : type II DM, hypothyroidism, HLD, and s/p Pfizer x2 who p/w ERICKSON, decreased PO intake, and hypoxia x3d. Found to be COVID+ with BL infiltrates on CXR. Admitted to [facility] 5/11/22. [FreeTextEntry2] : 89 yo F with type II DM, hypothyroidism, hyperlipidemia, and s/p Pfizer x2 who p/w ERICKSON, decreased PO intake, and hypoxia x3d.  Follow up on chronic medical issues.  Continues with left knee receiving PT but does not want to take any pain medications.  Agreed to take Tylenol before PT.  No other issues\par \par Patient denies fever, cough, trouble breathing, rash, vomiting and diarrhea. Patient has not been in close contact with someone covid positive.\par \par N95 mask, gloves, eye wear and gown used during visit: [Y]. Total face to face time with patient is 60 min.\par \par Patient/ patient's caregiver reports no weight loss >10lbs in the past 6 months. No changes in dentition or swallowing reported, No changes in hearing or vision reported. No changes in Cognition reported. Patient denies any symptoms of depression or anxiety. Patient is ADL and IADL dependent. No changes in gait or falls reported. \par Patient's home environment is safe.\par \par \par \par \par \par \par \par \par \par \par \par

## 2023-06-12 NOTE — REASON FOR VISIT
[Follow-Up] : a follow-up visit [Family Member] : family member [Formal Caregiver] : formal caregiver [Pre-Visit Preparation] : pre-visit preparation was done [FreeTextEntry1] : type II DM, hypothyroidism, HLD, and s/p Pfizer x2 who p/w ERICKSON, decreased PO intake, and hypoxia x3d. Found to be COVID+ with BL infiltrates on CXR. Admitted to [facility] 5/11/22. [FreeTextEntry2] : AUBREY

## 2023-06-12 NOTE — PHYSICAL EXAM
[No Acute Distress] : no acute distress [Well Nourished] : well nourished [Well Developed] : well developed [Normal Sclera/Conjunctiva] : normal sclera/conjunctiva [EOMI] : extra ocular movement intact [Normal Outer Ear/Nose] : the ears and nose were normal in appearance [Normal Oropharynx] : the oropharynx was normal [No Respiratory Distress] : no respiratory distress [Clear to Auscultation] : lungs were clear to auscultation bilaterally [No Accessory Muscle Use] : no accessory muscle use [Normal Rate] : heart rate was normal  [Regular Rhythm] : with a regular rhythm [Normal S1, S2] : normal S1 and S2 [No Murmurs] : no murmurs heard [No Edema] : there was no peripheral edema [Normal Bowel Sounds] : normal bowel sounds [Non Tender] : non-tender [Soft] : abdomen soft [Normal Post Cervical Nodes] : no posterior cervical lymphadenopathy [Not Distended] : not distended [Normal Anterior Cervical Nodes] : no anterior cervical lymphadenopathy [No CVA Tenderness] : no ~M costovertebral angle tenderness [No Spinal Tenderness] : no spinal tenderness [Normal Gait] : normal gait [Normal Strength/Tone] : muscle strength and tone were normal [No Rash] : no rash [Normal Affect] : the affect was normal [Oriented x3] : oriented to person, place, and time [de-identified] : impacted cerumen both ears

## 2023-07-11 ENCOUNTER — NON-APPOINTMENT (OUTPATIENT)
Age: 88
End: 2023-07-11

## 2023-07-24 ENCOUNTER — APPOINTMENT (OUTPATIENT)
Dept: HOME HEALTH SERVICES | Facility: HOME HEALTH | Age: 88
End: 2023-07-24

## 2023-07-24 VITALS
SYSTOLIC BLOOD PRESSURE: 120 MMHG | HEART RATE: 93 BPM | DIASTOLIC BLOOD PRESSURE: 80 MMHG | RESPIRATION RATE: 17 BRPM | TEMPERATURE: 97.3 F | OXYGEN SATURATION: 98 %

## 2023-07-24 RX ORDER — NYSTATIN 100000 U/G
100000 OINTMENT TOPICAL 3 TIMES DAILY
Qty: 1 | Refills: 4 | Status: COMPLETED | COMMUNITY
Start: 2022-05-28 | End: 2023-07-24

## 2023-07-24 RX ORDER — NYSTATIN 100000 1/G
100000 POWDER TOPICAL 3 TIMES DAILY
Qty: 1 | Refills: 3 | Status: COMPLETED | COMMUNITY
Start: 2022-05-28 | End: 2023-07-24

## 2023-08-08 ENCOUNTER — NON-APPOINTMENT (OUTPATIENT)
Age: 88
End: 2023-08-08

## 2023-08-08 ENCOUNTER — TRANSCRIPTION ENCOUNTER (OUTPATIENT)
Age: 88
End: 2023-08-08

## 2023-08-14 DIAGNOSIS — N39.0 URINARY TRACT INFECTION, SITE NOT SPECIFIED: ICD-10-CM

## 2023-08-15 ENCOUNTER — NON-APPOINTMENT (OUTPATIENT)
Age: 88
End: 2023-08-15

## 2023-08-16 LAB
ALBUMIN SERPL ELPH-MCNC: 4.4 G/DL
ALP BLD-CCNC: 79 U/L
ALT SERPL-CCNC: 12 U/L
ANION GAP SERPL CALC-SCNC: 14 MMOL/L
APPEARANCE: CLEAR
AST SERPL-CCNC: 16 U/L
BILIRUB SERPL-MCNC: 0.8 MG/DL
BILIRUBIN URINE: NEGATIVE
BLOOD URINE: NEGATIVE
BUN SERPL-MCNC: 20 MG/DL
CALCIUM SERPL-MCNC: 9.5 MG/DL
CHLORIDE SERPL-SCNC: 104 MMOL/L
CO2 SERPL-SCNC: 22 MMOL/L
COLOR: YELLOW
CREAT SERPL-MCNC: 0.84 MG/DL
DEPRECATED D DIMER PPP IA-ACNC: 326 NG/ML DDU
EGFR: 64 ML/MIN/1.73M2
GLUCOSE QUALITATIVE U: NEGATIVE MG/DL
GLUCOSE SERPL-MCNC: 159 MG/DL
KETONES URINE: NEGATIVE MG/DL
LEUKOCYTE ESTERASE URINE: NEGATIVE
NITRITE URINE: NEGATIVE
PH URINE: 6
POTASSIUM SERPL-SCNC: 4.7 MMOL/L
PROT SERPL-MCNC: 6.7 G/DL
PROTEIN URINE: NEGATIVE MG/DL
SODIUM SERPL-SCNC: 140 MMOL/L
SPECIFIC GRAVITY URINE: 1.01
TSH SERPL-ACNC: 2.65 UIU/ML
UROBILINOGEN URINE: 0.2 MG/DL

## 2023-08-18 ENCOUNTER — APPOINTMENT (OUTPATIENT)
Dept: ORTHOPEDIC SURGERY | Facility: CLINIC | Age: 88
End: 2023-08-18
Payer: MEDICARE

## 2023-08-18 DIAGNOSIS — M71.22 SYNOVIAL CYST OF POPLITEAL SPACE [BAKER], LEFT KNEE: ICD-10-CM

## 2023-08-18 PROCEDURE — 99203 OFFICE O/P NEW LOW 30 MIN: CPT | Mod: 25

## 2023-08-18 PROCEDURE — 20611 DRAIN/INJ JOINT/BURSA W/US: CPT | Mod: LT

## 2023-08-18 PROCEDURE — 73562 X-RAY EXAM OF KNEE 3: CPT | Mod: LT

## 2023-08-18 RX ORDER — HYALURONATE SODIUM, STABILIZED 88 MG/4 ML
88 SYRINGE (ML) INTRAARTICULAR
Qty: 1 | Refills: 0 | Status: ACTIVE | OUTPATIENT
Start: 2023-08-18

## 2023-08-18 NOTE — PROCEDURE
[Aspiration] : Aspiration [Injection] : Injection [Left] : on the left.   [Effusion] : Effusion [Joint Pain] : Joint pain [Patient] : patient [Alcohol] : Alcohol [Ethyl Chloride Spray] : ethyl chloride spray was used as a topical anesthetic [Ultrasound Guided] : The procedure was ultrasound guided.   [Posterior] : posterior [Direct] : direct [18] : an 18-gauge [___ mL Fluid] : [unfilled] mL of [Same Needle/New Syringe] : the syringe was changed and the same needle was left in place and [Triamcinolone 10mg/mL___(mL)] : [unfilled] ~UmL of 10mg/mL triamcinolone [Bandage Applied] : a bandage [Tolerated Well] : The patient tolerated the procedure well [None] : None [No Strenuous Activity___day(s)] : avoid strenuous activity for [unfilled] day(s) [PRN] : as needed [de-identified] : POPLITEAL CYST

## 2023-08-18 NOTE — HISTORY OF PRESENT ILLNESS
[de-identified] : LOCATION:  LEFT LEG  POPLITEAL CYST  DURATION: 10 DAYS  QUALITY: SHARP  CONSTANT  PAIN LEVEL: 8/10  BETTER WITH REST, PAIN KILLER  WORSE WITH BENDING, WALKING PRIOR STUDIES: ULTRASOUND AUGT 11, 2023

## 2023-08-18 NOTE — PHYSICAL EXAM
[de-identified] : PHYSICAL EXAM LEFT KNEE  AROM EXTENSION = 0 FLEXION = 110  SPECIAL TESTS  PATELLAR GRIND = NEG DRAWER  = NEG LACHMAN = NEG MACMURRAY = NEG   MOTOR = GROSSLY INTACT SENSORY = GROSSLY INTACT    [de-identified] : XRAY LEFT KNEE: 4 views of the knee No obvious fracture or dislocation.  Alignment within normal limits

## 2023-09-01 ENCOUNTER — NON-APPOINTMENT (OUTPATIENT)
Age: 88
End: 2023-09-01

## 2023-09-13 ENCOUNTER — APPOINTMENT (OUTPATIENT)
Dept: HOME HEALTH SERVICES | Facility: HOME HEALTH | Age: 88
End: 2023-09-13
Payer: MEDICARE

## 2023-09-13 VITALS
SYSTOLIC BLOOD PRESSURE: 124 MMHG | DIASTOLIC BLOOD PRESSURE: 70 MMHG | RESPIRATION RATE: 18 BRPM | OXYGEN SATURATION: 97 % | HEART RATE: 88 BPM | TEMPERATURE: 97 F

## 2023-09-13 DIAGNOSIS — R94.31 ABNORMAL ELECTROCARDIOGRAM [ECG] [EKG]: ICD-10-CM

## 2023-09-13 PROCEDURE — 99349 HOME/RES VST EST MOD MDM 40: CPT

## 2023-09-15 ENCOUNTER — LABORATORY RESULT (OUTPATIENT)
Age: 88
End: 2023-09-15

## 2023-09-15 LAB
A1CG - A1C WITH ESTIMATED AVERAGE GLUCOSE: NORMAL
TSH SERPL-ACNC: NORMAL

## 2023-09-18 ENCOUNTER — LABORATORY RESULT (OUTPATIENT)
Age: 88
End: 2023-09-18

## 2023-09-18 ENCOUNTER — NON-APPOINTMENT (OUTPATIENT)
Age: 88
End: 2023-09-18

## 2023-09-18 PROBLEM — R94.31 PROLONGED QT INTERVAL: Status: ACTIVE | Noted: 2022-05-17

## 2023-09-19 ENCOUNTER — LABORATORY RESULT (OUTPATIENT)
Age: 88
End: 2023-09-19

## 2023-09-19 ENCOUNTER — NON-APPOINTMENT (OUTPATIENT)
Age: 88
End: 2023-09-19

## 2023-10-02 ENCOUNTER — APPOINTMENT (OUTPATIENT)
Dept: OPHTHALMOLOGY | Facility: CLINIC | Age: 88
End: 2023-10-02
Payer: MEDICARE

## 2023-10-02 ENCOUNTER — NON-APPOINTMENT (OUTPATIENT)
Age: 88
End: 2023-10-02

## 2023-10-02 PROCEDURE — 92201 OPSCPY EXTND RTA DRAW UNI/BI: CPT

## 2023-10-02 PROCEDURE — 92015 DETERMINE REFRACTIVE STATE: CPT

## 2023-10-02 PROCEDURE — 92014 COMPRE OPH EXAM EST PT 1/>: CPT

## 2023-10-02 PROCEDURE — 92134 CPTRZ OPH DX IMG PST SGM RTA: CPT

## 2023-10-03 ENCOUNTER — NON-APPOINTMENT (OUTPATIENT)
Age: 88
End: 2023-10-03

## 2023-10-04 ENCOUNTER — INPATIENT (INPATIENT)
Facility: HOSPITAL | Age: 88
LOS: 4 days | Discharge: NURSING, REHAB OR LONG-TERM CARE | DRG: 871 | End: 2023-10-09
Attending: STUDENT IN AN ORGANIZED HEALTH CARE EDUCATION/TRAINING PROGRAM | Admitting: INTERNAL MEDICINE
Payer: MEDICARE

## 2023-10-04 VITALS
SYSTOLIC BLOOD PRESSURE: 138 MMHG | HEART RATE: 93 BPM | TEMPERATURE: 101 F | DIASTOLIC BLOOD PRESSURE: 84 MMHG | OXYGEN SATURATION: 95 % | RESPIRATION RATE: 18 BRPM

## 2023-10-04 DIAGNOSIS — R65.20 SEVERE SEPSIS WITHOUT SEPTIC SHOCK: ICD-10-CM

## 2023-10-04 DIAGNOSIS — E03.9 HYPOTHYROIDISM, UNSPECIFIED: ICD-10-CM

## 2023-10-04 DIAGNOSIS — E22.2 SYNDROME OF INAPPROPRIATE SECRETION OF ANTIDIURETIC HORMONE: ICD-10-CM

## 2023-10-04 DIAGNOSIS — Z79.890 HORMONE REPLACEMENT THERAPY: ICD-10-CM

## 2023-10-04 DIAGNOSIS — J15.69 PNEUMONIA DUE TO OTHER GRAM-NEGATIVE BACTERIA: ICD-10-CM

## 2023-10-04 DIAGNOSIS — A41.9 SEPSIS, UNSPECIFIED ORGANISM: ICD-10-CM

## 2023-10-04 DIAGNOSIS — Z79.84 LONG TERM (CURRENT) USE OF ORAL HYPOGLYCEMIC DRUGS: ICD-10-CM

## 2023-10-04 DIAGNOSIS — E80.7 DISORDER OF BILIRUBIN METABOLISM, UNSPECIFIED: ICD-10-CM

## 2023-10-04 DIAGNOSIS — E11.9 TYPE 2 DIABETES MELLITUS WITHOUT COMPLICATIONS: ICD-10-CM

## 2023-10-04 DIAGNOSIS — E78.5 HYPERLIPIDEMIA, UNSPECIFIED: ICD-10-CM

## 2023-10-04 DIAGNOSIS — D68.51 ACTIVATED PROTEIN C RESISTANCE: ICD-10-CM

## 2023-10-04 DIAGNOSIS — Z11.52 ENCOUNTER FOR SCREENING FOR COVID-19: ICD-10-CM

## 2023-10-04 LAB
ALBUMIN SERPL ELPH-MCNC: 3.6 G/DL — SIGNIFICANT CHANGE UP (ref 3.4–5)
ALP SERPL-CCNC: 71 U/L — SIGNIFICANT CHANGE UP (ref 40–120)
ALT FLD-CCNC: 18 U/L — SIGNIFICANT CHANGE UP (ref 12–42)
ANION GAP SERPL CALC-SCNC: 9 MMOL/L — SIGNIFICANT CHANGE UP (ref 9–16)
APPEARANCE UR: CLEAR — SIGNIFICANT CHANGE UP
APTT BLD: 39.4 SEC — HIGH (ref 24.5–35.6)
AST SERPL-CCNC: 15 U/L — SIGNIFICANT CHANGE UP (ref 15–37)
BACTERIA # UR AUTO: PRESENT /HPF — SIGNIFICANT CHANGE UP
BASOPHILS # BLD AUTO: 0.02 K/UL — SIGNIFICANT CHANGE UP (ref 0–0.2)
BASOPHILS NFR BLD AUTO: 0.2 % — SIGNIFICANT CHANGE UP (ref 0–2)
BILIRUB SERPL-MCNC: 2.2 MG/DL — HIGH (ref 0.2–1.2)
BILIRUB UR-MCNC: NEGATIVE — SIGNIFICANT CHANGE UP
BUN SERPL-MCNC: 12 MG/DL — SIGNIFICANT CHANGE UP (ref 7–23)
CALCIUM SERPL-MCNC: 9.2 MG/DL — SIGNIFICANT CHANGE UP (ref 8.5–10.5)
CHLORIDE SERPL-SCNC: 99 MMOL/L — SIGNIFICANT CHANGE UP (ref 96–108)
CO2 SERPL-SCNC: 28 MMOL/L — SIGNIFICANT CHANGE UP (ref 22–31)
COLOR SPEC: YELLOW — SIGNIFICANT CHANGE UP
CREAT SERPL-MCNC: 0.82 MG/DL — SIGNIFICANT CHANGE UP (ref 0.5–1.3)
DIFF PNL FLD: NEGATIVE — SIGNIFICANT CHANGE UP
EGFR: 66 ML/MIN/1.73M2 — SIGNIFICANT CHANGE UP
EOSINOPHIL # BLD AUTO: 0 K/UL — SIGNIFICANT CHANGE UP (ref 0–0.5)
EOSINOPHIL NFR BLD AUTO: 0 % — SIGNIFICANT CHANGE UP (ref 0–6)
EPI CELLS # UR: PRESENT
FLUAV AG NPH QL: SIGNIFICANT CHANGE UP
FLUBV AG NPH QL: SIGNIFICANT CHANGE UP
GLUCOSE SERPL-MCNC: 149 MG/DL — HIGH (ref 70–99)
GLUCOSE UR QL: NEGATIVE MG/DL — SIGNIFICANT CHANGE UP
HCT VFR BLD CALC: 38.2 % — SIGNIFICANT CHANGE UP (ref 34.5–45)
HGB BLD-MCNC: 12.2 G/DL — SIGNIFICANT CHANGE UP (ref 11.5–15.5)
IMM GRANULOCYTES NFR BLD AUTO: 0.5 % — SIGNIFICANT CHANGE UP (ref 0–0.9)
INR BLD: 1.17 — SIGNIFICANT CHANGE UP (ref 0.85–1.18)
KETONES UR-MCNC: 80 MG/DL
LACTATE BLDV-MCNC: 1.2 MMOL/L — SIGNIFICANT CHANGE UP (ref 0.5–2)
LEUKOCYTE ESTERASE UR-ACNC: ABNORMAL
LYMPHOCYTES # BLD AUTO: 1.24 K/UL — SIGNIFICANT CHANGE UP (ref 1–3.3)
LYMPHOCYTES # BLD AUTO: 11.2 % — LOW (ref 13–44)
MCHC RBC-ENTMCNC: 29.4 PG — SIGNIFICANT CHANGE UP (ref 27–34)
MCHC RBC-ENTMCNC: 31.9 GM/DL — LOW (ref 32–36)
MCV RBC AUTO: 92 FL — SIGNIFICANT CHANGE UP (ref 80–100)
MONOCYTES # BLD AUTO: 0.96 K/UL — HIGH (ref 0–0.9)
MONOCYTES NFR BLD AUTO: 8.7 % — SIGNIFICANT CHANGE UP (ref 2–14)
NEUTROPHILS # BLD AUTO: 8.79 K/UL — HIGH (ref 1.8–7.4)
NEUTROPHILS NFR BLD AUTO: 79.4 % — HIGH (ref 43–77)
NITRITE UR-MCNC: NEGATIVE — SIGNIFICANT CHANGE UP
NRBC # BLD: 0 /100 WBCS — SIGNIFICANT CHANGE UP (ref 0–0)
PH UR: 6 — SIGNIFICANT CHANGE UP (ref 5–8)
PLATELET # BLD AUTO: 197 K/UL — SIGNIFICANT CHANGE UP (ref 150–400)
POTASSIUM SERPL-MCNC: 3.8 MMOL/L — SIGNIFICANT CHANGE UP (ref 3.5–5.3)
POTASSIUM SERPL-SCNC: 3.8 MMOL/L — SIGNIFICANT CHANGE UP (ref 3.5–5.3)
PROT SERPL-MCNC: 7.5 G/DL — SIGNIFICANT CHANGE UP (ref 6.4–8.2)
PROT UR-MCNC: 30 MG/DL
PROTHROM AB SERPL-ACNC: 12.8 SEC — SIGNIFICANT CHANGE UP (ref 9.5–13)
RBC # BLD: 4.15 M/UL — SIGNIFICANT CHANGE UP (ref 3.8–5.2)
RBC # FLD: 13.4 % — SIGNIFICANT CHANGE UP (ref 10.3–14.5)
RBC CASTS # UR COMP ASSIST: 0 /HPF — SIGNIFICANT CHANGE UP (ref 0–4)
RSV RNA NPH QL NAA+NON-PROBE: SIGNIFICANT CHANGE UP
SARS-COV-2 RNA SPEC QL NAA+PROBE: SIGNIFICANT CHANGE UP
SODIUM SERPL-SCNC: 136 MMOL/L — SIGNIFICANT CHANGE UP (ref 132–145)
SP GR SPEC: 1.02 — SIGNIFICANT CHANGE UP (ref 1–1.03)
UROBILINOGEN FLD QL: 1 MG/DL — SIGNIFICANT CHANGE UP (ref 0.2–1)
WBC # BLD: 11.06 K/UL — HIGH (ref 3.8–10.5)
WBC # FLD AUTO: 11.06 K/UL — HIGH (ref 3.8–10.5)
WBC UR QL: 4 /HPF — SIGNIFICANT CHANGE UP (ref 0–5)

## 2023-10-04 PROCEDURE — 99285 EMERGENCY DEPT VISIT HI MDM: CPT

## 2023-10-04 PROCEDURE — 71045 X-RAY EXAM CHEST 1 VIEW: CPT | Mod: 26

## 2023-10-04 RX ORDER — CEFEPIME 1 G/1
1000 INJECTION, POWDER, FOR SOLUTION INTRAMUSCULAR; INTRAVENOUS ONCE
Refills: 0 | Status: COMPLETED | OUTPATIENT
Start: 2023-10-04 | End: 2023-10-04

## 2023-10-04 RX ORDER — SODIUM CHLORIDE 9 MG/ML
1000 INJECTION INTRAMUSCULAR; INTRAVENOUS; SUBCUTANEOUS ONCE
Refills: 0 | Status: COMPLETED | OUTPATIENT
Start: 2023-10-04 | End: 2023-10-04

## 2023-10-04 RX ORDER — ACETAMINOPHEN 500 MG
975 TABLET ORAL ONCE
Refills: 0 | Status: COMPLETED | OUTPATIENT
Start: 2023-10-04 | End: 2023-10-04

## 2023-10-04 RX ADMIN — Medication 975 MILLIGRAM(S): at 18:31

## 2023-10-04 RX ADMIN — Medication 975 MILLIGRAM(S): at 17:17

## 2023-10-04 RX ADMIN — CEFEPIME 1000 MILLIGRAM(S): 1 INJECTION, POWDER, FOR SOLUTION INTRAMUSCULAR; INTRAVENOUS at 18:00

## 2023-10-04 RX ADMIN — SODIUM CHLORIDE 1000 MILLILITER(S): 9 INJECTION INTRAMUSCULAR; INTRAVENOUS; SUBCUTANEOUS at 18:30

## 2023-10-04 RX ADMIN — CEFEPIME 100 MILLIGRAM(S): 1 INJECTION, POWDER, FOR SOLUTION INTRAMUSCULAR; INTRAVENOUS at 17:16

## 2023-10-04 RX ADMIN — SODIUM CHLORIDE 1000 MILLILITER(S): 9 INJECTION INTRAMUSCULAR; INTRAVENOUS; SUBCUTANEOUS at 17:16

## 2023-10-04 NOTE — ED ADULT NURSE NOTE - NSFALLHARMRISKINTERV_ED_ALL_ED
Assistance OOB with selected safe patient handling equipment if applicable/Assistance with ambulation/Communicate risk of Fall with Harm to all staff, patient, and family/Monitor gait and stability/Provide visual cue: red socks, yellow wristband, yellow gown, etc/Reinforce activity limits and safety measures with patient and family/Bed in lowest position, wheels locked, appropriate side rails in place/Call bell, personal items and telephone in reach/Instruct patient to call for assistance before getting out of bed/chair/stretcher/Non-slip footwear applied when patient is off stretcher/[place] to call system/Physically safe environment - no spills, clutter or unnecessary equipment/Purposeful Proactive Rounding/Room/bathroom lighting operational, light cord in reach

## 2023-10-04 NOTE — ED PROVIDER NOTE - CLINICAL SUMMARY MEDICAL DECISION MAKING FREE TEXT BOX
Elderly female with fever x24 hours    Vital signs remarkable for fever and mild tachycardia, but normotensive, not hypoxic  Very well-appearing on exam    6:12 PM:  Labs remarkable for leukocytosis to 11, flu COVID-negative, UA negative  Chest x-ray with right perihilar opacity concerning for pneumonia  [facility] called and endorsed to hospitalist Dr. Ortiz for admission  Patient consented for admission

## 2023-10-04 NOTE — ED PROVIDER NOTE - OBJECTIVE STATEMENT
89-year-old female past medical history of thyroid disease and diabetes,  AAOx4 at baseline, lives at home and uses cane/walker and assistance from home health aide  Presents with fever x24 hours associated with urinary frequency and mild headache.  Also some generalized weakness earlier and felt that she could not walk  Denies other acute complaints  Ghanaian translation via daughter at bedside

## 2023-10-04 NOTE — ED PROVIDER NOTE - PHYSICAL EXAMINATION
GENERAL: well appearing, no acute distress   HEAD: atraumatic   EYES: EOMI   ENT: moist oral mucosa   CARDIAC: tachy 105 bpm, Extremities warm and well-perfused  RESPIRATORY: no increased work of breathing, lungs clear  ABDO: soft NTND  MUSCULOSKELETAL: no deformity   NEUROLOGICAL: alert, spontaneous movement of extremities   SKIN: no visible rash  PSYCHIATRIC: cooperative - - -

## 2023-10-05 ENCOUNTER — NON-APPOINTMENT (OUTPATIENT)
Age: 88
End: 2023-10-05

## 2023-10-05 DIAGNOSIS — A41.9 SEPSIS, UNSPECIFIED ORGANISM: ICD-10-CM

## 2023-10-05 DIAGNOSIS — E11.9 TYPE 2 DIABETES MELLITUS WITHOUT COMPLICATIONS: ICD-10-CM

## 2023-10-05 DIAGNOSIS — R65.10 SYSTEMIC INFLAMMATORY RESPONSE SYNDROME (SIRS) OF NON-INFECTIOUS ORIGIN WITHOUT ACUTE ORGAN DYSFUNCTION: ICD-10-CM

## 2023-10-05 DIAGNOSIS — Z29.9 ENCOUNTER FOR PROPHYLACTIC MEASURES, UNSPECIFIED: ICD-10-CM

## 2023-10-05 DIAGNOSIS — J15.69 PNEUMONIA DUE TO OTHER GRAM-NEGATIVE BACTERIA: ICD-10-CM

## 2023-10-05 DIAGNOSIS — E78.5 HYPERLIPIDEMIA, UNSPECIFIED: ICD-10-CM

## 2023-10-05 DIAGNOSIS — E03.9 HYPOTHYROIDISM, UNSPECIFIED: ICD-10-CM

## 2023-10-05 DIAGNOSIS — E87.1 HYPO-OSMOLALITY AND HYPONATREMIA: ICD-10-CM

## 2023-10-05 LAB
ALBUMIN SERPL ELPH-MCNC: 3.2 G/DL — LOW (ref 3.3–5)
ALBUMIN SERPL ELPH-MCNC: 3.3 G/DL — SIGNIFICANT CHANGE UP (ref 3.3–5)
ALP SERPL-CCNC: 71 U/L — SIGNIFICANT CHANGE UP (ref 40–120)
ALP SERPL-CCNC: 75 U/L — SIGNIFICANT CHANGE UP (ref 40–120)
ALT FLD-CCNC: 8 U/L — LOW (ref 10–45)
ALT FLD-CCNC: 8 U/L — LOW (ref 10–45)
ANION GAP SERPL CALC-SCNC: 11 MMOL/L — SIGNIFICANT CHANGE UP (ref 5–17)
ANION GAP SERPL CALC-SCNC: 16 MMOL/L — SIGNIFICANT CHANGE UP (ref 5–17)
AST SERPL-CCNC: 13 U/L — SIGNIFICANT CHANGE UP (ref 10–40)
AST SERPL-CCNC: 15 U/L — SIGNIFICANT CHANGE UP (ref 10–40)
BASOPHILS # BLD AUTO: 0.02 K/UL — SIGNIFICANT CHANGE UP (ref 0–0.2)
BASOPHILS NFR BLD AUTO: 0.2 % — SIGNIFICANT CHANGE UP (ref 0–2)
BILIRUB SERPL-MCNC: 1 MG/DL — SIGNIFICANT CHANGE UP (ref 0.2–1.2)
BILIRUB SERPL-MCNC: 1.3 MG/DL — HIGH (ref 0.2–1.2)
BUN SERPL-MCNC: 11 MG/DL — SIGNIFICANT CHANGE UP (ref 7–23)
BUN SERPL-MCNC: 14 MG/DL — SIGNIFICANT CHANGE UP (ref 7–23)
CALCIUM SERPL-MCNC: 8.6 MG/DL — SIGNIFICANT CHANGE UP (ref 8.4–10.5)
CALCIUM SERPL-MCNC: 9.1 MG/DL — SIGNIFICANT CHANGE UP (ref 8.4–10.5)
CHLORIDE SERPL-SCNC: 97 MMOL/L — SIGNIFICANT CHANGE UP (ref 96–108)
CHLORIDE SERPL-SCNC: 98 MMOL/L — SIGNIFICANT CHANGE UP (ref 96–108)
CO2 SERPL-SCNC: 19 MMOL/L — LOW (ref 22–31)
CO2 SERPL-SCNC: 23 MMOL/L — SIGNIFICANT CHANGE UP (ref 22–31)
CREAT SERPL-MCNC: 0.69 MG/DL — SIGNIFICANT CHANGE UP (ref 0.5–1.3)
CREAT SERPL-MCNC: 0.76 MG/DL — SIGNIFICANT CHANGE UP (ref 0.5–1.3)
EGFR: 72 ML/MIN/1.73M2 — SIGNIFICANT CHANGE UP
EGFR: 80 ML/MIN/1.73M2 — SIGNIFICANT CHANGE UP
EOSINOPHIL # BLD AUTO: 0 K/UL — SIGNIFICANT CHANGE UP (ref 0–0.5)
EOSINOPHIL NFR BLD AUTO: 0 % — SIGNIFICANT CHANGE UP (ref 0–6)
GLUCOSE BLDC GLUCOMTR-MCNC: 132 MG/DL — HIGH (ref 70–99)
GLUCOSE BLDC GLUCOMTR-MCNC: 150 MG/DL — HIGH (ref 70–99)
GLUCOSE BLDC GLUCOMTR-MCNC: 179 MG/DL — HIGH (ref 70–99)
GLUCOSE BLDC GLUCOMTR-MCNC: 181 MG/DL — HIGH (ref 70–99)
GLUCOSE SERPL-MCNC: 158 MG/DL — HIGH (ref 70–99)
GLUCOSE SERPL-MCNC: 158 MG/DL — HIGH (ref 70–99)
HCT VFR BLD CALC: 33.3 % — LOW (ref 34.5–45)
HCT VFR BLD CALC: 37.5 % — SIGNIFICANT CHANGE UP (ref 34.5–45)
HGB BLD-MCNC: 11.1 G/DL — LOW (ref 11.5–15.5)
HGB BLD-MCNC: 11.8 G/DL — SIGNIFICANT CHANGE UP (ref 11.5–15.5)
IMM GRANULOCYTES NFR BLD AUTO: 0.6 % — SIGNIFICANT CHANGE UP (ref 0–0.9)
LYMPHOCYTES # BLD AUTO: 1.01 K/UL — SIGNIFICANT CHANGE UP (ref 1–3.3)
LYMPHOCYTES # BLD AUTO: 9.3 % — LOW (ref 13–44)
MAGNESIUM SERPL-MCNC: 1.8 MG/DL — SIGNIFICANT CHANGE UP (ref 1.6–2.6)
MCHC RBC-ENTMCNC: 29.4 PG — SIGNIFICANT CHANGE UP (ref 27–34)
MCHC RBC-ENTMCNC: 29.7 PG — SIGNIFICANT CHANGE UP (ref 27–34)
MCHC RBC-ENTMCNC: 31.5 GM/DL — LOW (ref 32–36)
MCHC RBC-ENTMCNC: 33.3 GM/DL — SIGNIFICANT CHANGE UP (ref 32–36)
MCV RBC AUTO: 89 FL — SIGNIFICANT CHANGE UP (ref 80–100)
MCV RBC AUTO: 93.3 FL — SIGNIFICANT CHANGE UP (ref 80–100)
MONOCYTES # BLD AUTO: 1.07 K/UL — HIGH (ref 0–0.9)
MONOCYTES NFR BLD AUTO: 9.8 % — SIGNIFICANT CHANGE UP (ref 2–14)
NEUTROPHILS # BLD AUTO: 8.72 K/UL — HIGH (ref 1.8–7.4)
NEUTROPHILS NFR BLD AUTO: 80.1 % — HIGH (ref 43–77)
NRBC # BLD: 0 /100 WBCS — SIGNIFICANT CHANGE UP (ref 0–0)
NRBC # BLD: 0 /100 WBCS — SIGNIFICANT CHANGE UP (ref 0–0)
PHOSPHATE SERPL-MCNC: 3 MG/DL — SIGNIFICANT CHANGE UP (ref 2.5–4.5)
PLATELET # BLD AUTO: 152 K/UL — SIGNIFICANT CHANGE UP (ref 150–400)
PLATELET # BLD AUTO: 176 K/UL — SIGNIFICANT CHANGE UP (ref 150–400)
POTASSIUM SERPL-MCNC: 3.6 MMOL/L — SIGNIFICANT CHANGE UP (ref 3.5–5.3)
POTASSIUM SERPL-MCNC: 3.9 MMOL/L — SIGNIFICANT CHANGE UP (ref 3.5–5.3)
POTASSIUM SERPL-SCNC: 3.6 MMOL/L — SIGNIFICANT CHANGE UP (ref 3.5–5.3)
POTASSIUM SERPL-SCNC: 3.9 MMOL/L — SIGNIFICANT CHANGE UP (ref 3.5–5.3)
PROCALCITONIN SERPL-MCNC: 0.08 NG/ML — SIGNIFICANT CHANGE UP (ref 0.02–0.1)
PROCALCITONIN SERPL-MCNC: 0.09 NG/ML — SIGNIFICANT CHANGE UP (ref 0.02–0.1)
PROT SERPL-MCNC: 6.5 G/DL — SIGNIFICANT CHANGE UP (ref 6–8.3)
PROT SERPL-MCNC: 6.8 G/DL — SIGNIFICANT CHANGE UP (ref 6–8.3)
RAPID RVP RESULT: SIGNIFICANT CHANGE UP
RBC # BLD: 3.74 M/UL — LOW (ref 3.8–5.2)
RBC # BLD: 4.02 M/UL — SIGNIFICANT CHANGE UP (ref 3.8–5.2)
RBC # FLD: 13.2 % — SIGNIFICANT CHANGE UP (ref 10.3–14.5)
RBC # FLD: 13.2 % — SIGNIFICANT CHANGE UP (ref 10.3–14.5)
SARS-COV-2 RNA SPEC QL NAA+PROBE: SIGNIFICANT CHANGE UP
SODIUM SERPL-SCNC: 132 MMOL/L — LOW (ref 135–145)
SODIUM SERPL-SCNC: 132 MMOL/L — LOW (ref 135–145)
WBC # BLD: 10.89 K/UL — HIGH (ref 3.8–10.5)
WBC # BLD: 11.21 K/UL — HIGH (ref 3.8–10.5)
WBC # FLD AUTO: 10.89 K/UL — HIGH (ref 3.8–10.5)
WBC # FLD AUTO: 11.21 K/UL — HIGH (ref 3.8–10.5)

## 2023-10-05 PROCEDURE — 71045 X-RAY EXAM CHEST 1 VIEW: CPT | Mod: 26

## 2023-10-05 PROCEDURE — 99223 1ST HOSP IP/OBS HIGH 75: CPT | Mod: GC

## 2023-10-05 RX ORDER — ACETAMINOPHEN 500 MG
650 TABLET ORAL EVERY 6 HOURS
Refills: 0 | Status: DISCONTINUED | OUTPATIENT
Start: 2023-10-05 | End: 2023-10-09

## 2023-10-05 RX ORDER — ACETAMINOPHEN 500 MG
650 TABLET ORAL EVERY 6 HOURS
Refills: 0 | Status: DISCONTINUED | OUTPATIENT
Start: 2023-10-05 | End: 2023-10-05

## 2023-10-05 RX ORDER — INSULIN LISPRO 100/ML
VIAL (ML) SUBCUTANEOUS
Refills: 0 | Status: DISCONTINUED | OUTPATIENT
Start: 2023-10-05 | End: 2023-10-09

## 2023-10-05 RX ORDER — PIPERACILLIN AND TAZOBACTAM 4; .5 G/20ML; G/20ML
4.5 INJECTION, POWDER, LYOPHILIZED, FOR SOLUTION INTRAVENOUS ONCE
Refills: 0 | Status: COMPLETED | OUTPATIENT
Start: 2023-10-05 | End: 2023-10-05

## 2023-10-05 RX ORDER — SODIUM CHLORIDE 9 MG/ML
1000 INJECTION, SOLUTION INTRAVENOUS
Refills: 0 | Status: DISCONTINUED | OUTPATIENT
Start: 2023-10-05 | End: 2023-10-09

## 2023-10-05 RX ORDER — CEFTRIAXONE 500 MG/1
1000 INJECTION, POWDER, FOR SOLUTION INTRAMUSCULAR; INTRAVENOUS EVERY 24 HOURS
Refills: 0 | Status: DISCONTINUED | OUTPATIENT
Start: 2023-10-05 | End: 2023-10-05

## 2023-10-05 RX ORDER — GLUCAGON INJECTION, SOLUTION 0.5 MG/.1ML
1 INJECTION, SOLUTION SUBCUTANEOUS ONCE
Refills: 0 | Status: DISCONTINUED | OUTPATIENT
Start: 2023-10-05 | End: 2023-10-09

## 2023-10-05 RX ORDER — ENOXAPARIN SODIUM 100 MG/ML
40 INJECTION SUBCUTANEOUS EVERY 24 HOURS
Refills: 0 | Status: DISCONTINUED | OUTPATIENT
Start: 2023-10-05 | End: 2023-10-09

## 2023-10-05 RX ORDER — DEXTROSE 50 % IN WATER 50 %
12.5 SYRINGE (ML) INTRAVENOUS ONCE
Refills: 0 | Status: DISCONTINUED | OUTPATIENT
Start: 2023-10-05 | End: 2023-10-09

## 2023-10-05 RX ORDER — LEVOTHYROXINE SODIUM 125 MCG
75 TABLET ORAL EVERY 24 HOURS
Refills: 0 | Status: DISCONTINUED | OUTPATIENT
Start: 2023-10-05 | End: 2023-10-09

## 2023-10-05 RX ORDER — PIPERACILLIN AND TAZOBACTAM 4; .5 G/20ML; G/20ML
4.5 INJECTION, POWDER, LYOPHILIZED, FOR SOLUTION INTRAVENOUS ONCE
Refills: 0 | Status: DISCONTINUED | OUTPATIENT
Start: 2023-10-06 | End: 2023-10-06

## 2023-10-05 RX ORDER — PIPERACILLIN AND TAZOBACTAM 4; .5 G/20ML; G/20ML
4.5 INJECTION, POWDER, LYOPHILIZED, FOR SOLUTION INTRAVENOUS EVERY 8 HOURS
Refills: 0 | Status: DISCONTINUED | OUTPATIENT
Start: 2023-10-06 | End: 2023-10-06

## 2023-10-05 RX ORDER — DEXTROSE 50 % IN WATER 50 %
25 SYRINGE (ML) INTRAVENOUS ONCE
Refills: 0 | Status: DISCONTINUED | OUTPATIENT
Start: 2023-10-05 | End: 2023-10-09

## 2023-10-05 RX ORDER — INSULIN LISPRO 100/ML
VIAL (ML) SUBCUTANEOUS AT BEDTIME
Refills: 0 | Status: DISCONTINUED | OUTPATIENT
Start: 2023-10-05 | End: 2023-10-09

## 2023-10-05 RX ORDER — DEXTROSE 50 % IN WATER 50 %
15 SYRINGE (ML) INTRAVENOUS ONCE
Refills: 0 | Status: DISCONTINUED | OUTPATIENT
Start: 2023-10-05 | End: 2023-10-09

## 2023-10-05 RX ORDER — SODIUM CHLORIDE 9 MG/ML
1000 INJECTION, SOLUTION INTRAVENOUS ONCE
Refills: 0 | Status: COMPLETED | OUTPATIENT
Start: 2023-10-05 | End: 2023-10-05

## 2023-10-05 RX ORDER — INFLUENZA VIRUS VACCINE 15; 15; 15; 15 UG/.5ML; UG/.5ML; UG/.5ML; UG/.5ML
0.7 SUSPENSION INTRAMUSCULAR ONCE
Refills: 0 | Status: DISCONTINUED | OUTPATIENT
Start: 2023-10-05 | End: 2023-10-09

## 2023-10-05 RX ORDER — ACETAMINOPHEN 500 MG
650 TABLET ORAL ONCE
Refills: 0 | Status: COMPLETED | OUTPATIENT
Start: 2023-10-05 | End: 2023-10-05

## 2023-10-05 RX ORDER — AZITHROMYCIN 500 MG/1
500 TABLET, FILM COATED ORAL EVERY 24 HOURS
Refills: 0 | Status: DISCONTINUED | OUTPATIENT
Start: 2023-10-05 | End: 2023-10-06

## 2023-10-05 RX ORDER — PIPERACILLIN AND TAZOBACTAM 4; .5 G/20ML; G/20ML
4.5 INJECTION, POWDER, LYOPHILIZED, FOR SOLUTION INTRAVENOUS ONCE
Refills: 0 | Status: COMPLETED | OUTPATIENT
Start: 2023-10-06 | End: 2023-10-06

## 2023-10-05 RX ADMIN — Medication 75 MICROGRAM(S): at 07:09

## 2023-10-05 RX ADMIN — Medication 650 MILLIGRAM(S): at 21:11

## 2023-10-05 RX ADMIN — Medication 650 MILLIGRAM(S): at 22:11

## 2023-10-05 RX ADMIN — Medication 650 MILLIGRAM(S): at 12:10

## 2023-10-05 RX ADMIN — AZITHROMYCIN 500 MILLIGRAM(S): 500 TABLET, FILM COATED ORAL at 06:17

## 2023-10-05 RX ADMIN — Medication 2: at 13:01

## 2023-10-05 RX ADMIN — PIPERACILLIN AND TAZOBACTAM 200 GRAM(S): 4; .5 INJECTION, POWDER, LYOPHILIZED, FOR SOLUTION INTRAVENOUS at 22:20

## 2023-10-05 RX ADMIN — ENOXAPARIN SODIUM 40 MILLIGRAM(S): 100 INJECTION SUBCUTANEOUS at 19:15

## 2023-10-05 RX ADMIN — Medication 650 MILLIGRAM(S): at 13:49

## 2023-10-05 RX ADMIN — Medication 0: at 22:18

## 2023-10-05 RX ADMIN — CEFTRIAXONE 100 MILLIGRAM(S): 500 INJECTION, POWDER, FOR SOLUTION INTRAMUSCULAR; INTRAVENOUS at 06:17

## 2023-10-05 RX ADMIN — Medication 650 MILLIGRAM(S): at 03:33

## 2023-10-05 RX ADMIN — SODIUM CHLORIDE 1000 MILLILITER(S): 9 INJECTION, SOLUTION INTRAVENOUS at 07:09

## 2023-10-05 RX ADMIN — Medication 2: at 19:16

## 2023-10-05 NOTE — H&P ADULT - PROBLEM SELECTOR PLAN 6
Plan:  F: s/p 1L NS in the ED   E: replete K<4, Mg<2  N: Consistent Carb  VTE Prophylaxis: Lovenox   GI: None  C: Full Code  D: F

## 2023-10-05 NOTE — H&P ADULT - NSHPPHYSICALEXAM_GEN_ALL_CORE
T(C): 38.5 (10-05-23 @ 03:23), Max: 38.5 (10-05-23 @ 03:23)  HR: 100 (10-05-23 @ 03:23) (80 - 100)  BP: 116/71 (10-05-23 @ 03:23) (114/74 - 169/74)  RR: 16 (10-05-23 @ 03:23) (16 - 18)  SpO2: 95% (10-05-23 @ 03:25) (91% - 96%)    PHYSICAL EXAM:  General: AAOx3, no acute distress, laying in bed comfortably  HEENT: head NCAT, eyes EOMI, PERRLA, no conjunctival pallor, no scleral icterus, no oropharyngeal exudates, erythema, or lesions, moist mucous membranes  Neck: supple, non-tender, no cervical LAD, no thyromegaly  Pulmonary: lung fields CTAB, no wheezing, rales, or rhonchi, no tenderness to palpation, no dullness to percussion, tactile fremitus WNL  Cardiovascular: RRR, normal S1/S2, no m/r/g, no JVD, no carotid bruits b/l, PMI not displaced  Abdominal: soft, NTND, +BS, no hepatosplenomegaly, no CVA tenderness, no masses, no bruits  Genitourinary:  Extremities: no peripheral edema or cyanosis, pulses 2+ in upper and lower extremities b/l, capillary refill < 2 sec.  Neuro: CN II-XII grossly intact and symmetric b/l, motor, sensory, coordination grossly intact in all extremities, DTR 2+ and symmetric b/l  Skin: warm, dry, no visible jaundice, no new rashes T(C): 38.5 (10-05-23 @ 03:23), Max: 38.5 (10-05-23 @ 03:23)  HR: 100 (10-05-23 @ 03:23) (80 - 100)  BP: 116/71 (10-05-23 @ 03:23) (114/74 - 169/74)  RR: 16 (10-05-23 @ 03:23) (16 - 18)  SpO2: 95% (10-05-23 @ 03:25) (91% - 96%)    PHYSICAL EXAM:  General: AAOx3, no acute distress, laying in bed comfortably  HEENT: head NCAT, eyes EOMI, PERRLA, no conjunctival pallor, no scleral icterus, moist mucous membranes  Pulmonary: lung fields CTAB, no wheezing, rales, or rhonchi  Cardiovascular: RRR, normal S1/S2, no m/r/g, no JVD  Abdominal: soft, NTND, +BS, no hepatosplenomegaly, no CVA tenderness  Extremities: 1+ pitting edema to knees, pulses 2+ in upper and lower extremities b/l  Neuro: CN II-XII grossly intact and symmetric b/l, motor, sensory, coordination grossly intact in all extremities  Skin: warm, dry, no visible jaundice, no new rashes

## 2023-10-05 NOTE — H&P ADULT - PROBLEM SELECTOR PLAN 5
Plan:  F: s/p 1L NS in the ED   E: replete K<4, Mg<2  N: regular  VTE Prophylaxis: Lovenox   GI: None  C: Full Code  D: BELKIS Plan:  F: s/p 1L NS in the ED   E: replete K<4, Mg<2  N: Consistent Carb  VTE Prophylaxis: Lovenox   GI: None  C: Full Code  D: F Hx of HLD. No longer on medications.     LENNOX

## 2023-10-05 NOTE — H&P ADULT - ASSESSMENT
89F Slovenian-speaking with PMHx hypothyroidism, HLD, DM presents to [facility] ED with fever, HA and generalized weakness x 1d, admitted for sepsis 2/2 likely PNA.

## 2023-10-05 NOTE — H&P ADULT - PROBLEM SELECTOR PLAN 2
Plan:  F: s/p 1L NS in the ED   E: replete K<4, Mg<2  N: regular  VTE Prophylaxis: Lovenox   GI: None  C: Full Code  D: BELKIS Hx of hypothyroidism. On levothyroxine 75mcg qd.     Plan:  - c/w levothyroxine 75mcg qd  (no need to obtain TSH now as pt has acute infection and will likely not be accurate)

## 2023-10-05 NOTE — CONSULT NOTE ADULT - SUBJECTIVE AND OBJECTIVE BOX
Patient is a 89y old  Female who presents with a chief complaint of Weakness (05 Oct 2023 04:29)      HPI:  HPI: 89F Hungarian-speaking with PMHx hypothyroidism, HLD, DM presents to [facility] ED with fever, HA and generalized weakness x 1d. Pt states she started to feel weak with a fever yesterday, when her daughter brought her to the ED. She states she has had a chronic dry cough and rhinorrhea since she was admitted to [facility] with COVID PNA in 1/2022. Otherwise, denies worsening cough, chills, shortness of breath, dysuria, abdominal pain.     In the ED:  Initial vital signs: T: 100.8 F, HR: 93, BP: 138/84, R: 18, SpO2: 95% on RA  ED course:   Labs: significant for WBC 11, neutrophilic predominance, bilirubin 2.2. UA negative. Rapid RVP negative.   Imaging:  CXR:  Right perihilar opacities may represent pneumonia.  EKG: sinus tachycardia with 1st degree AV block  Medications: cefepime 1g x1, Tylenol, 1LNS   Consults: none      (05 Oct 2023 04:29)    PAST MEDICAL & SURGICAL HISTORY:  DM (diabetes mellitus)      HLD (hyperlipidemia)      Hypothyroid      No significant past surgical history        MEDICATIONS  (STANDING):  azithromycin   Tablet 500 milliGRAM(s) Oral every 24 hours  cefTRIAXone   IVPB 1000 milliGRAM(s) IV Intermittent every 24 hours  dextrose 5%. 1000 milliLiter(s) (100 mL/Hr) IV Continuous <Continuous>  dextrose 5%. 1000 milliLiter(s) (50 mL/Hr) IV Continuous <Continuous>  dextrose 50% Injectable 25 Gram(s) IV Push once  dextrose 50% Injectable 12.5 Gram(s) IV Push once  dextrose 50% Injectable 25 Gram(s) IV Push once  glucagon  Injectable 1 milliGRAM(s) IntraMuscular once  influenza  Vaccine (HIGH DOSE) 0.7 milliLiter(s) IntraMuscular once  insulin lispro (ADMELOG) corrective regimen sliding scale   SubCutaneous three times a day before meals  insulin lispro (ADMELOG) corrective regimen sliding scale   SubCutaneous at bedtime  levothyroxine 75 MICROGram(s) Oral every 24 hours    MEDICATIONS  (PRN):  acetaminophen     Tablet .. 650 milliGRAM(s) Oral every 6 hours PRN Temp greater or equal to 38C (100.4F), Mild Pain (1 - 3)  dextrose Oral Gel 15 Gram(s) Oral once PRN Blood Glucose LESS THAN 70 milliGRAM(s)/deciliter            FAMILY HISTORY:  No pertinent family history in first degree relatives        CBC Full  -  ( 05 Oct 2023 10:00 )  WBC Count : 10.89 K/uL  RBC Count : 4.02 M/uL  Hemoglobin : 11.8 g/dL  Hematocrit : 37.5 %  Platelet Count - Automated : 152 K/uL  Mean Cell Volume : 93.3 fl  Mean Cell Hemoglobin : 29.4 pg  Mean Cell Hemoglobin Concentration : 31.5 gm/dL  Auto Neutrophil # : 8.72 K/uL  Auto Lymphocyte # : 1.01 K/uL  Auto Monocyte # : 1.07 K/uL  Auto Eosinophil # : 0.00 K/uL  Auto Basophil # : 0.02 K/uL  Auto Neutrophil % : 80.1 %  Auto Lymphocyte % : 9.3 %  Auto Monocyte % : 9.8 %  Auto Eosinophil % : 0.0 %  Auto Basophil % : 0.2 %      10-05    132<L>  |  97  |  11  ----------------------------<  158<H>  3.9   |  19<L>  |  0.69    Ca    9.1      05 Oct 2023 10:00  Phos  3.0     10-05  Mg     1.8     10-05    TPro  6.8  /  Alb  3.3  /  TBili  1.3<H>  /  DBili  x   /  AST  13  /  ALT  8<L>  /  AlkPhos  75  10-05      Urinalysis Basic - ( 05 Oct 2023 10:00 )    Color: x / Appearance: x / SG: x / pH: x  Gluc: 158 mg/dL / Ketone: x  / Bili: x / Urobili: x   Blood: x / Protein: x / Nitrite: x   Leuk Esterase: x / RBC: x / WBC x   Sq Epi: x / Non Sq Epi: x / Bacteria: x        Radiology :     < from: Xray Chest 1 View AP/PA (10.04.23 @ 17:51) >    ACC: [id number] EXAM:  XR CHEST AP OR PA 1V   ORDERED BY: IVETH MALAGON     PROCEDURE DATE:  10/04/2023          INTERPRETATION:  History: Fever .    Technique: Single view of the chest was performed.    Comparisons: Comparison is made with prior study performed on 5/23/2022.    Findings: Low lung volumes limit evaluation.    The cardiac silhouette is not enlarged. There are calcifications in the   aortic arch.    The mediastinum does not appear widened.    No pneumothorax is visualized.    No pneumomediastinum is visualized.    No pleural effusions visualized.    There are right perihilar opacities.    No acute fracture is visualized.    Impression: Right perihilar opacities may represent pneumonia. Follow-up   is recommended to document resolution and exclude other etiologies.              Review of Systems : per HPI         Vital Signs Last 24 Hrs  T(C): 38.9 (05 Oct 2023 12:10), Max: 38.9 (05 Oct 2023 12:10)  T(F): 102 (05 Oct 2023 12:10), Max: 102 (05 Oct 2023 12:10)  HR: 88 (05 Oct 2023 09:12) (80 - 100)  BP: 109/56 (05 Oct 2023 09:12) (104/60 - 169/74)  BP(mean): --  RR: 18 (05 Oct 2023 09:12) (16 - 19)  SpO2: 95% (05 Oct 2023 09:12) (91% - 96%)    Parameters below as of 05 Oct 2023 09:12  Patient On (Oxygen Delivery Method): room air            Physical Exam :  89 y o woman lying comfortably in semi Rush's position , awake , alert , no acute complaints     Head : normocephalic , atraumatic    Eyes : PERRLA , EOMI , no nystagmus , sclera anicteric    ENT / FACE : neg nasal discharge , uvula midline , no oropharyngeal erythema / exudate    Neck : supple , negative JVD , negative carotid bruits , no thyromegaly    Chest : coarse bs     Cardiovascular : regular rate and rhythm , neg murmurs / rubs / gallops    Abdomen : soft , non distended , non tender to palpation in all 4 quadrants ,  normal bowel sounds     Extremities : WWP , neg cyanosis /clubbing / edema     Neurologic Exam :     Alert and oriented to person , place        Motor Exam:                Right UE:                     no focal weakness ,  > 3+/5 throughout     Left UE:                       no focal weakness ,  > 3+/5 throughout          Right LE:          no focal weakness ,  > 3+/5 throughout          Left LE:          no focal weakness ,  > 3+/5 throughout           Sensation :         intact to light touch x 4 extremities                                 DTR :           biceps/brachioradialis : equal                            patella/ankle : equal             Gait :  not tested          PM&R Impression : admitted for  generalized weakness x 1d, admitted for sepsis 2/2 likely PNA    - deconditioned    - no focal weakness       Recommendations / Plan :       1) Physical / Occupational therapy focusing on therapeutic exercises , equipment evaluation , bed mobility/transfer out of bed evaluation , progressive ambulation with assistive devices prn .    2) Current disposition plan recommendation  :    pending functional progress

## 2023-10-05 NOTE — H&P ADULT - PROBLEM SELECTOR PLAN 4
Hx of HLD. No longer on medications.     LENNOX On repaglinide 2mg TID.     Plan:  - mISS  - f/u A1c

## 2023-10-05 NOTE — H&P ADULT - HISTORY OF PRESENT ILLNESS
HPI:    In the ED:  Initial vital signs: T: XX F, HR: XX, BP: XX, R: XX, SpO2: XX% on RA  ED course:   Labs: significant for  Imaging:  CXR:   EKG:   Medications:   Consults: none      HPI:    In the ED:  Initial vital signs: T: 100.8 F, HR: 93, BP: 138/84, R: 18, SpO2: 95% on RA  ED course:   Labs: significant for WBC 11, neutrophilic predominance, bilirubin 2.2. UA negative. Rapid RVP negative.   Imaging:  CXR:  Right perihilar opacities may represent pneumonia.  EKG: sinus tachycardia with 1st degree AV block  Medications: cefepime 1g x1, Tylenol, 1LNS   Consults: none      HPI: 89F with PMHx hypothyroidism, DM, HLD presents to [facility] ED with fever, HA and generalized weakness x 1d.     In the ED:  Initial vital signs: T: 100.8 F, HR: 93, BP: 138/84, R: 18, SpO2: 95% on RA  ED course:   Labs: significant for WBC 11, neutrophilic predominance, bilirubin 2.2. UA negative. Rapid RVP negative.   Imaging:  CXR:  Right perihilar opacities may represent pneumonia.  EKG: sinus tachycardia with 1st degree AV block  Medications: cefepime 1g x1, Tylenol, 1LNS   Consults: none      HPI: 89F Vincentian-speaking with PMHx hypothyroidism, DM presents to [facility] ED with fever, HA and generalized weakness x 1d. Pt states she started to feel weak with a fever yesterday, when her daughter brought her to the ED. She states she has had a chronic dry cough and rhinorrhea since she was admitted to [facility] with COVID PNA in 1/2022. Otherwise, denies worsening cough, chills, shortness of breath, dysuria, abdominal pain.     In the ED:  Initial vital signs: T: 100.8 F, HR: 93, BP: 138/84, R: 18, SpO2: 95% on RA  ED course:   Labs: significant for WBC 11, neutrophilic predominance, bilirubin 2.2. UA negative. Rapid RVP negative.   Imaging:  CXR:  Right perihilar opacities may represent pneumonia.  EKG: sinus tachycardia with 1st degree AV block  Medications: cefepime 1g x1, Tylenol, 1LNS   Consults: none      HPI: 89F Tongan-speaking with PMHx hypothyroidism, HLD, DM presents to [facility] ED with fever, HA and generalized weakness x 1d. Pt states she started to feel weak with a fever yesterday, when her daughter brought her to the ED. She states she has had a chronic dry cough and rhinorrhea since she was admitted to [facility] with COVID PNA in 1/2022. Otherwise, denies worsening cough, chills, shortness of breath, dysuria, abdominal pain.     In the ED:  Initial vital signs: T: 100.8 F, HR: 93, BP: 138/84, R: 18, SpO2: 95% on RA  ED course:   Labs: significant for WBC 11, neutrophilic predominance, bilirubin 2.2. UA negative. Rapid RVP negative.   Imaging:  CXR:  Right perihilar opacities may represent pneumonia.  EKG: sinus tachycardia with 1st degree AV block  Medications: cefepime 1g x1, Tylenol, 1LNS   Consults: none

## 2023-10-05 NOTE — H&P ADULT - PROBLEM SELECTOR PLAN 3
On repaglinide 2mg TID.     Plan:  - mISS  - f/u A1c Hx of hypothyroidism. On levothyroxine 75mcg qd.     Plan:  - c/w levothyroxine 75mcg qd  (no need to obtain TSH now as pt has acute infection and will likely not be accurate)

## 2023-10-05 NOTE — H&P ADULT - NSHPREVIEWOFSYSTEMS_GEN_ALL_CORE
REVIEW OF SYSTEMS:    CONSTITUTIONAL: Patient denies weakness, fevers or chills  EYES/ENT: Patient denies visual changes;  denies vertigo or throat pain   NECK: Patient denies pain or stiffness  RESPIRATORY: Patient denies cough, wheezing, hemoptysis; denies shortness of breath  CARDIOVASCULAR: Patient denies chest pain or palpitations  GASTROINTESTINAL: Patient denies abdominal or epigastric pain, nausea, vomiting, or hematemesis, diarrhea or constipation, melena or hematochezia.  GENITOURINARY: Patient denies dysuria, frequency or hematuria  NEUROLOGICAL: Patient denies numbness or weakness  SKIN: Patient denies itching, burning, rashes, or lesions   All other review of systems is negative unless indicated above. REVIEW OF SYSTEMS:    CONSTITUTIONAL: +weakness, fever. Patient denies chills  EYES/ENT: Patient denies visual changes;  denies vertigo or throat pain   NECK: Patient denies pain or stiffness  RESPIRATORY: +cough, Patient denies wheezing, hemoptysis; denies shortness of breath  CARDIOVASCULAR: Patient denies chest pain or palpitations  GASTROINTESTINAL: Patient denies abdominal or epigastric pain, nausea, vomiting, or hematemesis, diarrhea or constipation, melena or hematochezia.  GENITOURINARY: Patient denies dysuria, frequency or hematuria  NEUROLOGICAL: Patient denies numbness or weakness  SKIN: Patient denies itching, burning, rashes, or lesions   All other review of systems is negative unless indicated above.

## 2023-10-05 NOTE — CONSULT NOTE ADULT - ASSESSMENT
{\rtf1\mejfve37788\ansi\hhbuict4151\ftnbj\uc1\deff0  {\fonttbl{\f0 \fnil Segoe UI;}{\f1 \fnil \fcharset0 Segoe UI;}{\f2 \fnil Times New Bud;}}  {\colortbl ;\mcl936\tbrdi844\gubd558 ;\red0\green0\blue0 ;\red0\green0\mzbg398 ;\red0\green0\blue0 ;}  {\stylesheet{\f0\fs20 Normal;}{\cs1 Default Paragraph Font;}{\cs2\f0\fs16 Line Number;}{\cs3\f2\fs24\ul\cf3 Hyperlink;}}  {\*\revtbl{Unknown;}}  \ydrjbm19873\tnelcj78122\plhvi9140\oimpn7602\duofq3880\wyjmq0917\eqhlvhk502\otlfybl766\nogrowautofit\kwvedr592\formshade\nofeaturethrottle1\dntblnsbdb\fet4\aendnotes\aftnnrlc\pgbrdrhead\pgbrdrfoot  \sectd\fvzknt63140\xmpuyc39646\guttersxn0\gmlglsim3661\gcahmtbx3661\hbfklxqq1089\xiqvfafm9866\swkyems003\jwujfnh763\sbkpage\pgncont\pgndec  \plain\plain\f0\fs24\ql\plain\f0\fs24\plain\f0\fs20\ekqg3435\hich\f0\dbch\f0\loch\f0\fs20\par  I M\par  \par  89 y o F [place]an-speaking with PMHx hypothyroidism, HLD, DM presents to [facility] ED with fever, HA and generalized weakness x 1d, admitted for sepsis 2/2 likely PNA. \par  \par  \plain\f1\fs20\yowo3846\hich\f1\dbch\f1\loch\f1\cf2\fs20\ul{\field{\*\fldinst HYPERLINK [id number] }{\fldrslt Problem/Plan - 1:}}\plain\f0\fs20\gtyd4993\hich\f0\dbch\f0\loch\f0\fs20\ql\par  \'b7  {\*\bkmkstart jh79078334611}{\*\bkmkend lp32014356145}Problem: {\*\bkmkstart hj62688866114}{\*\bkmkend bh89315328861}Sepsis. \par  \'b7  {\*\bkmkstart nj05934549152}{\*\bkmkend po17962472412}Plan: {\*\bkmkstart yx29613218302}{\*\bkmkend oo84794815861}Presented with 2/4 SIRS: T100.8, HR 93. WBC 11. COVID/flu/RSV negative. UA negative. \par  CXR: Right perihilar opacities\par  DDx: likely bacterial PNA, CAP vs viral PNA - favor CAP given unilateral. No recent hospitalizations or c/f HAP. \par  S/p cefepime 1g in ED\par  \par  Plan:\par  - f/u BCx\par  - obtain Ulegionella, Ustrep\par  - obtain full RVP\par  - start CTX 1g qd x 4d (total 5d Abx)\par  - c/w Azithromycin 500mg qd (de-escalate pending ULegionella).\plain\f1\fs20\xpsj6190\hich\f1\dbch\f1\loch\f1\cf2\fs20\strike\plain\f0\fs20\qjsj0494\hich\f0\dbch\f0\loch\f0\fs20\par  \par  \plain\f1\fs20\bzkk1484\hich\f1\dbch\f1\loch\f1\cf2\fs20\ul{\field{\*\fldinst HYPERLINK [id number] }{\fldrslt Problem/Plan - 2:}}\plain\f0\fs20\atzv4757\hich\f0\dbch\f0\loch\f0\fs20\ql\par  \'b7  {\*\bkmkstart xp14572250691}{\*\bkmkend ku65175405940}Problem: {\*\bkmkstart yh93485436256}{\*\bkmkend kd63203400762}Hypothyroidism. \par  \'b7  {\*\bkmkstart mn94343636849}{\*\bkmkend jm11151764238}Plan: {\*\bkmkstart lm31170269325}{\*\bkmkend lx80183605430}Hx of hypothyroidism. On levothyroxine 75mcg qd. \par  \par  Plan:\par  - c/w levothyroxine 75mcg qd\par  (no need to obtain TSH now as pt has acute infection and will likely not be accurate).\par  \par  \plain\f1\fs20\gzsn3963\hich\f1\dbch\f1\loch\f1\cf2\fs20\ul{\field{\*\fldinst HYPERLINK [id number] }{\fldrslt Problem/Plan - 3:}}\plain\f0\fs20\grxu4208\hich\f0\dbch\f0\loch\f0\fs20\ql\par  \'b7  {\*\bkmkstart wz44840163079}{\*\bkmkend jt21950057277}Problem: {\*\bkmkstart va88384333759}{\*\bkmkend gu97912748727}Type 2 diabetes mellitus. \par  \'b7  {\*\bkmkstart zl47039826241}{\*\bkmkend sy49104059495}Plan: {\*\bkmkstart oq86630913802}{\*\bkmkend aa06098253510}On repaglinide 2mg TID. \par  \par  Plan:\par  - mISS\par  - f/u A1c.\par  \par  \plain\f1\fs20\jlmh4930\hich\f1\dbch\f1\loch\f1\cf2\fs20\ul{\field{\*\fldinst HYPERLINK [id number] }{\fldrslt Problem/Plan - 4:}}\plain\f0\fs20\bltg6282\hich\f0\dbch\f0\loch\f0\fs20\ql\par  \'b7  {\*\bkmkstart to94263004209}{\*\bkmkend pq44406360413}Problem: {\*\bkmkstart fp18488926415}{\*\bkmkend xu45407394580}Hyperlipidemia. \par  \'b7  {\*\bkmkstart ta37360617784}{\*\bkmkend tl56128000358}Plan: {\*\bkmkstart nn67471844481}{\*\bkmkend of26480957244}Hx of HLD. No longer on medications.\par   \par  LENNOX.\par  \par  \plain\f1\fs20\iimw6578\hich\f1\dbch\f1\loch\f1\cf2\fs20\ul{\field{\*\fldinst HYPERLINK [id number] }{\fldrslt Problem/Plan - 5:}}\plain\f0\fs20\uuzj7197\hich\f0\dbch\f0\loch\f0\fs20\ql\par  \'b7  {\*\bkmkstart hr95272956270}{\*\bkmkend js42199575562}Problem: {\*\bkmkstart zv91089135344}{\*\bkmkend wi53187086143}Prophylactic measure. \par  \'b7  {\*\bkmkstart ot03923211210}{\*\bkmkend ma00632666754}Plan: {\*\bkmkstart ha55631753604}{\*\bkmkend db53434722986}Plan:\par  F: s/p 1L NS in the ED \par  E: replete K<4, Mg<2\par  N: Consistent Carb\par  VTE Prophylaxis: Lovenox \par  GI: None\par  C: Full Code\par  D: RMF.\plain\f1\fs20\ybqg5846\hich\f1\dbch\f1\loch\f1\cf2\fs20\strike\plain\f0\fs20\nswz4211\hich\f0\dbch\f0\loch\f0\fs20\par  \par  }

## 2023-10-05 NOTE — ED ADULT NURSE REASSESSMENT NOTE - NS ED NURSE REASSESS COMMENT FT1
pt assisted to bathroom with stand by assist.
pt endorsed to me in NAD, daughter at bedside. has no current c/o pain, will continue to monitor.
Received Pt from previous RN awaiting transfer to [facility].

## 2023-10-05 NOTE — H&P ADULT - NSHPLABSRESULTS_GEN_ALL_CORE
.  LABS:                         12.2   11.06 )-----------( 197      ( 04 Oct 2023 16:50 )             38.2     10-04    136  |  99  |  12  ----------------------------<  149<H>  3.8   |  28  |  0.82    Ca    9.2      04 Oct 2023 16:50    TPro  7.5  /  Alb  3.6  /  TBili  2.2<H>  /  DBili  x   /  AST  15  /  ALT  18  /  AlkPhos  71  10-04    PT/INR - ( 04 Oct 2023 16:50 )   PT: 12.8 sec;   INR: 1.17          PTT - ( 04 Oct 2023 16:50 )  PTT:39.4 sec  Urinalysis Basic - ( 04 Oct 2023 16:50 )    Color: Yellow / Appearance: Clear / S.018 / pH: x  Gluc: 149 mg/dL / Ketone: 80 mg/dL  / Bili: Negative / Urobili: 1.0 mg/dL   Blood: x / Protein: 30 mg/dL / Nitrite: Negative   Leuk Esterase: Trace / RBC: 0 /HPF / WBC 4 /HPF   Sq Epi: x / Non Sq Epi: x / Bacteria: present /HPF                RADIOLOGY, EKG & ADDITIONAL TESTS: Reviewed.

## 2023-10-05 NOTE — H&P ADULT - PROBLEM SELECTOR PLAN 1
Presented with 2/4 SIRS: T100.8, HR 93. WBC 11. COVID/flu/RSV negative. UA negative.   CXR: Right perihilar opacities  DDx: likely bacterial PNA, CAP vs viral PNA    Plan:  - obtain Blanca Gaspar  - obtain full RVP  - start CTX 1g qd x 4d (total 5d Abx)  - c/w Azithromycin 500mg qd (de-escalate pending Dianne) Presented with 2/4 SIRS: T100.8, HR 93. WBC 11. COVID/flu/RSV negative. UA negative.   CXR: Right perihilar opacities  DDx: likely bacterial PNA, CAP vs viral PNA    Plan:  - f/u BCx  - obtain Ulegionella, Ustrep  - obtain full RVP  - start CTX 1g qd x 4d (total 5d Abx)  - c/w Azithromycin 500mg qd (de-escalate pending ULegionella) Presented with 2/4 SIRS: T100.8, HR 93. WBC 11. COVID/flu/RSV negative. UA negative.   CXR: Right perihilar opacities  DDx: likely bacterial PNA, CAP vs viral PNA - favor CAP given unilateral. No recent hospitalizations or c/f HAP.   S/p cefepime 1g in ED    Plan:  - f/u BCx  - obtain Ulegionella, Ustrep  - obtain full RVP  - start CTX 1g qd x 4d (total 5d Abx)  - c/w Azithromycin 500mg qd (de-escalate pending ULegionella)

## 2023-10-05 NOTE — H&P ADULT - ATTENDING COMMENTS
Patient was seen and examined at bedside on 10/5/2023 at 12 pm with daughter at bedside. Patient reports that she feels improved. Denies SOB, CP. Although reports new cough. ROS is otherwise negative. Vitals, labwork and pertinent imaging reviewed. Physical exam - NAD, AAO x 4, PERRLA, EOMI, supple neck, chest - CTA b/l, CV - rrr, s1s2, no m/r/g, abd - soft, + BS, NTND, ext - WWP, MSK - no joint swelling,skin -  no rash, back - midline, psych - normal affect    Plan  -C/w Abx  -F/u cultures  -PT consult

## 2023-10-05 NOTE — PATIENT PROFILE ADULT - FALL HARM RISK - FACTORS
Patient presents to clinic experiencing dizziness, chills, numbness bilateral feet, incontinence.  Also, she has a red sore on left foot.    Medication Reconciliation: complete    Imelda Carrillo LPN       Impaired vision/Weakness

## 2023-10-06 ENCOUNTER — TRANSCRIPTION ENCOUNTER (OUTPATIENT)
Age: 88
End: 2023-10-06

## 2023-10-06 DIAGNOSIS — E80.6 OTHER DISORDERS OF BILIRUBIN METABOLISM: ICD-10-CM

## 2023-10-06 DIAGNOSIS — R50.9 FEVER, UNSPECIFIED: ICD-10-CM

## 2023-10-06 DIAGNOSIS — A41.9 SEPSIS, UNSPECIFIED ORGANISM: ICD-10-CM

## 2023-10-06 LAB
ALBUMIN SERPL ELPH-MCNC: 3.1 G/DL — LOW (ref 3.3–5)
ALP SERPL-CCNC: 64 U/L — SIGNIFICANT CHANGE UP (ref 40–120)
ALT FLD-CCNC: 7 U/L — LOW (ref 10–45)
ANION GAP SERPL CALC-SCNC: 12 MMOL/L — SIGNIFICANT CHANGE UP (ref 5–17)
AST SERPL-CCNC: 12 U/L — SIGNIFICANT CHANGE UP (ref 10–40)
BASOPHILS # BLD AUTO: 0.01 K/UL — SIGNIFICANT CHANGE UP (ref 0–0.2)
BASOPHILS NFR BLD AUTO: 0.1 % — SIGNIFICANT CHANGE UP (ref 0–2)
BILIRUB SERPL-MCNC: 0.9 MG/DL — SIGNIFICANT CHANGE UP (ref 0.2–1.2)
BUN SERPL-MCNC: 12 MG/DL — SIGNIFICANT CHANGE UP (ref 7–23)
CALCIUM SERPL-MCNC: 8.5 MG/DL — SIGNIFICANT CHANGE UP (ref 8.4–10.5)
CHLORIDE SERPL-SCNC: 98 MMOL/L — SIGNIFICANT CHANGE UP (ref 96–108)
CO2 SERPL-SCNC: 24 MMOL/L — SIGNIFICANT CHANGE UP (ref 22–31)
CREAT SERPL-MCNC: 0.82 MG/DL — SIGNIFICANT CHANGE UP (ref 0.5–1.3)
CULTURE RESULTS: SIGNIFICANT CHANGE UP
EGFR: 66 ML/MIN/1.73M2 — SIGNIFICANT CHANGE UP
EOSINOPHIL # BLD AUTO: 0 K/UL — SIGNIFICANT CHANGE UP (ref 0–0.5)
EOSINOPHIL NFR BLD AUTO: 0 % — SIGNIFICANT CHANGE UP (ref 0–6)
GLUCOSE BLDC GLUCOMTR-MCNC: 136 MG/DL — HIGH (ref 70–99)
GLUCOSE BLDC GLUCOMTR-MCNC: 156 MG/DL — HIGH (ref 70–99)
GLUCOSE BLDC GLUCOMTR-MCNC: 170 MG/DL — HIGH (ref 70–99)
GLUCOSE BLDC GLUCOMTR-MCNC: 179 MG/DL — HIGH (ref 70–99)
GLUCOSE SERPL-MCNC: 174 MG/DL — HIGH (ref 70–99)
HCT VFR BLD CALC: 34.9 % — SIGNIFICANT CHANGE UP (ref 34.5–45)
HGB BLD-MCNC: 11.2 G/DL — LOW (ref 11.5–15.5)
IMM GRANULOCYTES NFR BLD AUTO: 0.5 % — SIGNIFICANT CHANGE UP (ref 0–0.9)
LEGIONELLA AG UR QL: NEGATIVE — SIGNIFICANT CHANGE UP
LYMPHOCYTES # BLD AUTO: 0.81 K/UL — LOW (ref 1–3.3)
LYMPHOCYTES # BLD AUTO: 9.2 % — LOW (ref 13–44)
MAGNESIUM SERPL-MCNC: 1.8 MG/DL — SIGNIFICANT CHANGE UP (ref 1.6–2.6)
MCHC RBC-ENTMCNC: 29.6 PG — SIGNIFICANT CHANGE UP (ref 27–34)
MCHC RBC-ENTMCNC: 32.1 GM/DL — SIGNIFICANT CHANGE UP (ref 32–36)
MCV RBC AUTO: 92.1 FL — SIGNIFICANT CHANGE UP (ref 80–100)
MONOCYTES # BLD AUTO: 0.98 K/UL — HIGH (ref 0–0.9)
MONOCYTES NFR BLD AUTO: 11.1 % — SIGNIFICANT CHANGE UP (ref 2–14)
MRSA PCR RESULT.: NEGATIVE — SIGNIFICANT CHANGE UP
NEUTROPHILS # BLD AUTO: 6.96 K/UL — SIGNIFICANT CHANGE UP (ref 1.8–7.4)
NEUTROPHILS NFR BLD AUTO: 79.1 % — HIGH (ref 43–77)
NRBC # BLD: 0 /100 WBCS — SIGNIFICANT CHANGE UP (ref 0–0)
PHOSPHATE SERPL-MCNC: 2.4 MG/DL — LOW (ref 2.5–4.5)
PLATELET # BLD AUTO: 171 K/UL — SIGNIFICANT CHANGE UP (ref 150–400)
POTASSIUM SERPL-MCNC: 3.3 MMOL/L — LOW (ref 3.5–5.3)
POTASSIUM SERPL-SCNC: 3.3 MMOL/L — LOW (ref 3.5–5.3)
PROT SERPL-MCNC: 5.9 G/DL — LOW (ref 6–8.3)
RBC # BLD: 3.79 M/UL — LOW (ref 3.8–5.2)
RBC # FLD: 13.4 % — SIGNIFICANT CHANGE UP (ref 10.3–14.5)
S AUREUS DNA NOSE QL NAA+PROBE: NEGATIVE — SIGNIFICANT CHANGE UP
S PNEUM AG UR QL: NEGATIVE — SIGNIFICANT CHANGE UP
SODIUM SERPL-SCNC: 134 MMOL/L — LOW (ref 135–145)
SPECIMEN SOURCE: SIGNIFICANT CHANGE UP
WBC # BLD: 8.8 K/UL — SIGNIFICANT CHANGE UP (ref 3.8–10.5)
WBC # FLD AUTO: 8.8 K/UL — SIGNIFICANT CHANGE UP (ref 3.8–10.5)

## 2023-10-06 PROCEDURE — 99233 SBSQ HOSP IP/OBS HIGH 50: CPT | Mod: GC

## 2023-10-06 PROCEDURE — 71048 X-RAY EXAM CHEST 4+ VIEWS: CPT | Mod: 26

## 2023-10-06 RX ORDER — MAGNESIUM SULFATE 500 MG/ML
1 VIAL (ML) INJECTION ONCE
Refills: 0 | Status: COMPLETED | OUTPATIENT
Start: 2023-10-06 | End: 2023-10-06

## 2023-10-06 RX ORDER — CEFTRIAXONE 500 MG/1
2000 INJECTION, POWDER, FOR SOLUTION INTRAMUSCULAR; INTRAVENOUS EVERY 24 HOURS
Refills: 0 | Status: DISCONTINUED | OUTPATIENT
Start: 2023-10-06 | End: 2023-10-09

## 2023-10-06 RX ORDER — POTASSIUM PHOSPHATE, MONOBASIC POTASSIUM PHOSPHATE, DIBASIC 236; 224 MG/ML; MG/ML
30 INJECTION, SOLUTION INTRAVENOUS ONCE
Refills: 0 | Status: COMPLETED | OUTPATIENT
Start: 2023-10-06 | End: 2023-10-06

## 2023-10-06 RX ADMIN — ENOXAPARIN SODIUM 40 MILLIGRAM(S): 100 INJECTION SUBCUTANEOUS at 15:17

## 2023-10-06 RX ADMIN — Medication 650 MILLIGRAM(S): at 06:17

## 2023-10-06 RX ADMIN — POTASSIUM PHOSPHATE, MONOBASIC POTASSIUM PHOSPHATE, DIBASIC 83.33 MILLIMOLE(S): 236; 224 INJECTION, SOLUTION INTRAVENOUS at 16:56

## 2023-10-06 RX ADMIN — CEFTRIAXONE 100 MILLIGRAM(S): 500 INJECTION, POWDER, FOR SOLUTION INTRAMUSCULAR; INTRAVENOUS at 15:16

## 2023-10-06 RX ADMIN — Medication 650 MILLIGRAM(S): at 23:44

## 2023-10-06 RX ADMIN — PIPERACILLIN AND TAZOBACTAM 25 GRAM(S): 4; .5 INJECTION, POWDER, LYOPHILIZED, FOR SOLUTION INTRAVENOUS at 06:17

## 2023-10-06 RX ADMIN — Medication 2: at 09:53

## 2023-10-06 RX ADMIN — Medication 650 MILLIGRAM(S): at 07:17

## 2023-10-06 RX ADMIN — AZITHROMYCIN 500 MILLIGRAM(S): 500 TABLET, FILM COATED ORAL at 06:18

## 2023-10-06 RX ADMIN — Medication 2: at 13:39

## 2023-10-06 RX ADMIN — Medication 100 GRAM(S): at 12:52

## 2023-10-06 RX ADMIN — Medication 75 MICROGRAM(S): at 06:18

## 2023-10-06 RX ADMIN — Medication 2: at 17:45

## 2023-10-06 NOTE — PROGRESS NOTE ADULT - PROBLEM SELECTOR PLAN 7
Plan:  F: s/p 1L NS in the ED   E: replete K<4, Mg<2  N: Consistent Carb  VTE Prophylaxis: Lovenox   GI: None  C: Full Code  D: F Plan:  F: tolerating PO no fluids needed   E: replete K<4, Mg<2  N: Consistent Carb  VTE Prophylaxis: Lovenox   GI: None  C: Full Code  D: RMF

## 2023-10-06 NOTE — DISCHARGE NOTE PROVIDER - NSDCCPCAREPLAN_GEN_ALL_CORE_FT
PRINCIPAL DISCHARGE DIAGNOSIS  Diagnosis: Sepsis  Assessment and Plan of Treatment: Sepsis is a serious condition in which the body responds improperly to an infection. The infection-fighting processes turn on the body, causing the organs to work poorly.  Sepsis may progress to septic shock. This is a dramatic drop in blood pressure that can damage the lungs, kidneys, liver and other organs. When the damage is severe, it can lead to death.  Due to the severe consequences, we treated you with antibiotics to control the infection. Your symptoms have improved after treatment and we will be discharging you with antibiotics also to further control the infectious source.   It is very important to continue to take the medication as prescribed. We also recommend you to see your primary care physician within 1 weeks of discharge.      SECONDARY DISCHARGE DIAGNOSES  Diagnosis: Gram-negative pneumonia  Assessment and Plan of Treatment: You were diagnosed with pneumonia, an infection that inflames air sacs in one or both lungs, which may fill with fluid. With pneumonia, the air sacs may fill with fluid or pus. The infection can be life-threatening to infants, children, and people over 65. Symptoms include cough with phlegm or pus, fever, chills, and difficulty breathing. Antibiotics can treat many forms of pneumonia. Some forms of pneumonia can be prevented by vaccines. You were treated with antibiotics and began to show improvement. Please continue taking your antibiotic *** until ***. Please follow up with your primary care physician to check for full resolution of this problem.     PRINCIPAL DISCHARGE DIAGNOSIS  Diagnosis: Sepsis  Assessment and Plan of Treatment: Sepsis is a serious condition in which the body responds improperly to an infection. The infection-fighting processes turn on the body, causing the organs to work poorly.  Sepsis may progress to septic shock. This is a dramatic drop in blood pressure that can damage the lungs, kidneys, liver and other organs. When the damage is severe, it can lead to death.  Due to the severe consequences, we treated you with antibiotics to control the infection. Your symptoms have improved after treatment and we will be discharging you with antibiotics also to further control the infectious source.   It is very important to continue to take the medication as prescribed. We spoke to your primary providor Des Christianson NP who said you should make an appointment with him within 24 hours of discharge. Please make sure to do so.      SECONDARY DISCHARGE DIAGNOSES  Diagnosis: Gram-negative pneumonia  Assessment and Plan of Treatment: You were diagnosed with pneumonia, an infection that inflames air sacs in one or both lungs, which may fill with fluid. With pneumonia, the air sacs may fill with fluid or pus. The infection can be life-threatening to infants, children, and people over 65. Symptoms include cough with phlegm or pus, fever, chills, and difficulty breathing. Antibiotics can treat many forms of pneumonia. Some forms of pneumonia can be prevented by vaccines. You were treated with antibiotics and began to show improvement. Please continue taking your antibiotic *** until ***. Please follow up with your primary care physician to check for full resolution of this problem.     PRINCIPAL DISCHARGE DIAGNOSIS  Diagnosis: Sepsis  Assessment and Plan of Treatment: Sepsis is a serious condition in which the body responds improperly to an infection. The infection-fighting processes turn on the body, causing the organs to work poorly.  Sepsis may progress to septic shock. This is a dramatic drop in blood pressure that can damage the lungs, kidneys, liver and other organs. When the damage is severe, it can lead to death.  Due to the severe consequences, we treated you with antibiotics to control the infection. Your symptoms have improved after treatment and we will be discharging you with antibiotics also to further control the infectious source.   It is very important to continue to take the medication as prescribed. We spoke to your primary providor Des Christianson NP who said you should make an appointment with him within 24 hours of discharge. Please make sure to do so.      SECONDARY DISCHARGE DIAGNOSES  Diagnosis: Gram-negative pneumonia  Assessment and Plan of Treatment: You were diagnosed with pneumonia, an infection that inflames air sacs in one or both lungs, which may fill with fluid. With pneumonia, the air sacs may fill with fluid or pus. The infection can be life-threatening to infants, children, and people over 65. Symptoms include cough with phlegm or pus, fever, chills, and difficulty breathing. Antibiotics can treat many forms of pneumonia. Some forms of pneumonia can be prevented by vaccines. You were treated with antibiotics and began to show improvement. Please continue taking your antibiotic *** until ***. Please follow up with your primary care physician to check for full resolution of this problem.    Diagnosis: Factor V Leiden  Assessment and Plan of Treatment: You have a history of Factor V Leiden deficiency. Your daughter discussed with me that you do not take any blood thinners at home. We put you on a blood thinner in the hospital. We recommend discussing starting a blood thinner with your outpatient physician as Factor V Leiden can you put you at high risk for hypercoagualbility.     PRINCIPAL DISCHARGE DIAGNOSIS  Diagnosis: Sepsis  Assessment and Plan of Treatment: Sepsis is a serious condition in which the body responds improperly to an infection. The infection-fighting processes turn on the body, causing the organs to work poorly.  Sepsis may progress to septic shock. This is a dramatic drop in blood pressure that can damage the lungs, kidneys, liver and other organs. When the damage is severe, it can lead to death.  Due to the severe consequences, we treated you with antibiotics to control the infection. Your symptoms have improved after treatment.   Please make sure to take the rest of your medications as perscribed by your primary physician. We spoke to your primary providor Des Christianson NP who said you should make an appointment with him within 24 hours of discharge. Please make sure to do so.      SECONDARY DISCHARGE DIAGNOSES  Diagnosis: Gram-negative pneumonia  Assessment and Plan of Treatment: You were diagnosed with pneumonia, an infection that inflames air sacs in one or both lungs, which may fill with fluid. With pneumonia, the air sacs may fill with fluid or pus. The infection can be life-threatening to infants, children, and people over 65. Symptoms include cough with phlegm or pus, fever, chills, and difficulty breathing. Antibiotics can treat many forms of pneumonia. Some forms of pneumonia can be prevented by vaccines. You were treated with antibiotics and began to show improvement. Please follow up with your primary care physician to check for full resolution of this problem.    Diagnosis: Factor V Leiden  Assessment and Plan of Treatment: You have a history of Factor V Leiden deficiency. Your daughter discussed with me that you do not take any blood thinners at home. We put you on a blood thinner in the hospital. We recommend discussing starting a blood thinner with your outpatient physician as Factor V Leiden can you put you at high risk for hypercoagualbility.     PRINCIPAL DISCHARGE DIAGNOSIS  Diagnosis: Severe sepsis  Assessment and Plan of Treatment:       SECONDARY DISCHARGE DIAGNOSES  Diagnosis: Gram-negative pneumonia  Assessment and Plan of Treatment: You were diagnosed with pneumonia, an infection that inflames air sacs in one or both lungs, which may fill with fluid. With pneumonia, the air sacs may fill with fluid or pus. The infection can be life-threatening to infants, children, and people over 65. Symptoms include cough with phlegm or pus, fever, chills, and difficulty breathing. Antibiotics can treat many forms of pneumonia. Some forms of pneumonia can be prevented by vaccines. You were treated with antibiotics and began to show improvement. Please follow up with your primary care physician to check for full resolution of this problem.    Diagnosis: Factor V Leiden  Assessment and Plan of Treatment: You have a history of Factor V Leiden deficiency. Your daughter discussed with me that you do not take any blood thinners at home. We put you on a blood thinner in the hospital. We recommend discussing starting a blood thinner with your outpatient physician as Factor V Leiden can you put you at high risk for hypercoagualbility.

## 2023-10-06 NOTE — PROGRESS NOTE ADULT - PROBLEM SELECTOR PLAN 1
Presented with 2/4 SIRS: T100.8, HR 93. WBC 11. COVID/flu/RSV negative. UA negative.   CXR: Right perihilar opacities  DDx: likely bacterial PNA, CAP vs viral PNA - favor CAP given unilateral. No recent hospitalizations or c/f HAP.   Overnight febrile tmax 102.8, broadened to Zosyn.    Plan:  - BCx NGTD  - Ustrep negative, ULegionella pending  - RVP negative  - D/c zosyn and continue w/ 1g CTX for 5 days  - c/w Azithromycin 500mg qd (de-escalate pending Dianne) POA, Presented with 2/4 SIRS: T100.8, HR 93. WBC 11. w/ bilirubin > 2, COVID/flu/RSV negative. UA negative.   CXR: Right perihilar opacities  DDx: likely bacterial PNA, CAP vs viral PNA - favor CAP given unilateral. No recent hospitalizations or c/f HAP.   Overnight febrile tmax 102.8, broadened to Zosyn.    Plan:  - BCx NGTD  - Ustrep negative, ULegionella pending  - RVP negative  - D/c zosyn and continue w/ 1g CTX for 5 days  - c/w Azithromycin 500mg qd (de-escalate pending ULteresitaionella)

## 2023-10-06 NOTE — SWALLOW BEDSIDE ASSESSMENT ADULT - COMMENTS
Received awake & alert, resting in bed, reading an English newspaper when I entered the room. Assessment conducted in Romansh via SLP.

## 2023-10-06 NOTE — SWALLOW BEDSIDE ASSESSMENT ADULT - ORAL PHASE
Patient was referred to ophthalmology on 2/10/22. She has declined to schedule at this time. Patient will call in the future if and when she decides to pursue.    Within functional limits

## 2023-10-06 NOTE — DISCHARGE NOTE PROVIDER - CARE PROVIDER_API CALL
Des Christianson NP in Adult [facility]  [address]  [place] [address]  Phone: [phone]  Fax: [phone]  Follow Up Time: 1 week

## 2023-10-06 NOTE — PROGRESS NOTE ADULT - SUBJECTIVE AND OBJECTIVE BOX
Internal Medicine Progress Note  Cira Kaur, PGY-1  Pager: [phone]    ******INCOMPLETE******    Patient is a 89y old  Female who presents with a chief complaint of Weakness (05 Oct 2023 12:41)    OVERNIGHT EVENTS/INTERVAL HPI:    REVIEW OF SYSTEMS:  All other review of systems is negative unless indicated above.    OBJECTIVE:  T(C): 38.1 (10-06-23 @ 05:50), Max: 39.3 (10-05-23 @ 21:00)  HR: 84 (10-06-23 @ 05:50) (84 - 101)  BP: 130/59 (10-06-23 @ 05:50) (109/56 - 145/73)  RR: 18 (10-06-23 @ 05:50) (18 - 19)  SpO2: 92% (10-06-23 @ 05:50) (92% - 95%)  Daily Height in cm: 155 (05 Oct 2023 13:49)    Daily Weight in k.2 (05 Oct 2023 13:49)    Physical Exam:  General: in no acute distress  Eyes: EOMI intact bilaterally. Anicteric sclerae, moist conjunctivae  HENT: Moist mucous membranes  Neck: Trachea midline, supple  Lungs: CTA B/L. No wheezes, rales, or rhonchi  Cardiovascular: RRR. No murmurs, rubs, or gallops  Abdomen: Soft, non-tender non-distended; No rebound or guarding  Extremities: WWP, No clubbing, cyanosis or edema  MSK: No midline bony tenderness. No CVA tenderness bilaterally  Neurological: Alert and oriented x3  Skin: Warm and dry. No obvious rash     Medications:  MEDICATIONS  (STANDING):  azithromycin   Tablet 500 milliGRAM(s) Oral every 24 hours  dextrose 5%. 1000 milliLiter(s) (100 mL/Hr) IV Continuous <Continuous>  dextrose 5%. 1000 milliLiter(s) (50 mL/Hr) IV Continuous <Continuous>  dextrose 50% Injectable 25 Gram(s) IV Push once  dextrose 50% Injectable 25 Gram(s) IV Push once  dextrose 50% Injectable 12.5 Gram(s) IV Push once  enoxaparin Injectable 40 milliGRAM(s) SubCutaneous every 24 hours  glucagon  Injectable 1 milliGRAM(s) IntraMuscular once  influenza  Vaccine (HIGH DOSE) 0.7 milliLiter(s) IntraMuscular once  insulin lispro (ADMELOG) corrective regimen sliding scale   SubCutaneous three times a day before meals  insulin lispro (ADMELOG) corrective regimen sliding scale   SubCutaneous at bedtime  levothyroxine 75 MICROGram(s) Oral every 24 hours  piperacillin/tazobactam IVPB.- 4.5 Gram(s) IV Intermittent once  piperacillin/tazobactam IVPB.. 4.5 Gram(s) IV Intermittent every 8 hours    MEDICATIONS  (PRN):  acetaminophen     Tablet .. 650 milliGRAM(s) Oral every 6 hours PRN Temp greater or equal to 38C (100.4F), Mild Pain (1 - 3)  dextrose Oral Gel 15 Gram(s) Oral once PRN Blood Glucose LESS THAN 70 milliGRAM(s)/deciliter      Labs:                        11.1   11.21 )-----------( 176      ( 05 Oct 2023 22:16 )             33.3     10-05    132<L>  |  98  |  14  ----------------------------<  158<H>  3.6   |  23  |  0.76    Ca    8.6      05 Oct 2023 22:16  Phos  3.0     10-05  Mg     1.8     10-05    TPro  6.5  /  Alb  3.2<L>  /  TBili  1.0  /  DBili  x   /  AST  15  /  ALT  8<L>  /  AlkPhos  71  10-05    PT/INR - ( 04 Oct 2023 16:50 )   PT: 12.8 sec;   INR: 1.17          PTT - ( 04 Oct 2023 16:50 )  PTT:39.4 sec  Urinalysis Basic - ( 05 Oct 2023 22:16 )    Color: x / Appearance: x / SG: x / pH: x  Gluc: 158 mg/dL / Ketone: x  / Bili: x / Urobili: x   Blood: x / Protein: x / Nitrite: x   Leuk Esterase: x / RBC: x / WBC x   Sq Epi: x / Non Sq Epi: x / Bacteria: x      SARS-CoV-2: NotDetec (05 Oct 2023 08:56)      Radiology: Reviewed Patient is a 89y old  Female who presents with a chief complaint of Weakness (05 Oct 2023 12:41)    OVERNIGHT EVENTS/INTERVAL HPI: Overnight patient had Tmax of 102.8. Tylenol was given. Cultures drawn. MRSA and strep negative. Discontinued CTX and broadened to Zosyn. Continued w/ Azithromycin. This morning patient was seen and examined at bedside.     REVIEW OF SYSTEMS:  All other review of systems is negative unless indicated above.    OBJECTIVE:  T(C): 38.1 (10-06-23 @ 05:50), Max: 39.3 (10-05-23 @ 21:00)  HR: 84 (10-06-23 @ 05:50) (84 - 101)  BP: 130/59 (10-06-23 @ 05:50) (109/56 - 145/73)  RR: 18 (10-06-23 @ 05:50) (18 - 19)  SpO2: 92% (10-06-23 @ 05:50) (92% - 95%)  Daily Height in cm: 155 (05 Oct 2023 13:49)    Daily Weight in k.2 (05 Oct 2023 13:49)    Physical Exam:  General: in no acute distress  Eyes: EOMI intact bilaterally. Anicteric sclerae, moist conjunctivae  HENT: Moist mucous membranes  Neck: Trachea midline, supple  Lungs: CTA B/L. No wheezes, rales, or rhonchi  Cardiovascular: RRR. No murmurs, rubs, or gallops  Abdomen: Soft, non-tender non-distended; No rebound or guarding  Extremities: WWP, No clubbing, cyanosis or edema  MSK: No midline bony tenderness. No CVA tenderness bilaterally  Neurological: Alert and oriented x3  Skin: Warm and dry. No obvious rash     Medications:  MEDICATIONS  (STANDING):  azithromycin   Tablet 500 milliGRAM(s) Oral every 24 hours  dextrose 5%. 1000 milliLiter(s) (100 mL/Hr) IV Continuous <Continuous>  dextrose 5%. 1000 milliLiter(s) (50 mL/Hr) IV Continuous <Continuous>  dextrose 50% Injectable 25 Gram(s) IV Push once  dextrose 50% Injectable 25 Gram(s) IV Push once  dextrose 50% Injectable 12.5 Gram(s) IV Push once  enoxaparin Injectable 40 milliGRAM(s) SubCutaneous every 24 hours  glucagon  Injectable 1 milliGRAM(s) IntraMuscular once  influenza  Vaccine (HIGH DOSE) 0.7 milliLiter(s) IntraMuscular once  insulin lispro (ADMELOG) corrective regimen sliding scale   SubCutaneous three times a day before meals  insulin lispro (ADMELOG) corrective regimen sliding scale   SubCutaneous at bedtime  levothyroxine 75 MICROGram(s) Oral every 24 hours  piperacillin/tazobactam IVPB.- 4.5 Gram(s) IV Intermittent once  piperacillin/tazobactam IVPB.. 4.5 Gram(s) IV Intermittent every 8 hours    MEDICATIONS  (PRN):  acetaminophen     Tablet .. 650 milliGRAM(s) Oral every 6 hours PRN Temp greater or equal to 38C (100.4F), Mild Pain (1 - 3)  dextrose Oral Gel 15 Gram(s) Oral once PRN Blood Glucose LESS THAN 70 milliGRAM(s)/deciliter      Labs:                        11.1   11.21 )-----------( 176      ( 05 Oct 2023 22:16 )             33.3     10-05    132<L>  |  98  |  14  ----------------------------<  158<H>  3.6   |  23  |  0.76    Ca    8.6      05 Oct 2023 22:16  Phos  3.0     10-05  Mg     1.8     10-05    TPro  6.5  /  Alb  3.2<L>  /  TBili  1.0  /  DBili  x   /  AST  15  /  ALT  8<L>  /  AlkPhos  71  10-05    PT/INR - ( 04 Oct 2023 16:50 )   PT: 12.8 sec;   INR: 1.17          PTT - ( 04 Oct 2023 16:50 )  PTT:39.4 sec  Urinalysis Basic - ( 05 Oct 2023 22:16 )    Color: x / Appearance: x / SG: x / pH: x  Gluc: 158 mg/dL / Ketone: x  / Bili: x / Urobili: x   Blood: x / Protein: x / Nitrite: x   Leuk Esterase: x / RBC: x / WBC x   Sq Epi: x / Non Sq Epi: x / Bacteria: x      SARS-CoV-2: NotDetec (05 Oct 2023 08:56)      Radiology: Reviewed Patient is a 89y old  Female who presents with a chief complaint of Weakness (05 Oct 2023 12:41)    OVERNIGHT EVENTS/INTERVAL HPI: Overnight patient had Tmax of 102.8. Tylenol was given. Cultures drawn. MRSA and strep negative. Discontinued CTX and broadened to Zosyn. Continued w/ Azithromycin. This morning patient was seen and examined at bedside. Denies symptoms of HA, fevers/chills, nausea/vomiting, chest pain, SOB, abdominal pain, or dysuria.     REVIEW OF SYSTEMS:  All other review of systems is negative unless indicated above.    OBJECTIVE:  T(C): 38.1 (10-06-23 @ 05:50), Max: 39.3 (10-05-23 @ 21:00)  HR: 84 (10-06-23 @ 05:50) (84 - 101)  BP: 130/59 (10-06-23 @ 05:50) (109/56 - 145/73)  RR: 18 (10-06-23 @ 05:50) (18 - 19)  SpO2: 92% (10-06-23 @ 05:50) (92% - 95%)  Daily Height in cm: 155 (05 Oct 2023 13:49)    Daily Weight in k.2 (05 Oct 2023 13:49)    Physical Exam:  General: in no acute distress  Eyes: EOMI intact bilaterally. Anicteric sclerae, moist conjunctivae  HENT: Moist mucous membranes  Neck: Trachea midline, supple  Lungs: CTA B/L. No wheezes, rales, or rhonchi  Cardiovascular: RRR. No murmurs, rubs, or gallops  Abdomen: Soft, non-tender non-distended; No rebound or guarding  Extremities: WWP, No clubbing, cyanosis or edema  MSK: No midline bony tenderness. No CVA tenderness bilaterally  Neurological: Alert and oriented x3  Skin: Warm and dry. No obvious rash     Medications:  MEDICATIONS  (STANDING):  azithromycin   Tablet 500 milliGRAM(s) Oral every 24 hours  dextrose 5%. 1000 milliLiter(s) (100 mL/Hr) IV Continuous <Continuous>  dextrose 5%. 1000 milliLiter(s) (50 mL/Hr) IV Continuous <Continuous>  dextrose 50% Injectable 25 Gram(s) IV Push once  dextrose 50% Injectable 25 Gram(s) IV Push once  dextrose 50% Injectable 12.5 Gram(s) IV Push once  enoxaparin Injectable 40 milliGRAM(s) SubCutaneous every 24 hours  glucagon  Injectable 1 milliGRAM(s) IntraMuscular once  influenza  Vaccine (HIGH DOSE) 0.7 milliLiter(s) IntraMuscular once  insulin lispro (ADMELOG) corrective regimen sliding scale   SubCutaneous three times a day before meals  insulin lispro (ADMELOG) corrective regimen sliding scale   SubCutaneous at bedtime  levothyroxine 75 MICROGram(s) Oral every 24 hours  piperacillin/tazobactam IVPB.- 4.5 Gram(s) IV Intermittent once  piperacillin/tazobactam IVPB.. 4.5 Gram(s) IV Intermittent every 8 hours    MEDICATIONS  (PRN):  acetaminophen     Tablet .. 650 milliGRAM(s) Oral every 6 hours PRN Temp greater or equal to 38C (100.4F), Mild Pain (1 - 3)  dextrose Oral Gel 15 Gram(s) Oral once PRN Blood Glucose LESS THAN 70 milliGRAM(s)/deciliter      Labs:                        11.1   11.21 )-----------( 176      ( 05 Oct 2023 22:16 )             33.3     10-05    132<L>  |  98  |  14  ----------------------------<  158<H>  3.6   |  23  |  0.76    Ca    8.6      05 Oct 2023 22:16  Phos  3.0     10-05  Mg     1.8     10-05    TPro  6.5  /  Alb  3.2<L>  /  TBili  1.0  /  DBili  x   /  AST  15  /  ALT  8<L>  /  AlkPhos  71  10-05    PT/INR - ( 04 Oct 2023 16:50 )   PT: 12.8 sec;   INR: 1.17          PTT - ( 04 Oct 2023 16:50 )  PTT:39.4 sec  Urinalysis Basic - ( 05 Oct 2023 22:16 )    Color: x / Appearance: x / SG: x / pH: x  Gluc: 158 mg/dL / Ketone: x  / Bili: x / Urobili: x   Blood: x / Protein: x / Nitrite: x   Leuk Esterase: x / RBC: x / WBC x   Sq Epi: x / Non Sq Epi: x / Bacteria: x      SARS-CoV-2: NotDetec (05 Oct 2023 08:56)      Radiology: Reviewed Patient is a 89y old  Female who presents with a chief complaint of Weakness (05 Oct 2023 12:41)    OVERNIGHT EVENTS/INTERVAL HPI: Overnight patient had Tmax of 102.8. Tylenol was given. Cultures drawn. MRSA and strep negative. Discontinued CTX and broadened to Zosyn. Continued w/ Azithromycin. This morning patient was seen and examined at bedside. Denies symptoms of HA, fevers/chills, nausea/vomiting, chest pain, SOB, abdominal pain, or dysuria.     REVIEW OF SYSTEMS:  All other review of systems is negative unless indicated above.    OBJECTIVE:  T(C): 38.1 (10-06-23 @ 05:50), Max: 39.3 (10-05-23 @ 21:00)  HR: 84 (10-06-23 @ 05:50) (84 - 101)  BP: 130/59 (10-06-23 @ 05:50) (109/56 - 145/73)  RR: 18 (10-06-23 @ 05:50) (18 - 19)  SpO2: 92% (10-06-23 @ 05:50) (92% - 95%)  Daily Height in cm: 155 (05 Oct 2023 13:49)    Daily Weight in k.2 (05 Oct 2023 13:49)    Physical Exam:  General: in no acute distress  Lungs: CTA B/L. No wheezes, rales, or rhonchi  Cardiovascular: RRR. No murmurs, rubs, or gallops  Abdomen: Soft, non-tender non-distended; No rebound or guarding  Extremities: WWP, No clubbing, cyanosis or edema  MSK: No midline bony tenderness. No CVA tenderness bilaterally  Neurological: Alert and oriented x3  Skin: Warm and dry. No obvious rash     Medications:  MEDICATIONS  (STANDING):  azithromycin   Tablet 500 milliGRAM(s) Oral every 24 hours  dextrose 5%. 1000 milliLiter(s) (100 mL/Hr) IV Continuous <Continuous>  dextrose 5%. 1000 milliLiter(s) (50 mL/Hr) IV Continuous <Continuous>  dextrose 50% Injectable 25 Gram(s) IV Push once  dextrose 50% Injectable 25 Gram(s) IV Push once  dextrose 50% Injectable 12.5 Gram(s) IV Push once  enoxaparin Injectable 40 milliGRAM(s) SubCutaneous every 24 hours  glucagon  Injectable 1 milliGRAM(s) IntraMuscular once  influenza  Vaccine (HIGH DOSE) 0.7 milliLiter(s) IntraMuscular once  insulin lispro (ADMELOG) corrective regimen sliding scale   SubCutaneous three times a day before meals  insulin lispro (ADMELOG) corrective regimen sliding scale   SubCutaneous at bedtime  levothyroxine 75 MICROGram(s) Oral every 24 hours  piperacillin/tazobactam IVPB.- 4.5 Gram(s) IV Intermittent once  piperacillin/tazobactam IVPB.. 4.5 Gram(s) IV Intermittent every 8 hours    MEDICATIONS  (PRN):  acetaminophen     Tablet .. 650 milliGRAM(s) Oral every 6 hours PRN Temp greater or equal to 38C (100.4F), Mild Pain (1 - 3)  dextrose Oral Gel 15 Gram(s) Oral once PRN Blood Glucose LESS THAN 70 milliGRAM(s)/deciliter      Labs:                        11.1   11.21 )-----------( 176      ( 05 Oct 2023 22:16 )             33.3     10-05    132<L>  |  98  |  14  ----------------------------<  158<H>  3.6   |  23  |  0.76    Ca    8.6      05 Oct 2023 22:16  Phos  3.0     10-05  Mg     1.8     10-05    TPro  6.5  /  Alb  3.2<L>  /  TBili  1.0  /  DBili  x   /  AST  15  /  ALT  8<L>  /  AlkPhos  71  10-05    PT/INR - ( 04 Oct 2023 16:50 )   PT: 12.8 sec;   INR: 1.17          PTT - ( 04 Oct 2023 16:50 )  PTT:39.4 sec  Urinalysis Basic - ( 05 Oct 2023 22:16 )    Color: x / Appearance: x / SG: x / pH: x  Gluc: 158 mg/dL / Ketone: x  / Bili: x / Urobili: x   Blood: x / Protein: x / Nitrite: x   Leuk Esterase: x / RBC: x / WBC x   Sq Epi: x / Non Sq Epi: x / Bacteria: x      SARS-CoV-2: NotDetec (05 Oct 2023 08:56)      Radiology: Reviewed

## 2023-10-06 NOTE — PHYSICAL THERAPY INITIAL EVALUATION ADULT - ADDITIONAL COMMENTS
Pt lives with Daughter in apartment. Pt performs ADLs and IADLs with help from HHA services,  HHA 9A-3P (Monday) + 10A-3P (Tuesday-Sunday). Pt ambulates with SC and RW at baseline

## 2023-10-06 NOTE — SWALLOW BEDSIDE ASSESSMENT ADULT - SWALLOW EVAL: DIAGNOSIS
Fnxl oropharyngeal swallow with no overt signs of aspiration or other swallowing impairment at this time. Modified texture solids recommended d/t missing upper dentition & not a mechanical dysphagia

## 2023-10-06 NOTE — SWALLOW BEDSIDE ASSESSMENT ADULT - PHARYNGEAL PHASE
Hyolaryngeal excursion palpated during swallow trigger & appears brisk & timely. No change in voice, throat clear or cough after PO trials.

## 2023-10-06 NOTE — PHYSICAL THERAPY INITIAL EVALUATION ADULT - PERTINENT HX OF CURRENT PROBLEM, REHAB EVAL
89F Beninese-speaking with PMHx hypothyroidism, HLD, DM presents to [facility] ED with fever, HA and generalized weakness x 1d. Pt states she started to feel weak with a fever yesterday, when her daughter brought her to the ED. She states she has had a chronic dry cough and rhinorrhea since she was admitted to [facility] with COVID PNA in 1/2022. Otherwise, denies worsening cough, chills, shortness of breath, dysuria, abdominal pain.

## 2023-10-06 NOTE — DISCHARGE NOTE PROVIDER - HOSPITAL COURSE
#Discharge: do not delete    89F Swedish-speaking with PMHx hypothyroidism, HLD, DM presents to [facility] ED with fever, HA and generalized weakness x 1d, admitted for sepsis 2/2 likely PNA.     Hospital course (by problem):     #Sepsis.   ·  Plan: Presented with 2/4 SIRS: T100.8, HR 93. WBC 11. COVID/flu/RSV negative. UA negative.   CXR on admission: Right perihilar opacities  DDx: likely bacterial PNA, CAP vs viral PNA - favor CAP given unilateral. No recent hospitalizations or c/f HAP.   Overnight febrile tmax 102.8, broadened to Zosyn.  Plan:  - BCx NGTD  - Ustrep negative, ULegionella pending  - RVP negative  - D/c zosyn and continue w/ 1g CTX for 5 days  - c/w Azithromycin 500mg qd (de-escalate pending ULegionella).    #Gram-negative pneumonia.   ·  Plan: Pt w/ no respiratory symptoms. Repeat CXR 10/6 significant for increased opacifications in the R lower/middle lobe as compared to admission CXR.   - Obtain PA lateral and oblique CXR  - Continue treatment as stated above  - Given location of consolidation, obtain S&S  - OOBTC.    #Hyperbilirubinemia.   ·  Plan: Bilirubin on admission 2.2. LFTS wnl. Denying RUQ pain, no juandice, and anicteric sclera. Most likely infectious marker.   10/6 Bilirubin 0.9  - CTM.    #Hypothyroidism.   ·  Plan: Hx of hypothyroidism. On levothyroxine 75mcg qd.   Plan:  - C/w levothyroxine 75mcg qd  (no need to obtain TSH now as pt has acute infection and will likely not be accurate).    #Type 2 diabetes mellitus.   ·  Plan: On repaglinide 2mg TID.   Plan:  - mISS  - f/u A1c.    #Hyperlipidemia.   ·  Plan: Hx of HLD. No longer on medications.     LENNOX.  Patient was discharged to:     New medications: CTX  Changes to old medications: NA  Medications that were stopped: NA    Items to follow up as outpatient:    Physical exam at the time of discharge:    General: in no acute distress  Lungs: CTA B/L. No wheezes, rales, or rhonchi  Cardiovascular: RRR. No murmurs, rubs, or gallops  Abdomen: Soft, non-tender non-distended; No rebound or guarding  Extremities: WWP, No clubbing, cyanosis or edema  MSK: No midline bony tenderness. No CVA tenderness bilaterally  Neurological: Alert and oriented x3  Skin: Warm and dry. No obvious rash    #Discharge: do not delete    89F Armenian-speaking with PMHx hypothyroidism, HLD, DM presents to [facility] ED with fever, HA and generalized weakness x 1d, admitted for sepsis 2/2 likely PNA.     Hospital course (by problem):     #Sepsis.   ·  Plan: Presented with 2/4 SIRS: T100.8, HR 93. WBC 11. COVID/flu/RSV negative. UA negative.   CXR on admission: Right perihilar opacities  DDx: CAP PNA   Plan:  - BCx NGTD  - Ustrep negative, ULegionella negative  - RVP negative  - 1g CTX for 5 days    #Gram-negative pneumonia.   ·  Plan: Pt w/ no respiratory symptoms. Repeat CXR 10/6 significant for increased opacifications in the R lower/middle lobe as compared to admission CXR.   - PA lateral and oblique CXR consistent with right lung PNA  - Continue treatment as stated above  - Passed speech and swallow  - Encourage out of bed to chair    #Hyperbilirubinemia.   ·  Plan: Bilirubin on admission 2.2. LFTS wnl. Denying RUQ pain, no juandice, and anicteric sclera. Most likely infectious marker.   10/6 Bilirubin 0.9  - CTM.    #Hypothyroidism.   ·  Plan: Hx of hypothyroidism. On levothyroxine 75mcg qd.   Plan:  - C/w levothyroxine 75mcg qd  (no need to obtain TSH now as pt has acute infection and will likely not be accurate).    #Type 2 diabetes mellitus.   ·  Plan: On repaglinide 2mg TID.   Plan:  - mISS  - f/u A1c.    #Hyperlipidemia.   ·  Plan: Hx of HLD. No longer on medications.     LENNOX.  Patient was discharged to:     New medications: CTX  Changes to old medications: NA  Medications that were stopped: NA    Items to follow up as outpatient: Follow up with Dr. Christianson within 1 week of discharge    Physical exam at the time of discharge:    General: in no acute distress  Lungs: CTA B/L. No wheezes, rales, or rhonchi  Cardiovascular: RRR. No murmurs, rubs, or gallops  Abdomen: Soft, non-tender non-distended; No rebound or guarding  Extremities: WWP, No clubbing, cyanosis or edema  MSK: No midline bony tenderness. No CVA tenderness bilaterally  Neurological: Alert and oriented x3  Skin: Warm and dry. No obvious rash    #Discharge: do not delete    89F Irish-speaking with PMHx hypothyroidism, HLD, DM presents to [facility] ED with fever, HA and generalized weakness x 1d, admitted for sepsis 2/2 likely PNA.     Hospital course (by problem):     #Severe Sepsis.   ·  Plan: Presented with 2/4 SIRS: T100.8, HR 93. WBC 11. COVID/flu/RSV negative. UA negative.   CXR on admission: Right perihilar opacities  DDx: CAP PNA   Plan:  - BCx NGTD  - Ustrep negative, ULegionella negative  - RVP negative  - 1g CTX for 5 days    #Gram-negative pneumonia.   ·  Plan: Pt w/ no respiratory symptoms. Repeat CXR 10/6 significant for increased opacifications in the R lower/middle lobe as compared to admission CXR.   - PA lateral and oblique CXR consistent with right lung PNA  - Continue treatment as stated above  - Passed speech and swallow  - Encourage out of bed to chair    #Hyperbilirubinemia.   ·  Plan: Bilirubin on admission 2.2. LFTS wnl. Denying RUQ pain, no juandice, and anicteric sclera. Most likely infectious marker.   10/6 Bilirubin 0.9  - CTM.    #Hypothyroidism.   ·  Plan: Hx of hypothyroidism. On levothyroxine 75mcg qd.   Plan:  - C/w levothyroxine 75mcg qd  (no need to obtain TSH now as pt has acute infection and will likely not be accurate).    #Type 2 diabetes mellitus.   ·  Plan: On repaglinide 2mg TID.   Plan:  - mISS  - f/u A1c.    #Hyperlipidemia.   ·  Plan: Hx of HLD. No longer on medications.     LENNOX.  Patient was discharged to:     New medications: CTX  Changes to old medications: NA  Medications that were stopped: NA    Items to follow up as outpatient: Follow up with Dr. Christianson within 1 week of discharge    Physical exam at the time of discharge:    General: in no acute distress  Lungs: CTA B/L. No wheezes, rales, or rhonchi  Cardiovascular: RRR. No murmurs, rubs, or gallops  Abdomen: Soft, non-tender non-distended; No rebound or guarding  Extremities: WWP, No clubbing, cyanosis or edema  MSK: No midline bony tenderness. No CVA tenderness bilaterally  Neurological: Alert and oriented x3  Skin: Warm and dry. No obvious rash

## 2023-10-07 LAB
ALBUMIN SERPL ELPH-MCNC: 3.4 G/DL — SIGNIFICANT CHANGE UP (ref 3.3–5)
ALP SERPL-CCNC: 77 U/L — SIGNIFICANT CHANGE UP (ref 40–120)
ALT FLD-CCNC: 10 U/L — SIGNIFICANT CHANGE UP (ref 10–45)
ANION GAP SERPL CALC-SCNC: 13 MMOL/L — SIGNIFICANT CHANGE UP (ref 5–17)
AST SERPL-CCNC: 14 U/L — SIGNIFICANT CHANGE UP (ref 10–40)
BASOPHILS # BLD AUTO: 0.01 K/UL — SIGNIFICANT CHANGE UP (ref 0–0.2)
BASOPHILS NFR BLD AUTO: 0.1 % — SIGNIFICANT CHANGE UP (ref 0–2)
BILIRUB SERPL-MCNC: 0.5 MG/DL — SIGNIFICANT CHANGE UP (ref 0.2–1.2)
BUN SERPL-MCNC: 10 MG/DL — SIGNIFICANT CHANGE UP (ref 7–23)
CALCIUM SERPL-MCNC: 8.6 MG/DL — SIGNIFICANT CHANGE UP (ref 8.4–10.5)
CHLORIDE SERPL-SCNC: 103 MMOL/L — SIGNIFICANT CHANGE UP (ref 96–108)
CO2 SERPL-SCNC: 23 MMOL/L — SIGNIFICANT CHANGE UP (ref 22–31)
CREAT SERPL-MCNC: 0.77 MG/DL — SIGNIFICANT CHANGE UP (ref 0.5–1.3)
EGFR: 71 ML/MIN/1.73M2 — SIGNIFICANT CHANGE UP
EOSINOPHIL # BLD AUTO: 0 K/UL — SIGNIFICANT CHANGE UP (ref 0–0.5)
EOSINOPHIL NFR BLD AUTO: 0 % — SIGNIFICANT CHANGE UP (ref 0–6)
GLUCOSE BLDC GLUCOMTR-MCNC: 148 MG/DL — HIGH (ref 70–99)
GLUCOSE BLDC GLUCOMTR-MCNC: 149 MG/DL — HIGH (ref 70–99)
GLUCOSE BLDC GLUCOMTR-MCNC: 162 MG/DL — HIGH (ref 70–99)
GLUCOSE BLDC GLUCOMTR-MCNC: 211 MG/DL — HIGH (ref 70–99)
GLUCOSE SERPL-MCNC: 157 MG/DL — HIGH (ref 70–99)
HCT VFR BLD CALC: 36.4 % — SIGNIFICANT CHANGE UP (ref 34.5–45)
HGB BLD-MCNC: 12.1 G/DL — SIGNIFICANT CHANGE UP (ref 11.5–15.5)
IMM GRANULOCYTES NFR BLD AUTO: 0.4 % — SIGNIFICANT CHANGE UP (ref 0–0.9)
LYMPHOCYTES # BLD AUTO: 1.23 K/UL — SIGNIFICANT CHANGE UP (ref 1–3.3)
LYMPHOCYTES # BLD AUTO: 17.4 % — SIGNIFICANT CHANGE UP (ref 13–44)
MAGNESIUM SERPL-MCNC: 2.2 MG/DL — SIGNIFICANT CHANGE UP (ref 1.6–2.6)
MCHC RBC-ENTMCNC: 29.8 PG — SIGNIFICANT CHANGE UP (ref 27–34)
MCHC RBC-ENTMCNC: 33.2 GM/DL — SIGNIFICANT CHANGE UP (ref 32–36)
MCV RBC AUTO: 89.7 FL — SIGNIFICANT CHANGE UP (ref 80–100)
MONOCYTES # BLD AUTO: 0.87 K/UL — SIGNIFICANT CHANGE UP (ref 0–0.9)
MONOCYTES NFR BLD AUTO: 12.3 % — SIGNIFICANT CHANGE UP (ref 2–14)
NEUTROPHILS # BLD AUTO: 4.93 K/UL — SIGNIFICANT CHANGE UP (ref 1.8–7.4)
NEUTROPHILS NFR BLD AUTO: 69.8 % — SIGNIFICANT CHANGE UP (ref 43–77)
NRBC # BLD: 0 /100 WBCS — SIGNIFICANT CHANGE UP (ref 0–0)
PHOSPHATE SERPL-MCNC: 2.9 MG/DL — SIGNIFICANT CHANGE UP (ref 2.5–4.5)
PLATELET # BLD AUTO: 206 K/UL — SIGNIFICANT CHANGE UP (ref 150–400)
POTASSIUM SERPL-MCNC: 3.9 MMOL/L — SIGNIFICANT CHANGE UP (ref 3.5–5.3)
POTASSIUM SERPL-SCNC: 3.9 MMOL/L — SIGNIFICANT CHANGE UP (ref 3.5–5.3)
PROT SERPL-MCNC: 7.1 G/DL — SIGNIFICANT CHANGE UP (ref 6–8.3)
RBC # BLD: 4.06 M/UL — SIGNIFICANT CHANGE UP (ref 3.8–5.2)
RBC # FLD: 13.2 % — SIGNIFICANT CHANGE UP (ref 10.3–14.5)
SODIUM SERPL-SCNC: 139 MMOL/L — SIGNIFICANT CHANGE UP (ref 135–145)
WBC # BLD: 7.07 K/UL — SIGNIFICANT CHANGE UP (ref 3.8–10.5)
WBC # FLD AUTO: 7.07 K/UL — SIGNIFICANT CHANGE UP (ref 3.8–10.5)

## 2023-10-07 PROCEDURE — 99232 SBSQ HOSP IP/OBS MODERATE 35: CPT | Mod: GC

## 2023-10-07 RX ADMIN — ENOXAPARIN SODIUM 40 MILLIGRAM(S): 100 INJECTION SUBCUTANEOUS at 15:07

## 2023-10-07 RX ADMIN — CEFTRIAXONE 100 MILLIGRAM(S): 500 INJECTION, POWDER, FOR SOLUTION INTRAMUSCULAR; INTRAVENOUS at 13:19

## 2023-10-07 RX ADMIN — Medication 75 MICROGRAM(S): at 07:04

## 2023-10-07 RX ADMIN — Medication 2: at 13:22

## 2023-10-07 RX ADMIN — Medication 650 MILLIGRAM(S): at 00:44

## 2023-10-07 NOTE — PROGRESS NOTE ADULT - SUBJECTIVE AND OBJECTIVE BOX
Patient is a 89y old  Female who presents with a chief complaint of Weakness (05 Oct 2023 12:41)    OVERNIGHT EVENTS: MARJAN    INTERVAL HPI: Overnight febrile. This morning patient was seen and examined at bedside. Feels well. Denies symptoms of HA, fevers/chills, nausea/vomiting, chest pain, SOB, abdominal pain, or dysuria. ROS is otherwise negative.     REVIEW OF SYSTEMS:  All other review of systems is negative unless indicated above.      Vital Signs Last 24 Hrs  T(C): 36.9 (07 Oct 2023 05:53), Max: 38.4 (06 Oct 2023 22:03)  T(F): 98.5 (07 Oct 2023 05:53), Max: 101.2 (06 Oct 2023 22:03)  HR: 89 (07 Oct 2023 05:53) (78 - 97)  BP: 127/79 (07 Oct 2023 05:53) (107/66 - 127/79)  BP(mean): --  RR: 18 (07 Oct 2023 07:10) (17 - 18)  SpO2: 93% (07 Oct 2023 07:10) (91% - 93%)    Parameters below as of 07 Oct 2023 07:10  Patient On (Oxygen Delivery Method): room air            Physical Exam:  General: in no acute distress  Lungs: CTA B/L. No wheezes, rales, or rhonchi  Cardiovascular: RRR. No murmurs, rubs, or gallops  Abdomen: Soft, non-tender non-distended; No rebound or guarding  Extremities: WWP, No clubbing, cyanosis or edema  MSK: No midline bony tenderness. No CVA tenderness bilaterally  Neurological: Alert and oriented x3  Skin: Warm and dry. No obvious rash   Psych: normal affect      MEDICATIONS  (STANDING):  cefTRIAXone   IVPB 2000 milliGRAM(s) IV Intermittent every 24 hours  dextrose 5%. 1000 milliLiter(s) (50 mL/Hr) IV Continuous <Continuous>  dextrose 5%. 1000 milliLiter(s) (100 mL/Hr) IV Continuous <Continuous>  dextrose 50% Injectable 25 Gram(s) IV Push once  dextrose 50% Injectable 25 Gram(s) IV Push once  dextrose 50% Injectable 12.5 Gram(s) IV Push once  enoxaparin Injectable 40 milliGRAM(s) SubCutaneous every 24 hours  glucagon  Injectable 1 milliGRAM(s) IntraMuscular once  influenza  Vaccine (HIGH DOSE) 0.7 milliLiter(s) IntraMuscular once  insulin lispro (ADMELOG) corrective regimen sliding scale   SubCutaneous three times a day before meals  insulin lispro (ADMELOG) corrective regimen sliding scale   SubCutaneous at bedtime  levothyroxine 75 MICROGram(s) Oral every 24 hours    MEDICATIONS  (PRN):  acetaminophen     Tablet .. 650 milliGRAM(s) Oral every 6 hours PRN Temp greater or equal to 38C (100.4F), Mild Pain (1 - 3)  dextrose Oral Gel 15 Gram(s) Oral once PRN Blood Glucose LESS THAN 70 milliGRAM(s)/deciliter          Labs:                                   12.1   7.07  )-----------( 206      ( 07 Oct 2023 08:24 )             36.4   10-07    139  |  103  |  10  ----------------------------<  157<H>  3.9   |  23  |  0.77    Ca    8.6      07 Oct 2023 08:24  Phos  2.9     10-07  Mg     2.2     10-07    TPro  7.1  /  Alb  3.4  /  TBili  0.5  /  DBili  x   /  AST  14  /  ALT  10  /  AlkPhos  77  10-07          Color: x / Appearance: x / SG: x / pH: x  Gluc: 158 mg/dL / Ketone: x  / Bili: x / Urobili: x   Blood: x / Protein: x / Nitrite: x   Leuk Esterase: x / RBC: x / WBC x   Sq Epi: x / Non Sq Epi: x / Bacteria: x      SARS-CoV-2: NotDetec (05 Oct 2023 08:56)      Radiology: Reviewed

## 2023-10-07 NOTE — PROGRESS NOTE ADULT - PROBLEM SELECTOR PLAN 7
Plan:  F: tolerating PO no fluids needed   E: replete K<4, Mg<2  N: Consistent Carb  VTE Prophylaxis: Lovenox   GI: None  C: Full Code  D: RMF

## 2023-10-07 NOTE — PROGRESS NOTE ADULT - PROBLEM SELECTOR PLAN 1
POA, Presented with 2/4 SIRS: T100.8, HR 93. WBC 11. w/ bilirubin > 2, COVID/flu/RSV negative. UA negative.   CXR: Right perihilar opacities  DDx: likely bacterial PNA,    Plan:  - BCx NGTD  - Ustrep negative, ULegionella pending  - RVP negative  - C/w CTX  - d/c Azithromycin 500mg qd (de-escalate pending ULegionella)

## 2023-10-08 LAB
ALBUMIN SERPL ELPH-MCNC: 3.5 G/DL — SIGNIFICANT CHANGE UP (ref 3.3–5)
ALP SERPL-CCNC: 73 U/L — SIGNIFICANT CHANGE UP (ref 40–120)
ALT FLD-CCNC: 10 U/L — SIGNIFICANT CHANGE UP (ref 10–45)
ANION GAP SERPL CALC-SCNC: 10 MMOL/L — SIGNIFICANT CHANGE UP (ref 5–17)
AST SERPL-CCNC: 14 U/L — SIGNIFICANT CHANGE UP (ref 10–40)
BASOPHILS # BLD AUTO: 0.01 K/UL — SIGNIFICANT CHANGE UP (ref 0–0.2)
BASOPHILS NFR BLD AUTO: 0.2 % — SIGNIFICANT CHANGE UP (ref 0–2)
BILIRUB SERPL-MCNC: 0.3 MG/DL — SIGNIFICANT CHANGE UP (ref 0.2–1.2)
BUN SERPL-MCNC: 12 MG/DL — SIGNIFICANT CHANGE UP (ref 7–23)
CALCIUM SERPL-MCNC: 9.1 MG/DL — SIGNIFICANT CHANGE UP (ref 8.4–10.5)
CHLORIDE SERPL-SCNC: 103 MMOL/L — SIGNIFICANT CHANGE UP (ref 96–108)
CO2 SERPL-SCNC: 26 MMOL/L — SIGNIFICANT CHANGE UP (ref 22–31)
CREAT SERPL-MCNC: 0.68 MG/DL — SIGNIFICANT CHANGE UP (ref 0.5–1.3)
EGFR: 80 ML/MIN/1.73M2 — SIGNIFICANT CHANGE UP
EOSINOPHIL # BLD AUTO: 0 K/UL — SIGNIFICANT CHANGE UP (ref 0–0.5)
EOSINOPHIL NFR BLD AUTO: 0 % — SIGNIFICANT CHANGE UP (ref 0–6)
GLUCOSE BLDC GLUCOMTR-MCNC: 135 MG/DL — HIGH (ref 70–99)
GLUCOSE BLDC GLUCOMTR-MCNC: 148 MG/DL — HIGH (ref 70–99)
GLUCOSE BLDC GLUCOMTR-MCNC: 149 MG/DL — HIGH (ref 70–99)
GLUCOSE BLDC GLUCOMTR-MCNC: 209 MG/DL — HIGH (ref 70–99)
GLUCOSE SERPL-MCNC: 180 MG/DL — HIGH (ref 70–99)
HCT VFR BLD CALC: 36.9 % — SIGNIFICANT CHANGE UP (ref 34.5–45)
HGB BLD-MCNC: 11.8 G/DL — SIGNIFICANT CHANGE UP (ref 11.5–15.5)
IMM GRANULOCYTES NFR BLD AUTO: 0.3 % — SIGNIFICANT CHANGE UP (ref 0–0.9)
LYMPHOCYTES # BLD AUTO: 1.2 K/UL — SIGNIFICANT CHANGE UP (ref 1–3.3)
LYMPHOCYTES # BLD AUTO: 20.2 % — SIGNIFICANT CHANGE UP (ref 13–44)
MAGNESIUM SERPL-MCNC: 2.2 MG/DL — SIGNIFICANT CHANGE UP (ref 1.6–2.6)
MCHC RBC-ENTMCNC: 29.1 PG — SIGNIFICANT CHANGE UP (ref 27–34)
MCHC RBC-ENTMCNC: 32 GM/DL — SIGNIFICANT CHANGE UP (ref 32–36)
MCV RBC AUTO: 90.9 FL — SIGNIFICANT CHANGE UP (ref 80–100)
MONOCYTES # BLD AUTO: 0.58 K/UL — SIGNIFICANT CHANGE UP (ref 0–0.9)
MONOCYTES NFR BLD AUTO: 9.7 % — SIGNIFICANT CHANGE UP (ref 2–14)
NEUTROPHILS # BLD AUTO: 4.14 K/UL — SIGNIFICANT CHANGE UP (ref 1.8–7.4)
NEUTROPHILS NFR BLD AUTO: 69.6 % — SIGNIFICANT CHANGE UP (ref 43–77)
NRBC # BLD: 0 /100 WBCS — SIGNIFICANT CHANGE UP (ref 0–0)
PHOSPHATE SERPL-MCNC: 2.9 MG/DL — SIGNIFICANT CHANGE UP (ref 2.5–4.5)
PLATELET # BLD AUTO: 230 K/UL — SIGNIFICANT CHANGE UP (ref 150–400)
POTASSIUM SERPL-MCNC: 4.2 MMOL/L — SIGNIFICANT CHANGE UP (ref 3.5–5.3)
POTASSIUM SERPL-SCNC: 4.2 MMOL/L — SIGNIFICANT CHANGE UP (ref 3.5–5.3)
PROT SERPL-MCNC: 6.8 G/DL — SIGNIFICANT CHANGE UP (ref 6–8.3)
RBC # BLD: 4.06 M/UL — SIGNIFICANT CHANGE UP (ref 3.8–5.2)
RBC # FLD: 13.3 % — SIGNIFICANT CHANGE UP (ref 10.3–14.5)
SODIUM SERPL-SCNC: 139 MMOL/L — SIGNIFICANT CHANGE UP (ref 135–145)
WBC # BLD: 5.95 K/UL — SIGNIFICANT CHANGE UP (ref 3.8–10.5)
WBC # FLD AUTO: 5.95 K/UL — SIGNIFICANT CHANGE UP (ref 3.8–10.5)

## 2023-10-08 PROCEDURE — 99232 SBSQ HOSP IP/OBS MODERATE 35: CPT | Mod: GC

## 2023-10-08 RX ADMIN — Medication 4: at 18:17

## 2023-10-08 RX ADMIN — Medication 75 MICROGRAM(S): at 07:16

## 2023-10-08 RX ADMIN — CEFTRIAXONE 100 MILLIGRAM(S): 500 INJECTION, POWDER, FOR SOLUTION INTRAMUSCULAR; INTRAVENOUS at 13:04

## 2023-10-08 RX ADMIN — ENOXAPARIN SODIUM 40 MILLIGRAM(S): 100 INJECTION SUBCUTANEOUS at 15:11

## 2023-10-08 NOTE — PROGRESS NOTE ADULT - PROBLEM SELECTOR PLAN 1
POA, Presented with 2/4 SIRS: T100.8, HR 93. WBC 11. w/ bilirubin > 2, COVID/flu/RSV negative. UA negative.   CXR: Right perihilar opacities  DDx: likely bacterial PNA,    Plan:  - BCx NGTD  - Ustrep negative, ULegionella negative  - RVP negative  - C/w CTX  - d/c Azithromycin 500mg qd

## 2023-10-08 NOTE — PROGRESS NOTE ADULT - PROBLEM SELECTOR PLAN 6
Hx of HLD. No longer on medications.     LENNOX

## 2023-10-08 NOTE — PROGRESS NOTE ADULT - SUBJECTIVE AND OBJECTIVE BOX
Internal Medicine Progress Note  Cira Kaur, PGY-1  Pager: [phone]    ******INCOMPLETE******    Patient is a 89y old  Female who presents with a chief complaint of Weakness (07 Oct 2023 12:15)    OVERNIGHT EVENTS/INTERVAL HPI:    REVIEW OF SYSTEMS:  All other review of systems is negative unless indicated above.    OBJECTIVE:  T(C): 36.7 (10-08-23 @ 05:53), Max: 37.1 (10-07-23 @ 14:54)  HR: 89 (10-08-23 @ 05:53) (74 - 90)  BP: 120/71 (10-08-23 @ 05:53) (108/61 - 127/72)  RR: 16 (10-08-23 @ 05:53) (16 - 18)  SpO2: 93% (10-08-23 @ 05:53) (93% - 94%)  Daily     Daily     Physical Exam:  General: in no acute distress  Eyes: EOMI intact bilaterally. Anicteric sclerae, moist conjunctivae  HENT: Moist mucous membranes  Neck: Trachea midline, supple  Lungs: CTA B/L. No wheezes, rales, or rhonchi  Cardiovascular: RRR. No murmurs, rubs, or gallops  Abdomen: Soft, non-tender non-distended; No rebound or guarding  Extremities: WWP, No clubbing, cyanosis or edema  MSK: No midline bony tenderness. No CVA tenderness bilaterally  Neurological: Alert and oriented x3  Skin: Warm and dry. No obvious rash     Medications:  MEDICATIONS  (STANDING):  cefTRIAXone   IVPB 2000 milliGRAM(s) IV Intermittent every 24 hours  dextrose 5%. 1000 milliLiter(s) (50 mL/Hr) IV Continuous <Continuous>  dextrose 5%. 1000 milliLiter(s) (100 mL/Hr) IV Continuous <Continuous>  dextrose 50% Injectable 25 Gram(s) IV Push once  dextrose 50% Injectable 12.5 Gram(s) IV Push once  dextrose 50% Injectable 25 Gram(s) IV Push once  enoxaparin Injectable 40 milliGRAM(s) SubCutaneous every 24 hours  glucagon  Injectable 1 milliGRAM(s) IntraMuscular once  influenza  Vaccine (HIGH DOSE) 0.7 milliLiter(s) IntraMuscular once  insulin lispro (ADMELOG) corrective regimen sliding scale   SubCutaneous at bedtime  insulin lispro (ADMELOG) corrective regimen sliding scale   SubCutaneous three times a day before meals  levothyroxine 75 MICROGram(s) Oral every 24 hours    MEDICATIONS  (PRN):  acetaminophen     Tablet .. 650 milliGRAM(s) Oral every 6 hours PRN Temp greater or equal to 38C (100.4F), Mild Pain (1 - 3)  dextrose Oral Gel 15 Gram(s) Oral once PRN Blood Glucose LESS THAN 70 milliGRAM(s)/deciliter      Labs:                        12.1   7.07  )-----------( 206      ( 07 Oct 2023 08:24 )             36.4     10-07    139  |  103  |  10  ----------------------------<  157<H>  3.9   |  23  |  0.77    Ca    8.6      07 Oct 2023 08:24  Phos  2.9     10-07  Mg     2.2     10-07    TPro  7.1  /  Alb  3.4  /  TBili  0.5  /  DBili  x   /  AST  14  /  ALT  10  /  AlkPhos  77  10-07      Urinalysis Basic - ( 07 Oct 2023 08:24 )    Color: x / Appearance: x / SG: x / pH: x  Gluc: 157 mg/dL / Ketone: x  / Bili: x / Urobili: x   Blood: x / Protein: x / Nitrite: x   Leuk Esterase: x / RBC: x / WBC x   Sq Epi: x / Non Sq Epi: x / Bacteria: x      SARS-CoV-2: NotDetec (05 Oct 2023 08:56)      Radiology: Reviewed Patient is a 89y old  Female who presents with a chief complaint of Weakness (07 Oct 2023 12:15)    OVERNIGHT EVENTS/INTERVAL HPI: No acute events overnight. Pt was seen and examined at bedside this AM. Denies HA, fevers, chills, n/v, SOB or URI symptoms. States she is feeling much better.     REVIEW OF SYSTEMS:  All other review of systems is negative unless indicated above.    OBJECTIVE:  T(C): 36.7 (10-08-23 @ 05:53), Max: 37.1 (10-07-23 @ 14:54)  HR: 89 (10-08-23 @ 05:53) (74 - 90)  BP: 120/71 (10-08-23 @ 05:53) (108/61 - 127/72)  RR: 16 (10-08-23 @ 05:53) (16 - 18)  SpO2: 93% (10-08-23 @ 05:53) (93% - 94%)  Daily     Daily     Physical Exam:  General: in no acute distress  Lungs: CTA B/L. No wheezes, rales, or rhonchi  Cardiovascular: RRR. No murmurs, rubs, or gallops  Abdomen: Soft, non-tender non-distended; No rebound or guarding  Extremities: WWP, No clubbing, cyanosis or edema  MSK: No midline bony tenderness. No CVA tenderness bilaterally  Neurological: Alert and oriented x3  Skin: Warm and dry. No obvious rash     Medications:  MEDICATIONS  (STANDING):  cefTRIAXone   IVPB 2000 milliGRAM(s) IV Intermittent every 24 hours  dextrose 5%. 1000 milliLiter(s) (50 mL/Hr) IV Continuous <Continuous>  dextrose 5%. 1000 milliLiter(s) (100 mL/Hr) IV Continuous <Continuous>  dextrose 50% Injectable 25 Gram(s) IV Push once  dextrose 50% Injectable 12.5 Gram(s) IV Push once  dextrose 50% Injectable 25 Gram(s) IV Push once  enoxaparin Injectable 40 milliGRAM(s) SubCutaneous every 24 hours  glucagon  Injectable 1 milliGRAM(s) IntraMuscular once  influenza  Vaccine (HIGH DOSE) 0.7 milliLiter(s) IntraMuscular once  insulin lispro (ADMELOG) corrective regimen sliding scale   SubCutaneous at bedtime  insulin lispro (ADMELOG) corrective regimen sliding scale   SubCutaneous three times a day before meals  levothyroxine 75 MICROGram(s) Oral every 24 hours    MEDICATIONS  (PRN):  acetaminophen     Tablet .. 650 milliGRAM(s) Oral every 6 hours PRN Temp greater or equal to 38C (100.4F), Mild Pain (1 - 3)  dextrose Oral Gel 15 Gram(s) Oral once PRN Blood Glucose LESS THAN 70 milliGRAM(s)/deciliter      Labs:                        12.1   7.07  )-----------( 206      ( 07 Oct 2023 08:24 )             36.4     10-07    139  |  103  |  10  ----------------------------<  157<H>  3.9   |  23  |  0.77    Ca    8.6      07 Oct 2023 08:24  Phos  2.9     10-07  Mg     2.2     10-07    TPro  7.1  /  Alb  3.4  /  TBili  0.5  /  DBili  x   /  AST  14  /  ALT  10  /  AlkPhos  77  10-07      Urinalysis Basic - ( 07 Oct 2023 08:24 )    Color: x / Appearance: x / SG: x / pH: x  Gluc: 157 mg/dL / Ketone: x  / Bili: x / Urobili: x   Blood: x / Protein: x / Nitrite: x   Leuk Esterase: x / RBC: x / WBC x   Sq Epi: x / Non Sq Epi: x / Bacteria: x      SARS-CoV-2: NotDetec (05 Oct 2023 08:56)      Radiology: Reviewed

## 2023-10-08 NOTE — PROGRESS NOTE ADULT - ATTENDING COMMENTS
Patient was seen and examined at bedside on 10/8/2023 at 10 am. Patient reports that she feels improved. Denies SOB, CP. ROS is otherwise negative. Vitals, labwork and pertinent imaging reviewed. Physical exam - NAD, AAO x 4, PERRLA, EOMI, supple neck, chest - decreased BS b/l, CV - rrr, s1s2, no m/r/g, abd - soft, + BS, NTND, ext - WWP, MSK - no joint swelling,skin -  no rash, back - midline, psych - normal affect    Plan  -C/w Abx  -F/u cultures  -PT consult - rec [facility]  -Patient is medically ready for d/c
Patient was seen and examined at bedside on 10/6/2023 at 12 pm. Patient reports that she feels improved. Denies SOB, CP. ROS is otherwise negative. Vitals, labwork and pertinent imaging reviewed. Physical exam - NAD, AAO x 4, PERRLA, EOMI, supple neck, chest - decreased BS b/l, CV - rrr, s1s2, no m/r/g, abd - soft, + BS, NTND, ext - WWP, MSK - no joint swelling,skin -  no rash, back - midline, psych - normal affect    Plan  -C/w Abx  -F/u cultures  -PT consult   -Anticipate d/c in AM

## 2023-10-09 ENCOUNTER — TRANSCRIPTION ENCOUNTER (OUTPATIENT)
Age: 88
End: 2023-10-09

## 2023-10-09 ENCOUNTER — NON-APPOINTMENT (OUTPATIENT)
Age: 88
End: 2023-10-09

## 2023-10-09 VITALS
TEMPERATURE: 98 F | SYSTOLIC BLOOD PRESSURE: 118 MMHG | RESPIRATION RATE: 16 BRPM | HEART RATE: 89 BPM | OXYGEN SATURATION: 94 % | DIASTOLIC BLOOD PRESSURE: 80 MMHG

## 2023-10-09 DIAGNOSIS — E87.6 HYPOKALEMIA: ICD-10-CM

## 2023-10-09 DIAGNOSIS — J18.9 PNEUMONIA, UNSPECIFIED ORGANISM: ICD-10-CM

## 2023-10-09 DIAGNOSIS — E11.9 TYPE 2 DIABETES MELLITUS WITHOUT COMPLICATIONS: ICD-10-CM

## 2023-10-09 DIAGNOSIS — R53.81 OTHER MALAISE: ICD-10-CM

## 2023-10-09 LAB
GLUCOSE BLDC GLUCOMTR-MCNC: 159 MG/DL — HIGH (ref 70–99)
GLUCOSE BLDC GLUCOMTR-MCNC: 170 MG/DL — HIGH (ref 70–99)
MAGNESIUM SERPL-MCNC: 2.1 MG/DL — SIGNIFICANT CHANGE UP (ref 1.6–2.6)

## 2023-10-09 PROCEDURE — 99239 HOSP IP/OBS DSCHRG MGMT >30: CPT

## 2023-10-09 RX ORDER — CEFTRIAXONE 500 MG/1
2000 INJECTION, POWDER, FOR SOLUTION INTRAMUSCULAR; INTRAVENOUS EVERY 24 HOURS
Refills: 0 | Status: DISCONTINUED | OUTPATIENT
Start: 2023-10-09 | End: 2023-10-09

## 2023-10-09 RX ADMIN — Medication 2: at 13:49

## 2023-10-09 RX ADMIN — Medication 75 MICROGRAM(S): at 07:24

## 2023-10-09 NOTE — PROGRESS NOTE ADULT - TIME-BASED
50 - Pt with dizziness upon exertion, denies any CP.  Pt s/p ETT which had to be terminated due to symptoms of dizziness and had increased frequency of VPDs  - Continue with beta blocker, f/u EP recs  - Check orthostatics, monitor on tele

## 2023-10-09 NOTE — PROGRESS NOTE ADULT - PROBLEM SELECTOR PLAN 5
repleted
On repaglinide 2mg TID.     Plan:  - mISS  - f/u A1c

## 2023-10-09 NOTE — PROGRESS NOTE ADULT - SUBJECTIVE AND OBJECTIVE BOX
Patient is a 89y old  Female who presents with a chief complaint of Weakness (08 Oct 2023 08:47)      INTERVAL HPI/OVERNIGHT EVENTS:    Pt. seen and examined earlier today  Pt. reports feeling well, feels ready to leave the hospital  Pt. denies F/C, CP, SOB, cough    Review of Systems: 12 point review of systems otherwise negative    MEDICATIONS  (STANDING):  cefTRIAXone   IVPB 2000 milliGRAM(s) IV Intermittent every 24 hours  dextrose 5%. 1000 milliLiter(s) (100 mL/Hr) IV Continuous <Continuous>  dextrose 5%. 1000 milliLiter(s) (50 mL/Hr) IV Continuous <Continuous>  dextrose 50% Injectable 25 Gram(s) IV Push once  dextrose 50% Injectable 25 Gram(s) IV Push once  dextrose 50% Injectable 12.5 Gram(s) IV Push once  enoxaparin Injectable 40 milliGRAM(s) SubCutaneous every 24 hours  glucagon  Injectable 1 milliGRAM(s) IntraMuscular once  influenza  Vaccine (HIGH DOSE) 0.7 milliLiter(s) IntraMuscular once  insulin lispro (ADMELOG) corrective regimen sliding scale   SubCutaneous at bedtime  insulin lispro (ADMELOG) corrective regimen sliding scale   SubCutaneous three times a day before meals  levothyroxine 75 MICROGram(s) Oral every 24 hours    MEDICATIONS  (PRN):  acetaminophen     Tablet .. 650 milliGRAM(s) Oral every 6 hours PRN Temp greater or equal to 38C (100.4F), Mild Pain (1 - 3)  dextrose Oral Gel 15 Gram(s) Oral once PRN Blood Glucose LESS THAN 70 milliGRAM(s)/deciliter      Allergies    No Known Allergies    Intolerances          Vital Signs Last 24 Hrs  T(C): 36.8 (09 Oct 2023 05:36), Max: 36.9 (08 Oct 2023 15:22)  T(F): 98.3 (09 Oct 2023 05:36), Max: 98.4 (08 Oct 2023 15:22)  HR: 89 (09 Oct 2023 05:36) (77 - 89)  BP: 118/80 (09 Oct 2023 05:36) (106/57 - 119/65)  BP(mean): 73 (08 Oct 2023 20:30) (73 - 73)  RR: 16 (09 Oct 2023 05:36) (16 - 18)  SpO2: 94% (09 Oct 2023 05:36) (94% - 96%)    Parameters below as of 08 Oct 2023 20:30  Patient On (Oxygen Delivery Method): room air      CAPILLARY BLOOD GLUCOSE      POCT Blood Glucose.: 159 mg/dL (09 Oct 2023 09:09)  POCT Blood Glucose.: 135 mg/dL (08 Oct 2023 22:09)  POCT Blood Glucose.: 209 mg/dL (08 Oct 2023 18:05)      10-08 @ 07:01  -  10-09 @ 07:00  --------------------------------------------------------  IN: 0 mL / OUT: 1800 mL / NET: -1800 mL        Physical Exam:  (earlier today)  Daily     Daily   General:  comfortable-appearing in NAD  HEENT:  MMM  CV:  RRR, no JVD  Lungs:  CTA B/L, normal WOB on RA  Abdomen:  soft NT ND  Extremities:  no edema B/L LE  Skin:  WWP  :  +PrimaFit  Neuro:  AAOx3    LABS:                        11.8   5.95  )-----------( 230      ( 08 Oct 2023 12:12 )             36.9     10-08    139  |  103  |  12  ----------------------------<  180<H>  4.2   |  26  |  0.68    Ca    9.1      08 Oct 2023 12:12  Phos  2.9     10-08  Mg     2.1     10-09    TPro  6.8  /  Alb  3.5  /  TBili  0.3  /  DBili  x   /  AST  14  /  ALT  10  /  AlkPhos  73  10-08      Urinalysis Basic - ( 08 Oct 2023 12:12 )    Color: x / Appearance: x / SG: x / pH: x  Gluc: 180 mg/dL / Ketone: x  / Bili: x / Urobili: x   Blood: x / Protein: x / Nitrite: x   Leuk Esterase: x / RBC: x / WBC x   Sq Epi: x / Non Sq Epi: x / Bacteria: x

## 2023-10-09 NOTE — PROGRESS NOTE ADULT - PROBLEM SELECTOR PROBLEM 2
Gram-negative pneumonia
Controlled type 2 diabetes mellitus, without long-term current use of insulin
Gram-negative pneumonia
Gram-negative pneumonia

## 2023-10-09 NOTE — PROGRESS NOTE ADULT - PROBLEM SELECTOR PLAN 1
CXR reviewed, shows R perihilar opacities; clinically resolved, completed 5-day course of abx; no concern for aspiration, per SLP assessment

## 2023-10-09 NOTE — PROGRESS NOTE ADULT - ASSESSMENT
89F Croatian-speaking with PMHx hypothyroidism, HLD, DM presents to [facility] ED with fever, HA and generalized weakness x 1d, admitted for sepsis 2/2 likely PNA. 
89F Bulgarian-speaking with PMHx hypothyroidism, HLD, DM presents to [facility] ED with fever, HA and generalized weakness x 1d, admitted for sepsis 2/2 likely PNA. 
89 y/o F w/
89F Georgian-speaking with PMHx hypothyroidism, HLD, DM presents to [facility] ED with fever, HA and generalized weakness x 1d, admitted for sepsis 2/2 likely PNA.

## 2023-10-09 NOTE — PROGRESS NOTE ADULT - PROBLEM SELECTOR PLAN 2
Repeat CXR significant for increased opacifications in the R lower/middle lobe as compared to admission CXR.     - Obtain PA lateral CXR  - Continue treatment as stated above  - Given location of consolidation, obtain S&S  - OOBTC
monitor blood glucose, cont. diabetic diet, hold Prandin while inpatient
Pt w/ no respiratory symptoms. Repeat CXR significant for increased opacifications in the R lower/middle lobe as compared to admission CXR.     - Obtain PA lateral CXR  - Continue treatment as stated above  - Given location of consolidation, obtain S&S  - OOBTC
Repeat CXR significant for increased opacifications in the R lower/middle lobe as compared to admission CXR.     - PA lateral CXR no change from original xray   - Continue treatment as stated above  - Obtain S&S - passed for regular diet  - OOBTC

## 2023-10-09 NOTE — PROGRESS NOTE ADULT - PROBLEM SELECTOR PLAN 4
cont. PT, OOB to chair, d/c planning to [facility]
Hx of hypothyroidism. On levothyroxine 75mcg qd.     Plan:  - C/w levothyroxine 75mcg qd  (no need to obtain TSH now as pt has acute infection and will likely not be accurate)

## 2023-10-09 NOTE — PROGRESS NOTE ADULT - TIME BILLING
coordination of care  direct patient encounter  reviewed labs and images  d/c planning  d/w Medicine residents on rounds

## 2023-10-09 NOTE — PROGRESS NOTE ADULT - PROBLEM SELECTOR PLAN 3
Bilirubin on admission 2.2. LFTS wnl. Denying RUQ pain, no juandice, and anicteric sclera. Most likely infectious marker.   10/6 Bilirubin 0.9  - CTM
cont. levothyroxine
Bilirubin on admission 2.2. LFTS wnl. Denying RUQ pain, no juandice, and anicteric sclera. Most likely infectious marker.   10/6 Bilirubin 0.9  - CTM
Bilirubin on admission 2.2. LFTS wnl. Denying RUQ pain, no juandice, and anicteric sclera. Most likely infectious marker.   10/6 Bilirubin 0.9  - CTM

## 2023-10-09 NOTE — DISCHARGE NOTE NURSING/CASE MANAGEMENT/SOCIAL WORK - PATIENT PORTAL LINK FT
You can access the FollowMyHealth Patient Portal offered by [facility] by registering at the following website: [email or web address]. By joining [employer]’s FollowMyHealth portal, you will also be able to view your health information using other applications (apps) compatible with our system.

## 2023-10-10 LAB
CULTURE RESULTS: SIGNIFICANT CHANGE UP
CULTURE RESULTS: SIGNIFICANT CHANGE UP
SPECIMEN SOURCE: SIGNIFICANT CHANGE UP
SPECIMEN SOURCE: SIGNIFICANT CHANGE UP

## 2023-11-13 PROCEDURE — 80053 COMPREHEN METABOLIC PANEL: CPT

## 2023-11-13 PROCEDURE — 83735 ASSAY OF MAGNESIUM: CPT

## 2023-11-13 PROCEDURE — 87040 BLOOD CULTURE FOR BACTERIA: CPT

## 2023-11-13 PROCEDURE — 36415 COLL VENOUS BLD VENIPUNCTURE: CPT

## 2023-11-13 PROCEDURE — 85610 PROTHROMBIN TIME: CPT

## 2023-11-13 PROCEDURE — 87449 NOS EACH ORGANISM AG IA: CPT

## 2023-11-13 PROCEDURE — 84100 ASSAY OF PHOSPHORUS: CPT

## 2023-11-13 PROCEDURE — 85025 COMPLETE CBC W/AUTO DIFF WBC: CPT

## 2023-11-13 PROCEDURE — 93005 ELECTROCARDIOGRAM TRACING: CPT

## 2023-11-13 PROCEDURE — 87637 SARSCOV2&INF A&B&RSV AMP PRB: CPT

## 2023-11-13 PROCEDURE — 83605 ASSAY OF LACTIC ACID: CPT

## 2023-11-13 PROCEDURE — 71045 X-RAY EXAM CHEST 1 VIEW: CPT

## 2023-11-13 PROCEDURE — 96361 HYDRATE IV INFUSION ADD-ON: CPT

## 2023-11-13 PROCEDURE — 82962 GLUCOSE BLOOD TEST: CPT

## 2023-11-13 PROCEDURE — 85730 THROMBOPLASTIN TIME PARTIAL: CPT

## 2023-11-13 PROCEDURE — 0225U NFCT DS DNA&RNA 21 SARSCOV2: CPT

## 2023-11-13 PROCEDURE — 87641 MR-STAPH DNA AMP PROBE: CPT

## 2023-11-13 PROCEDURE — 96365 THER/PROPH/DIAG IV INF INIT: CPT

## 2023-11-13 PROCEDURE — 92610 EVALUATE SWALLOWING FUNCTION: CPT

## 2023-11-13 PROCEDURE — 99285 EMERGENCY DEPT VISIT HI MDM: CPT | Mod: 25

## 2023-11-13 PROCEDURE — 87899 AGENT NOS ASSAY W/OPTIC: CPT

## 2023-11-13 PROCEDURE — 85027 COMPLETE CBC AUTOMATED: CPT

## 2023-11-13 PROCEDURE — 97161 PT EVAL LOW COMPLEX 20 MIN: CPT

## 2023-11-13 PROCEDURE — 87640 STAPH A DNA AMP PROBE: CPT

## 2023-11-13 PROCEDURE — 84145 PROCALCITONIN (PCT): CPT

## 2023-11-13 PROCEDURE — 87086 URINE CULTURE/COLONY COUNT: CPT

## 2023-11-13 PROCEDURE — 71048 X-RAY EXAM CHEST 4+ VIEWS: CPT

## 2023-11-13 PROCEDURE — 81001 URINALYSIS AUTO W/SCOPE: CPT

## 2023-11-20 ENCOUNTER — NON-APPOINTMENT (OUTPATIENT)
Age: 88
End: 2023-11-20

## 2023-11-27 ENCOUNTER — APPOINTMENT (OUTPATIENT)
Dept: HOME HEALTH SERVICES | Facility: HOME HEALTH | Age: 88
End: 2023-11-27
Payer: MEDICARE

## 2023-11-27 VITALS
OXYGEN SATURATION: 97 % | RESPIRATION RATE: 18 BRPM | SYSTOLIC BLOOD PRESSURE: 130 MMHG | DIASTOLIC BLOOD PRESSURE: 70 MMHG | TEMPERATURE: 97 F | HEART RATE: 82 BPM

## 2023-11-27 PROCEDURE — 99496 TRANSJ CARE MGMT HIGH F2F 7D: CPT

## 2023-11-27 PROCEDURE — 99349 HOME/RES VST EST MOD MDM 40: CPT

## 2023-12-04 ENCOUNTER — APPOINTMENT (OUTPATIENT)
Dept: ORTHOPEDIC SURGERY | Facility: CLINIC | Age: 88
End: 2023-12-04
Payer: MEDICARE

## 2023-12-04 PROCEDURE — 99213 OFFICE O/P EST LOW 20 MIN: CPT | Mod: 25

## 2023-12-04 PROCEDURE — 20611 DRAIN/INJ JOINT/BURSA W/US: CPT | Mod: LT

## 2023-12-09 ENCOUNTER — NON-APPOINTMENT (OUTPATIENT)
Age: 88
End: 2023-12-09

## 2023-12-10 ENCOUNTER — TRANSCRIPTION ENCOUNTER (OUTPATIENT)
Age: 88
End: 2023-12-10

## 2023-12-13 LAB
INFLUENZA A RESULT: NOT DETECTED
INFLUENZA B RESULT: NOT DETECTED
RESP SYN VIRUS RESULT: NOT DETECTED
SARS-COV-2 RESULT: DETECTED

## 2023-12-13 NOTE — COUNSELING
[FreeTextEntry4] : Educated patient/legal representative about CCM services and consent.\par  Educated patient/legal representative that this service is available because they have 2 or more chronic conditions, that only one Provider can furnish CCM Services per calendar month and that CCM services are subject to the usual Medicare deductible and coinsurance. \par  Patient/legal representative understands the right to stop the CCM services at any time by notifying [facility] office.\par  Patient/legal representative has verbally consented 5/2022\par  \par

## 2023-12-13 NOTE — HISTORY OF PRESENT ILLNESS
[FreeTextEntry1] : type II DM, hypothyroidism, HLD, and s/p Pfizer x2 who p/w ERICKSON, decreased PO intake, and hypoxia x3d. Found to be COVID+ with BL infiltrates on CXR. Admitted to [facility] 5/11/22. [FreeTextEntry2] : 89 yo F with type II DM, hypothyroidism, hyperlipidemia. Seen by ortho after incidental x-ray of the knee showed a popliteal cyst. Ortho drained the cyst and injected joint with a steriod. Denies knee pain currently. Admitted 10/2023 for CAP.  Spent 3 weeks in Rehab discharged home.   No respiratory issues.  Reports bilateral knee pain receiving physical therapy at home. States she will have her daughter arrange for a follow up appointment with the orthopedic team.    Patient denies fever, cough, trouble breathing, rash, vomiting and diarrhea. Patient has not been in close contact with someone covid positive.  N95 mask, gloves, eye wear and gown used during visit: [Y]. Total face to face time with patient is 60 min.  Patient/ patient's caregiver reports no weight loss >10lbs in the past 6 months. No changes in dentition or swallowing reported, No changes in hearing or vision reported. No changes in Cognition reported. Patient denies any symptoms of depression or anxiety. Patient is ADL and IADL dependent. No changes in gait or falls reported. Patient's home environment is safe.

## 2023-12-13 NOTE — REASON FOR VISIT
[FreeTextEntry1] : type II DM, hypothyroidism, HLD, and s/p Pfizer x2 who p/w ERICKSON, decreased PO intake, and hypoxia x3d. Found to be COVID+ with BL infiltrates on CXR. Admitted to [facility] 5/11/22. [FreeTextEntry2] : AUBREY

## 2023-12-15 ENCOUNTER — NON-APPOINTMENT (OUTPATIENT)
Age: 88
End: 2023-12-15

## 2024-01-11 ENCOUNTER — NON-APPOINTMENT (OUTPATIENT)
Age: 89
End: 2024-01-11

## 2024-01-12 ENCOUNTER — APPOINTMENT (OUTPATIENT)
Dept: HOME HEALTH SERVICES | Facility: HOME HEALTH | Age: 89
End: 2024-01-12

## 2024-01-12 VITALS
RESPIRATION RATE: 17 BRPM | OXYGEN SATURATION: 95 % | SYSTOLIC BLOOD PRESSURE: 110 MMHG | DIASTOLIC BLOOD PRESSURE: 70 MMHG | TEMPERATURE: 97.9 F | HEART RATE: 88 BPM

## 2024-01-12 RX ORDER — GUAIFENESIN/DEXTROMETHORPHAN 100-10MG/5
100-10 LIQUID (ML) ORAL EVERY 4 HOURS
Qty: 1 | Refills: 0 | Status: COMPLETED | COMMUNITY
Start: 2023-12-13 | End: 2023-12-20

## 2024-01-12 RX ORDER — DEXTROMETHORPHAN HYDROBROMIDE AND GUAIFENESIN 20; 400 MG/20ML; MG/20ML
5-100 SOLUTION ORAL EVERY 4 HOURS
Qty: 10 | Refills: 0 | Status: COMPLETED | COMMUNITY
Start: 2023-12-15 | End: 2024-01-12

## 2024-01-31 ENCOUNTER — NON-APPOINTMENT (OUTPATIENT)
Age: 89
End: 2024-01-31

## 2024-01-31 ENCOUNTER — TRANSCRIPTION ENCOUNTER (OUTPATIENT)
Age: 89
End: 2024-01-31

## 2024-01-31 DIAGNOSIS — R05.9 COUGH, UNSPECIFIED: ICD-10-CM

## 2024-02-02 LAB
ALBUMIN SERPL ELPH-MCNC: 3.8 G/DL
ALP BLD-CCNC: 82 U/L
ALT SERPL-CCNC: 15 U/L
ANION GAP SERPL CALC-SCNC: 17 MMOL/L
AST SERPL-CCNC: 22 U/L
BASOPHILS # BLD AUTO: 0.03 K/UL
BASOPHILS NFR BLD AUTO: 0.4 %
BILIRUB SERPL-MCNC: 0.7 MG/DL
BUN SERPL-MCNC: 15 MG/DL
CALCIUM SERPL-MCNC: 9.1 MG/DL
CHLORIDE SERPL-SCNC: 100 MMOL/L
CO2 SERPL-SCNC: 20 MMOL/L
CREAT SERPL-MCNC: 0.79 MG/DL
EGFR: 68 ML/MIN/1.73M2
EOSINOPHIL # BLD AUTO: 0.17 K/UL
EOSINOPHIL NFR BLD AUTO: 2.3 %
GLUCOSE SERPL-MCNC: 184 MG/DL
HCT VFR BLD CALC: 39.2 %
HGB BLD-MCNC: 12.1 G/DL
IMM GRANULOCYTES NFR BLD AUTO: 0.4 %
INFLUENZA A RESULT: DETECTED
INFLUENZA B RESULT: NOT DETECTED
LYMPHOCYTES # BLD AUTO: 1 K/UL
LYMPHOCYTES NFR BLD AUTO: 13.7 %
MAN DIFF?: NORMAL
MCHC RBC-ENTMCNC: 28.5 PG
MCHC RBC-ENTMCNC: 30.9 GM/DL
MCV RBC AUTO: 92.5 FL
MONOCYTES # BLD AUTO: 0.7 K/UL
MONOCYTES NFR BLD AUTO: 9.6 %
NEUTROPHILS # BLD AUTO: 5.37 K/UL
NEUTROPHILS NFR BLD AUTO: 73.6 %
PLATELET # BLD AUTO: 205 K/UL
POTASSIUM SERPL-SCNC: 4.1 MMOL/L
PROT SERPL-MCNC: 6.5 G/DL
RBC # BLD: 4.24 M/UL
RBC # FLD: 13.3 %
RESP SYN VIRUS RESULT: NOT DETECTED
SARS-COV-2 RESULT: NOT DETECTED
SODIUM SERPL-SCNC: 138 MMOL/L
WBC # FLD AUTO: 7.3 K/UL

## 2024-02-07 ENCOUNTER — NON-APPOINTMENT (OUTPATIENT)
Age: 89
End: 2024-02-07

## 2024-03-14 ENCOUNTER — NON-APPOINTMENT (OUTPATIENT)
Age: 89
End: 2024-03-14

## 2024-03-15 ENCOUNTER — APPOINTMENT (OUTPATIENT)
Dept: HOME HEALTH SERVICES | Facility: HOME HEALTH | Age: 89
End: 2024-03-15
Payer: MEDICARE

## 2024-03-15 VITALS
RESPIRATION RATE: 17 BRPM | SYSTOLIC BLOOD PRESSURE: 120 MMHG | HEART RATE: 80 BPM | TEMPERATURE: 97.8 F | OXYGEN SATURATION: 96 % | DIASTOLIC BLOOD PRESSURE: 70 MMHG

## 2024-03-15 DIAGNOSIS — R05.1 ACUTE COUGH: ICD-10-CM

## 2024-03-15 PROCEDURE — G0439: CPT

## 2024-03-15 RX ORDER — LEVOFLOXACIN 500 MG/1
500 TABLET, FILM COATED ORAL DAILY
Qty: 7 | Refills: 0 | Status: DISCONTINUED | COMMUNITY
Start: 2024-01-31 | End: 2024-03-15

## 2024-03-15 RX ORDER — LEVOTHYROXINE SODIUM 0.07 MG/1
75 TABLET ORAL
Qty: 90 | Refills: 3 | Status: DISCONTINUED | COMMUNITY
Start: 2022-05-18 | End: 2024-03-15

## 2024-03-15 RX ORDER — PREDNISONE 50 MG/1
50 TABLET ORAL DAILY
Qty: 3 | Refills: 0 | Status: ACTIVE | COMMUNITY
Start: 2024-03-15 | End: 1900-01-01
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
## 2024-03-15 NOTE — REASON FOR VISIT
[Subsequent Annual Medicare Wellness Visit] : a subsequent annual Medicare wellness visit [FreeTextEntry1] : type II DM, hypothyroidism, HLD, and s/p Pfizer x2 who p/w ERICKSON, decreased PO intake, and hypoxia x3d. Found to be COVID+ with BL infiltrates on CXR. Admitted to [facility] 5/11/22. [FreeTextEntry2] : Problem /Plan - 1: -  Problem: CAP (community acquired pneumonia). -  Plan: CXR reviewed, shows R perihilar opacities; clinically resolved, completed 5-day course of abx; no concern for aspiration, per SLP assessment.   Problem/Plan - 2: -  Problem: Controlled type 2 diabetes mellitus, without long-term current use of insulin. -  Plan: monitor blood glucose, cont. diabetic diet, hold Prandin while inpatient.   Problem/Plan - 3: -  Problem: Hypothyroidism. -  Plan: cont. levothyroxine.   Problem/Plan - 4: -  Problem: Debility. -  Plan: cont. PT, OOB to chair, d/c planning to [facility].   Problem/Plan - 5: -  Problem: Hypokalemia. -  Plan: repleted.

## 2024-03-15 NOTE — COUNSELING
[FreeTextEntry4] : Educated patient/legal representative about CCM services and consent.\par  Educated patient/legal representative that this service is available because they have 2 or more chronic conditions, that only one Provider can furnish CCM Services per calendar month and that CCM services are subject to the usual Medicare deductible and coinsurance. \par  Patient/legal representative understands the right to stop the CCM services at any time by notifying [facility] office.\par  Patient/legal representative has verbally consented 5/2022\par  \par   [Established patient, extensive review of history, medical and functional status, risk factors and patient education] : Established patient, extensive review of history, medical and functional status, risk factors and patient education and counseling provided for subsequent annual wellness visit

## 2024-03-18 ENCOUNTER — LABORATORY RESULT (OUTPATIENT)
Age: 89
End: 2024-03-18

## 2024-04-01 NOTE — PHYSICAL THERAPY INITIAL EVALUATION ADULT - REFERRING PHYSICIAN, REHAB EVAL
Caller: Lakeshia Joe    Relationship: Self    Best call back number: [phone]    What form or medical record are you requesting: MEDICAL DOCUMENT STATING SHE CANNOT RETURN TO WORK DUE TO UPPER ABDOMINAL PAIN UNTIL 04.04.2024    Who is requesting this form or medical record from you: EMPLOYER    How would you like to receive the form or medical records (pick-up, mail, fax):     Timeframe paperwork needed: AS SOON AS POSSIBLE    Additional notes: PATIENT STATED DUE TO THIS ABDOMINAL PAIN SHE HAS BEEN TO THE EMERGENCY ROOM 3 TIMES IN THE LAST WEEK. THE EMERGENCY ROOM IS STILL UNCLEAR ON WHAT COULD BE CAUSING THIS PAIN BUT SHE CANNOT WORK DUE TO IT AND NEEDS A MEDICAL NOTE STATING THIS.      Jay Haney

## 2024-04-09 ENCOUNTER — APPOINTMENT (OUTPATIENT)
Dept: HOME HEALTH SERVICES | Facility: HOME HEALTH | Age: 89
End: 2024-04-09

## 2024-04-09 VITALS
DIASTOLIC BLOOD PRESSURE: 70 MMHG | SYSTOLIC BLOOD PRESSURE: 120 MMHG | TEMPERATURE: 97.5 F | OXYGEN SATURATION: 95 % | HEART RATE: 99 BPM | RESPIRATION RATE: 16 BRPM

## 2024-04-09 DIAGNOSIS — H10.30 UNSPECIFIED ACUTE CONJUNCTIVITIS, UNSPECIFIED EYE: ICD-10-CM

## 2024-04-09 RX ORDER — TOBRAMYCIN 3 MG/ML
0.3 SOLUTION/ DROPS OPHTHALMIC
Qty: 1 | Refills: 0 | Status: ACTIVE | COMMUNITY
Start: 2024-04-09 | End: 1900-01-01

## 2024-04-09 RX ORDER — DOCUSATE SODIUM 100 MG/1
100 CAPSULE ORAL
Qty: 90 | Refills: 3 | Status: ACTIVE | COMMUNITY
Start: 2022-10-12

## 2024-04-09 RX ORDER — POLYETHYLENE GLYCOL 400 AND PROPYLENE GLYCOL 4; 3 MG/ML; MG/ML
0.4-0.3 SOLUTION/ DROPS OPHTHALMIC
Qty: 1 | Refills: 3 | Status: ACTIVE | COMMUNITY
Start: 2024-04-09 | End: 1900-01-01

## 2024-04-09 RX ORDER — REPAGLINIDE 2 MG/1
2 TABLET ORAL 3 TIMES DAILY
Qty: 270 | Refills: 3 | Status: ACTIVE | COMMUNITY
Start: 2022-05-18

## 2024-04-09 RX ORDER — ERGOCALCIFEROL 1.25 MG/1
1.25 MG CAPSULE, LIQUID FILLED ORAL
Qty: 13 | Refills: 3 | Status: ACTIVE | COMMUNITY
Start: 2022-05-21

## 2024-05-03 ENCOUNTER — APPOINTMENT (OUTPATIENT)
Dept: ORTHOPEDIC SURGERY | Facility: CLINIC | Age: 89
End: 2024-05-03
Payer: MEDICARE

## 2024-05-03 PROCEDURE — 99213 OFFICE O/P EST LOW 20 MIN: CPT | Mod: 25

## 2024-05-03 PROCEDURE — 20611 DRAIN/INJ JOINT/BURSA W/US: CPT | Mod: LT

## 2024-05-03 RX ORDER — HYALURONATE SODIUM, STABILIZED 88 MG/4 ML
88 SYRINGE (ML) INTRAARTICULAR
Qty: 1 | Refills: 0 | Status: ACTIVE | OUTPATIENT
Start: 2024-05-03

## 2024-05-03 NOTE — HISTORY OF PRESENT ILLNESS
[de-identified] : LEFT KNEE PAIN  FOLLOW UP  PAIN LEVEL: 8/10 MONOVISC INJECTION DECEMBER 4 , 2023 WAS HELPFUL  REQUESTING CORTISONE INJECTION FOR KNEE PAIN PRIOR TO TRAVELLING   PREVIOUS HPI LOCATION:  LEFT LEG  POPLITEAL CYST  DURATION: 10 DAYS  QUALITY: SHARP  CONSTANT  PAIN LEVEL: 8/10  BETTER WITH REST, PAIN KILLER  WORSE WITH BENDING, WALKING PRIOR STUDIES: ULTRASOUND AUGT 11, 2023

## 2024-05-03 NOTE — PROCEDURE
[de-identified] : INJECTION ASPIRATION LEFT KNEE  Patient has demonstrated limited relief from NSAIDS, rest, exercises / PT , and after discussion of the risks and benefits, the patient has elected to proceed with a n ULTRASOUND GUIDED corticosteroid injection into the LEFT SupeLat PF Knee   Confirmed that the patient does not have history of prior adverse reactions, active, infections, or relevant allergies. There was no effusion, erythema, or warmth, and the skin was clear  The skin was sterilized with alcohol. Ethyl Chloride was used as a topical anesthetic. Routine sterile technique.    The KNEE JOINT  was injected utilizing ULTRASOUND GUIDANCEwith KENALOG + LIDOCAINE. The injection was completed without complication and a bandage was applied.  The patient tolerated the procedure well and was given post-injection instructions.Rec: Cold therapy, analgesics, avoid heavy activity.  MEDICATION: Lidocaine 1% 4cc + 10mg of Kenalog LOT# [id number]  EXP 08/24  EFFUSION ASPIRATED: none

## 2024-05-03 NOTE — DISCUSSION/SUMMARY
[de-identified] : XRAYS REVEAL KNEE OSTEOARTHRITIS LITTLE RELIEF FROM NSAIDS , EXERCISES PATIENT HAS ELECTED TO PROCEED WITH KENALOG INJECTION KNEE  RISKS AND BENEFITS DISCUSSED - VERBAL CONSENT OBTAINED SEE PROCEDURE NOTE     POST INJECTION INSTRUCTIONS:   INJECTION THERAPY HANDOUT PROVIDED   COLD THERAPY , ANALGESICS PRN   HOME STRETCHING AND EXERCISES QD  -  KNEE OA HANDOUT ELASTIC KNEE SUPPORT PRN ARCH SUPPORTS OR SUPPORTIVE SHOES WITH ARCH SUPPORT   MONOVISC ORDERDED

## 2024-05-03 NOTE — PHYSICAL EXAM
[de-identified] : PHYSICAL EXAM LEFT KNEE  AROM EXTENSION = 0 FLEXION = 110  SPECIAL TESTS  PATELLAR GRIND = NEG DRAWER  = NEG LACHMAN = NEG MACMURRAY = NEG   MOTOR = GROSSLY INTACT SENSORY = GROSSLY INTACT

## 2024-05-08 ENCOUNTER — APPOINTMENT (OUTPATIENT)
Dept: HOME HEALTH SERVICES | Facility: HOME HEALTH | Age: 89
End: 2024-05-08
Payer: MEDICARE

## 2024-05-08 VITALS
SYSTOLIC BLOOD PRESSURE: 130 MMHG | TEMPERATURE: 97 F | DIASTOLIC BLOOD PRESSURE: 70 MMHG | HEART RATE: 96 BPM | OXYGEN SATURATION: 96 % | RESPIRATION RATE: 18 BRPM

## 2024-05-08 DIAGNOSIS — A41.9 SEPSIS, UNSPECIFIED ORGANISM: ICD-10-CM

## 2024-05-08 DIAGNOSIS — R53.1 WEAKNESS: ICD-10-CM

## 2024-05-08 DIAGNOSIS — E03.9 HYPOTHYROIDISM, UNSPECIFIED: ICD-10-CM

## 2024-05-08 PROCEDURE — 99349 HOME/RES VST EST MOD MDM 40: CPT

## 2024-05-08 NOTE — REASON FOR VISIT
[Follow-Up] : a follow-up visit [Family Member] : family member [Formal Caregiver] : formal caregiver [Pre-Visit Preparation] : pre-visit preparation was done [FreeTextEntry1] : type II DM, hypothyroidism, HLD, and s/p Pfizer x2 who p/w ERICKSON, decreased PO intake, and hypoxia x3d. Found to be COVID+ with BL infiltrates on CXR. Admitted to [facility] 5/11/22. [Admitted on: ___] : The patient was admitted on [unfilled] [Discharged on ___] : discharged on [unfilled] [Discharge Summary] : discharge summary [FreeTextEntry2] : Problem /Plan - 1: -  Problem: CAP (community acquired pneumonia). -  Plan: CXR reviewed, shows R perihilar opacities; clinically resolved, completed 5-day course of abx; no concern for aspiration, per SLP assessment.   Problem/Plan - 2: -  Problem: Controlled type 2 diabetes mellitus, without long-term current use of insulin. -  Plan: monitor blood glucose, cont. diabetic diet, hold Prandin while inpatient.   Problem/Plan - 3: -  Problem: Hypothyroidism. -  Plan: cont. levothyroxine.   Problem/Plan - 4: -  Problem: Debility. -  Plan: cont. PT, OOB to chair, d/c planning to [facility].   Problem/Plan - 5: -  Problem: Hypokalemia. -  Plan: repleted.

## 2024-05-08 NOTE — HISTORY OF PRESENT ILLNESS
[Family Member] : family member [FreeTextEntry1] : type II DM, hypothyroidism, HLD, and s/p Pfizer x2 who p/w ERICKSON, decreased PO intake, and hypoxia x3d. Found to be COVID+ with BL infiltrates on CXR. Admitted to [facility] 5/11/22. [FreeTextEntry2] : 89 yo F with type II DM, hypothyroidism, hyperlipidemia.  Patient with a lingering cough for almost 3 months.  Patient lives with her family and autistic son.   Daughter translating.  Seen today reported she received steriod injections in her left knee with pain relief.  Reports less strong when ambulating.  Recent death of her sister in [place].  Pending trip in June.  May be willing to receive physical therapy in an outpatient gym.  Nail fungus treatment requested.    Patient denies fever, cough, trouble breathing, rash, vomiting and diarrhea. Patient has not been in close contact with someone covid positive.  N95 mask, gloves, eye wear and gown used during visit: [Y]. Total face to face time with patient is 60 min.  Patient/ patient's caregiver reports no weight loss >10lbs in the past 6 months. No changes in dentition or swallowing reported, No changes in hearing or vision reported. No changes in Cognition reported. Patient denies any symptoms of depression or anxiety. Patient is ADL and IADL dependent. No changes in gait or falls reported. Patient's home environment is safe.

## 2024-05-08 NOTE — PHYSICAL EXAM
[No Acute Distress] : no acute distress [Well Nourished] : well nourished [Well Developed] : well developed [Normal Sclera/Conjunctiva] : normal sclera/conjunctiva [EOMI] : extra ocular movement intact [Normal Outer Ear/Nose] : the ears and nose were normal in appearance [Normal Oropharynx] : the oropharynx was normal [No Respiratory Distress] : no respiratory distress [Clear to Auscultation] : lungs were clear to auscultation bilaterally [No Accessory Muscle Use] : no accessory muscle use [Normal Rate] : heart rate was normal  [Regular Rhythm] : with a regular rhythm [Normal S1, S2] : normal S1 and S2 [No Murmurs] : no murmurs heard [No Edema] : there was no peripheral edema [Normal Bowel Sounds] : normal bowel sounds [Non Tender] : non-tender [Soft] : abdomen soft [Not Distended] : not distended [Normal Post Cervical Nodes] : no posterior cervical lymphadenopathy [Normal Anterior Cervical Nodes] : no anterior cervical lymphadenopathy [No CVA Tenderness] : no ~M costovertebral angle tenderness [No Spinal Tenderness] : no spinal tenderness [Normal Gait] : normal gait [Normal Strength/Tone] : muscle strength and tone were normal [No Rash] : no rash [Oriented x3] : oriented to person, place, and time [Normal Affect] : the affect was normal [de-identified] : impacted cerumen both ears

## 2024-05-08 NOTE — HEALTH RISK ASSESSMENT
[HRA Reviewed] : Health risk assessment reviewed [Independent] : feeding [Some assistance needed] : using telephone [Full assistance needed] : managing finances [Yes] : The patient does have visual impairment [TimeGetUpGo] : 20 [de-identified] : Patient able to ambulate short distances with walker

## 2024-05-08 NOTE — CHRONIC CARE ASSESSMENT
[Limited decision making ability] : limited decision making ability [de-identified] : as tolerated [de-identified] : diabetic diet [PPS Score: ____] : Palliative Performance Scale (PPS) Score: [unfilled] [FAST Score: ____] : Functional Assessment Scale (FAST) Score: [unfilled]

## 2024-06-26 ENCOUNTER — APPOINTMENT (OUTPATIENT)
Dept: HOME HEALTH SERVICES | Facility: HOME HEALTH | Age: 89
End: 2024-06-26
Payer: MEDICARE

## 2024-06-26 VITALS
RESPIRATION RATE: 18 BRPM | HEART RATE: 88 BPM | OXYGEN SATURATION: 96 % | TEMPERATURE: 97 F | SYSTOLIC BLOOD PRESSURE: 132 MMHG | DIASTOLIC BLOOD PRESSURE: 70 MMHG

## 2024-06-26 DIAGNOSIS — K59.09 OTHER CONSTIPATION: ICD-10-CM

## 2024-06-26 DIAGNOSIS — E11.9 TYPE 2 DIABETES MELLITUS W/OUT COMPLICATIONS: ICD-10-CM

## 2024-06-26 DIAGNOSIS — E53.8 DEFICIENCY OF OTHER SPECIFIED B GROUP VITAMINS: ICD-10-CM

## 2024-06-26 DIAGNOSIS — R60.0 LOCALIZED EDEMA: ICD-10-CM

## 2024-06-26 DIAGNOSIS — M17.12 UNILATERAL PRIMARY OSTEOARTHRITIS, LEFT KNEE: ICD-10-CM

## 2024-06-26 DIAGNOSIS — E87.1 HYPO-OSMOLALITY AND HYPONATREMIA: ICD-10-CM

## 2024-06-26 DIAGNOSIS — R26.89 OTHER ABNORMALITIES OF GAIT AND MOBILITY: ICD-10-CM

## 2024-06-26 DIAGNOSIS — M15.9 POLYOSTEOARTHRITIS, UNSPECIFIED: ICD-10-CM

## 2024-06-26 DIAGNOSIS — E55.9 VITAMIN D DEFICIENCY, UNSPECIFIED: ICD-10-CM

## 2024-06-26 DIAGNOSIS — E78.2 MIXED HYPERLIPIDEMIA: ICD-10-CM

## 2024-06-26 DIAGNOSIS — H90.3 SENSORINEURAL HEARING LOSS, BILATERAL: ICD-10-CM

## 2024-06-26 PROCEDURE — 99349 HOME/RES VST EST MOD MDM 40: CPT

## 2024-06-26 RX ORDER — CICLOPIROX 80 MG/ML
8 SOLUTION TOPICAL
Qty: 1 | Refills: 3 | Status: DISCONTINUED | COMMUNITY
Start: 2024-05-08 | End: 2024-06-26

## 2024-08-26 DIAGNOSIS — K59.09 OTHER CONSTIPATION: ICD-10-CM

## 2024-08-27 ENCOUNTER — APPOINTMENT (OUTPATIENT)
Dept: HOME HEALTH SERVICES | Facility: HOME HEALTH | Age: 89
End: 2024-08-27
Payer: MEDICARE

## 2024-08-27 VITALS
TEMPERATURE: 97 F | HEART RATE: 80 BPM | OXYGEN SATURATION: 96 % | SYSTOLIC BLOOD PRESSURE: 130 MMHG | DIASTOLIC BLOOD PRESSURE: 70 MMHG | RESPIRATION RATE: 18 BRPM

## 2024-08-27 DIAGNOSIS — R60.0 LOCALIZED EDEMA: ICD-10-CM

## 2024-08-27 DIAGNOSIS — M17.12 UNILATERAL PRIMARY OSTEOARTHRITIS, LEFT KNEE: ICD-10-CM

## 2024-08-27 DIAGNOSIS — E87.1 HYPO-OSMOLALITY AND HYPONATREMIA: ICD-10-CM

## 2024-08-27 DIAGNOSIS — M15.9 POLYOSTEOARTHRITIS, UNSPECIFIED: ICD-10-CM

## 2024-08-27 DIAGNOSIS — E03.9 HYPOTHYROIDISM, UNSPECIFIED: ICD-10-CM

## 2024-08-27 DIAGNOSIS — R26.89 OTHER ABNORMALITIES OF GAIT AND MOBILITY: ICD-10-CM

## 2024-08-27 DIAGNOSIS — E55.9 VITAMIN D DEFICIENCY, UNSPECIFIED: ICD-10-CM

## 2024-08-27 DIAGNOSIS — E11.9 TYPE 2 DIABETES MELLITUS W/OUT COMPLICATIONS: ICD-10-CM

## 2024-08-27 DIAGNOSIS — E78.2 MIXED HYPERLIPIDEMIA: ICD-10-CM

## 2024-08-27 PROCEDURE — 99349 HOME/RES VST EST MOD MDM 40: CPT

## 2024-08-27 NOTE — REASON FOR VISIT
[FreeTextEntry1] : type II DM, hypothyroidism, HLD, and s/p Pfizer x2 who p/w ERICKSON, decreased PO intake, and hypoxia x3d. Found to be COVID+ with BL infiltrates on CXR. Admitted to [facility] 5/11/22. [FreeTextEntry2] : AUBREY

## 2024-08-27 NOTE — REASON FOR VISIT
[FreeTextEntry1] : type II DM, hypothyroidism, HLD, and s/p Pfizer x2 who p/w ERICKSON, decreased PO intake, and hypoxia x3d. Found to be COVID+ with BL infiltrates on CXR. Admitted to [facility] 5/11/22. [FreeTextEntry2] : AUBREY

## 2024-08-27 NOTE — HISTORY OF PRESENT ILLNESS
[FreeTextEntry1] : type II DM, hypothyroidism, HLD, and s/p Pfizer x2 who p/w ERICKSON, decreased PO intake, and hypoxia x3d. Found to be COVID+ with BL infiltrates on CXR. Admitted to [facility] 5/11/22. [FreeTextEntry2] : 89 yo F with type II DM, hypothyroidism, hyperlipidemia.  Patient with a lingering cough for almost 3 months.  Patient lives with her family and autistic son. Daughter Bengali . Patient feels week and tired. Exam unremarkable will order labs.    Patient denies fever, cough, trouble breathing, rash, vomiting and diarrhea. Patient has not been in close contact with someone covid positive.  N95 mask, gloves, eye wear and gown used during visit: [Y]. Total face to face time with patient is 60 min.  Patient/ patient's caregiver reports no weight loss >10lbs in the past 6 months. No changes in dentition or swallowing reported, No changes in hearing or vision reported. No changes in Cognition reported. Patient denies any symptoms of depression or anxiety. Patient is ADL and IADL dependent. No changes in gait or falls reported. Patient's home environment is safe.

## 2024-08-27 NOTE — HISTORY OF PRESENT ILLNESS
[FreeTextEntry1] : type II DM, hypothyroidism, HLD, and s/p Pfizer x2 who p/w ERICKSON, decreased PO intake, and hypoxia x3d. Found to be COVID+ with BL infiltrates on CXR. Admitted to [facility] 5/11/22. [FreeTextEntry2] : 89 yo F with type II DM, hypothyroidism, hyperlipidemia.  Patient with a lingering cough for almost 3 months.  Patient lives with her family and autistic son. Daughter Tamazight . Patient feels week and tired. Exam unremarkable will order labs.    Patient denies fever, cough, trouble breathing, rash, vomiting and diarrhea. Patient has not been in close contact with someone covid positive.  N95 mask, gloves, eye wear and gown used during visit: [Y]. Total face to face time with patient is 60 min.  Patient/ patient's caregiver reports no weight loss >10lbs in the past 6 months. No changes in dentition or swallowing reported, No changes in hearing or vision reported. No changes in Cognition reported. Patient denies any symptoms of depression or anxiety. Patient is ADL and IADL dependent. No changes in gait or falls reported. Patient's home environment is safe.

## 2024-08-27 NOTE — HEALTH RISK ASSESSMENT
None known [TimeGetUpGo] : 20 [de-identified] : Patient able to ambulate short distances with walker

## 2024-08-30 ENCOUNTER — LABORATORY RESULT (OUTPATIENT)
Age: 89
End: 2024-08-30

## 2024-08-30 ENCOUNTER — NON-APPOINTMENT (OUTPATIENT)
Age: 89
End: 2024-08-30

## 2024-08-31 ENCOUNTER — NON-APPOINTMENT (OUTPATIENT)
Age: 89
End: 2024-08-31

## 2024-09-03 ENCOUNTER — NON-APPOINTMENT (OUTPATIENT)
Age: 89
End: 2024-09-03

## 2024-09-03 ENCOUNTER — LABORATORY RESULT (OUTPATIENT)
Age: 89
End: 2024-09-03

## 2024-10-08 ENCOUNTER — NON-APPOINTMENT (OUTPATIENT)
Age: 89
End: 2024-10-08

## 2024-10-08 ENCOUNTER — TRANSCRIPTION ENCOUNTER (OUTPATIENT)
Age: 89
End: 2024-10-08

## 2024-10-08 ENCOUNTER — APPOINTMENT (OUTPATIENT)
Dept: OPHTHALMOLOGY | Facility: CLINIC | Age: 89
End: 2024-10-08
Payer: MEDICARE

## 2024-10-08 PROCEDURE — 92014 COMPRE OPH EXAM EST PT 1/>: CPT

## 2024-10-08 PROCEDURE — 92250 FUNDUS PHOTOGRAPHY W/I&R: CPT

## 2024-10-11 ENCOUNTER — NON-APPOINTMENT (OUTPATIENT)
Age: 89
End: 2024-10-11

## 2024-10-14 ENCOUNTER — NON-APPOINTMENT (OUTPATIENT)
Age: 89
End: 2024-10-14

## 2024-10-14 ENCOUNTER — APPOINTMENT (OUTPATIENT)
Dept: OPHTHALMOLOGY | Facility: CLINIC | Age: 89
End: 2024-10-14
Payer: MEDICARE

## 2024-10-14 PROCEDURE — 92012 INTRM OPH EXAM EST PATIENT: CPT

## 2024-10-25 ENCOUNTER — RX RENEWAL (OUTPATIENT)
Age: 89
End: 2024-10-25

## 2024-11-06 ENCOUNTER — APPOINTMENT (OUTPATIENT)
Dept: HOME HEALTH SERVICES | Facility: HOME HEALTH | Age: 89
End: 2024-11-06

## 2024-11-06 VITALS
SYSTOLIC BLOOD PRESSURE: 132 MMHG | HEART RATE: 74 BPM | OXYGEN SATURATION: 96 % | TEMPERATURE: 97 F | RESPIRATION RATE: 18 BRPM | DIASTOLIC BLOOD PRESSURE: 70 MMHG

## 2024-11-06 DIAGNOSIS — R09.81 NASAL CONGESTION: ICD-10-CM

## 2024-11-06 DIAGNOSIS — M17.12 UNILATERAL PRIMARY OSTEOARTHRITIS, LEFT KNEE: ICD-10-CM

## 2024-11-06 DIAGNOSIS — E55.9 VITAMIN D DEFICIENCY, UNSPECIFIED: ICD-10-CM

## 2024-11-06 DIAGNOSIS — R26.89 OTHER ABNORMALITIES OF GAIT AND MOBILITY: ICD-10-CM

## 2024-11-06 DIAGNOSIS — E11.9 TYPE 2 DIABETES MELLITUS W/OUT COMPLICATIONS: ICD-10-CM

## 2024-11-06 DIAGNOSIS — K59.09 OTHER CONSTIPATION: ICD-10-CM

## 2024-11-06 DIAGNOSIS — M15.9 POLYOSTEOARTHRITIS, UNSPECIFIED: ICD-10-CM

## 2024-11-06 DIAGNOSIS — R60.0 LOCALIZED EDEMA: ICD-10-CM

## 2024-11-06 DIAGNOSIS — E78.2 MIXED HYPERLIPIDEMIA: ICD-10-CM

## 2024-11-06 DIAGNOSIS — E87.1 HYPO-OSMOLALITY AND HYPONATREMIA: ICD-10-CM

## 2024-11-06 DIAGNOSIS — E03.9 HYPOTHYROIDISM, UNSPECIFIED: ICD-10-CM

## 2024-11-06 DIAGNOSIS — H90.3 SENSORINEURAL HEARING LOSS, BILATERAL: ICD-10-CM

## 2024-11-06 PROCEDURE — 99349 HOME/RES VST EST MOD MDM 40: CPT

## 2024-11-06 RX ORDER — FLUTICASONE FUROATE 27.5 UG/1
27.5 SPRAY, METERED NASAL DAILY
Qty: 1 | Refills: 3 | Status: ACTIVE | COMMUNITY
Start: 2024-11-06 | End: 1900-01-01

## 2024-11-07 ENCOUNTER — LABORATORY RESULT (OUTPATIENT)
Age: 89
End: 2024-11-07

## 2024-12-06 ENCOUNTER — APPOINTMENT (OUTPATIENT)
Dept: HOME HEALTH SERVICES | Facility: HOME HEALTH | Age: 88
End: 2024-12-06

## 2024-12-27 DIAGNOSIS — L30.8 OTHER SPECIFIED DERMATITIS: ICD-10-CM

## 2024-12-27 RX ORDER — NYSTATIN AND TRIAMCINOLONE ACETONIDE 100000; 1 MG/G; MG/G
100000-0.1 CREAM TOPICAL TWICE DAILY
Qty: 2 | Refills: 5 | Status: ACTIVE | COMMUNITY
Start: 2024-12-27 | End: 1900-01-01

## 2025-01-10 ENCOUNTER — APPOINTMENT (OUTPATIENT)
Dept: HOME HEALTH SERVICES | Facility: HOME HEALTH | Age: 89
End: 2025-01-10
Payer: MEDICARE

## 2025-01-10 VITALS
RESPIRATION RATE: 18 BRPM | DIASTOLIC BLOOD PRESSURE: 70 MMHG | SYSTOLIC BLOOD PRESSURE: 130 MMHG | HEART RATE: 70 BPM | OXYGEN SATURATION: 96 % | TEMPERATURE: 97 F

## 2025-01-10 DIAGNOSIS — R26.89 OTHER ABNORMALITIES OF GAIT AND MOBILITY: ICD-10-CM

## 2025-01-10 DIAGNOSIS — E87.1 HYPO-OSMOLALITY AND HYPONATREMIA: ICD-10-CM

## 2025-01-10 DIAGNOSIS — E53.8 DEFICIENCY OF OTHER SPECIFIED B GROUP VITAMINS: ICD-10-CM

## 2025-01-10 DIAGNOSIS — M15.9 POLYOSTEOARTHRITIS, UNSPECIFIED: ICD-10-CM

## 2025-01-10 DIAGNOSIS — E78.2 MIXED HYPERLIPIDEMIA: ICD-10-CM

## 2025-01-10 DIAGNOSIS — K59.09 OTHER CONSTIPATION: ICD-10-CM

## 2025-01-10 DIAGNOSIS — E55.9 VITAMIN D DEFICIENCY, UNSPECIFIED: ICD-10-CM

## 2025-01-10 DIAGNOSIS — E11.9 TYPE 2 DIABETES MELLITUS W/OUT COMPLICATIONS: ICD-10-CM

## 2025-01-10 DIAGNOSIS — H90.3 SENSORINEURAL HEARING LOSS, BILATERAL: ICD-10-CM

## 2025-01-10 DIAGNOSIS — M17.12 UNILATERAL PRIMARY OSTEOARTHRITIS, LEFT KNEE: ICD-10-CM

## 2025-01-10 DIAGNOSIS — E03.9 HYPOTHYROIDISM, UNSPECIFIED: ICD-10-CM

## 2025-01-10 PROCEDURE — 99349 HOME/RES VST EST MOD MDM 40: CPT

## 2025-01-10 RX ORDER — VITAMIN B COMPLEX
CAPSULE ORAL DAILY
Qty: 90 | Refills: 3 | Status: ACTIVE | COMMUNITY
Start: 2025-01-10 | End: 1900-01-01

## 2025-01-13 ENCOUNTER — LABORATORY RESULT (OUTPATIENT)
Age: 89
End: 2025-01-13

## 2025-01-21 ENCOUNTER — TRANSCRIPTION ENCOUNTER (OUTPATIENT)
Age: 89
End: 2025-01-21

## 2025-01-21 DIAGNOSIS — S91.309A UNSPECIFIED OPEN WOUND, UNSPECIFIED FOOT, INITIAL ENCOUNTER: ICD-10-CM

## 2025-01-21 RX ORDER — MUPIROCIN 20 MG/G
2 OINTMENT TOPICAL TWICE DAILY
Qty: 1 | Refills: 0 | Status: ACTIVE | COMMUNITY
Start: 2025-01-21 | End: 1900-01-01

## 2025-03-11 ENCOUNTER — NON-APPOINTMENT (OUTPATIENT)
Age: 89
End: 2025-03-11

## 2025-03-11 DIAGNOSIS — M25.551 PAIN IN RIGHT HIP: ICD-10-CM

## 2025-03-11 RX ORDER — CAPSAICIN 0.07 G/100G
0.07 CREAM TOPICAL 3 TIMES DAILY
Qty: 1 | Refills: 0 | Status: ACTIVE | COMMUNITY
Start: 2025-03-11 | End: 1900-01-01

## 2025-03-14 ENCOUNTER — NON-APPOINTMENT (OUTPATIENT)
Age: 89
End: 2025-03-14

## 2025-03-18 ENCOUNTER — APPOINTMENT (OUTPATIENT)
Dept: HOME HEALTH SERVICES | Facility: HOME HEALTH | Age: 89
End: 2025-03-18

## 2025-04-02 ENCOUNTER — TRANSCRIPTION ENCOUNTER (OUTPATIENT)
Age: 89
End: 2025-04-02

## 2025-04-02 ENCOUNTER — NON-APPOINTMENT (OUTPATIENT)
Age: 89
End: 2025-04-02

## 2025-04-04 ENCOUNTER — APPOINTMENT (OUTPATIENT)
Dept: HOME HEALTH SERVICES | Facility: HOME HEALTH | Age: 89
End: 2025-04-04
Payer: MEDICARE

## 2025-04-04 VITALS
DIASTOLIC BLOOD PRESSURE: 70 MMHG | HEART RATE: 74 BPM | RESPIRATION RATE: 18 BRPM | OXYGEN SATURATION: 96 % | TEMPERATURE: 97 F | SYSTOLIC BLOOD PRESSURE: 132 MMHG

## 2025-04-04 DIAGNOSIS — M15.9 POLYOSTEOARTHRITIS, UNSPECIFIED: ICD-10-CM

## 2025-04-04 DIAGNOSIS — K59.09 OTHER CONSTIPATION: ICD-10-CM

## 2025-04-04 DIAGNOSIS — E11.9 TYPE 2 DIABETES MELLITUS W/OUT COMPLICATIONS: ICD-10-CM

## 2025-04-04 DIAGNOSIS — E87.1 HYPO-OSMOLALITY AND HYPONATREMIA: ICD-10-CM

## 2025-04-04 DIAGNOSIS — E55.9 VITAMIN D DEFICIENCY, UNSPECIFIED: ICD-10-CM

## 2025-04-04 DIAGNOSIS — M17.12 UNILATERAL PRIMARY OSTEOARTHRITIS, LEFT KNEE: ICD-10-CM

## 2025-04-04 DIAGNOSIS — R26.89 OTHER ABNORMALITIES OF GAIT AND MOBILITY: ICD-10-CM

## 2025-04-04 DIAGNOSIS — E78.2 MIXED HYPERLIPIDEMIA: ICD-10-CM

## 2025-04-04 DIAGNOSIS — E03.9 HYPOTHYROIDISM, UNSPECIFIED: ICD-10-CM

## 2025-04-04 DIAGNOSIS — R60.0 LOCALIZED EDEMA: ICD-10-CM

## 2025-04-04 DIAGNOSIS — H90.3 SENSORINEURAL HEARING LOSS, BILATERAL: ICD-10-CM

## 2025-04-04 PROCEDURE — 99349 HOME/RES VST EST MOD MDM 40: CPT

## 2025-04-07 ENCOUNTER — NON-APPOINTMENT (OUTPATIENT)
Age: 89
End: 2025-04-07

## 2025-06-27 ENCOUNTER — APPOINTMENT (OUTPATIENT)
Dept: HOME HEALTH SERVICES | Facility: HOME HEALTH | Age: 89
End: 2025-06-27

## 2025-06-27 VITALS
OXYGEN SATURATION: 96 % | TEMPERATURE: 97 F | RESPIRATION RATE: 18 BRPM | SYSTOLIC BLOOD PRESSURE: 130 MMHG | DIASTOLIC BLOOD PRESSURE: 70 MMHG | HEART RATE: 70 BPM

## 2025-06-27 PROBLEM — R05.8 OTHER COUGH: Status: ACTIVE | Noted: 2025-06-27

## 2025-06-27 PROCEDURE — 99349 HOME/RES VST EST MOD MDM 40: CPT

## 2025-06-27 RX ORDER — AZITHROMYCIN 500 MG/1
500 TABLET, FILM COATED ORAL DAILY
Qty: 7 | Refills: 0 | Status: ACTIVE | COMMUNITY
Start: 2025-06-27 | End: 1900-01-01

## 2025-06-30 ENCOUNTER — NON-APPOINTMENT (OUTPATIENT)
Age: 89
End: 2025-06-30

## 2025-07-01 LAB
ALBUMIN SERPL ELPH-MCNC: 4 G/DL
ALP BLD-CCNC: 66 U/L
ALT SERPL-CCNC: 13 U/L
ANION GAP SERPL CALC-SCNC: 14 MMOL/L
AST SERPL-CCNC: 16 U/L
BILIRUB SERPL-MCNC: 0.5 MG/DL
BUN SERPL-MCNC: 18 MG/DL
CALCIUM SERPL-MCNC: 8.9 MG/DL
CHLORIDE SERPL-SCNC: 105 MMOL/L
CO2 SERPL-SCNC: 21 MMOL/L
CREAT SERPL-MCNC: 0.9 MG/DL
EGFRCR SERPLBLD CKD-EPI 2021: 58 ML/MIN/1.73M2
ESTIMATED AVERAGE GLUCOSE: 194 MG/DL
GLUCOSE SERPL-MCNC: 215 MG/DL
HBA1C MFR BLD HPLC: 8.4 %
HCT VFR BLD CALC: 34.4 %
HGB BLD-MCNC: 11.1 G/DL
MCHC RBC-ENTMCNC: 29.4 PG
MCHC RBC-ENTMCNC: 32.3 G/DL
MCV RBC AUTO: 91.2 FL
PLATELET # BLD AUTO: 211 K/UL
POTASSIUM SERPL-SCNC: 4.3 MMOL/L
PROT SERPL-MCNC: 6.4 G/DL
RBC # BLD: 3.77 M/UL
RBC # FLD: 13.7 %
SODIUM SERPL-SCNC: 141 MMOL/L
WBC # FLD AUTO: 5.35 K/UL

## 2025-07-22 ENCOUNTER — RX RENEWAL (OUTPATIENT)
Age: 89
End: 2025-07-22

## 2025-09-15 ENCOUNTER — NON-APPOINTMENT (OUTPATIENT)
Age: 89
End: 2025-09-15

## 2025-09-16 ENCOUNTER — APPOINTMENT (OUTPATIENT)
Dept: HOME HEALTH SERVICES | Facility: HOME HEALTH | Age: 89
End: 2025-09-16
Payer: MEDICARE

## 2025-09-16 VITALS
DIASTOLIC BLOOD PRESSURE: 70 MMHG | SYSTOLIC BLOOD PRESSURE: 130 MMHG | TEMPERATURE: 97 F | OXYGEN SATURATION: 96 % | RESPIRATION RATE: 18 BRPM | HEART RATE: 70 BPM

## 2025-09-16 DIAGNOSIS — M17.12 UNILATERAL PRIMARY OSTEOARTHRITIS, LEFT KNEE: ICD-10-CM

## 2025-09-16 DIAGNOSIS — E53.8 DEFICIENCY OF OTHER SPECIFIED B GROUP VITAMINS: ICD-10-CM

## 2025-09-16 DIAGNOSIS — K59.09 OTHER CONSTIPATION: ICD-10-CM

## 2025-09-16 DIAGNOSIS — E78.2 MIXED HYPERLIPIDEMIA: ICD-10-CM

## 2025-09-16 DIAGNOSIS — E03.9 HYPOTHYROIDISM, UNSPECIFIED: ICD-10-CM

## 2025-09-16 DIAGNOSIS — R60.0 LOCALIZED EDEMA: ICD-10-CM

## 2025-09-16 DIAGNOSIS — R26.89 OTHER ABNORMALITIES OF GAIT AND MOBILITY: ICD-10-CM

## 2025-09-16 DIAGNOSIS — E87.1 HYPO-OSMOLALITY AND HYPONATREMIA: ICD-10-CM

## 2025-09-16 DIAGNOSIS — E55.9 VITAMIN D DEFICIENCY, UNSPECIFIED: ICD-10-CM

## 2025-09-16 DIAGNOSIS — E11.9 TYPE 2 DIABETES MELLITUS W/OUT COMPLICATIONS: ICD-10-CM

## 2025-09-16 PROCEDURE — 99349 HOME/RES VST EST MOD MDM 40: CPT
